# Patient Record
Sex: FEMALE | Race: WHITE | NOT HISPANIC OR LATINO | Employment: OTHER | URBAN - METROPOLITAN AREA
[De-identification: names, ages, dates, MRNs, and addresses within clinical notes are randomized per-mention and may not be internally consistent; named-entity substitution may affect disease eponyms.]

---

## 2017-01-03 ENCOUNTER — APPOINTMENT (OUTPATIENT)
Dept: NEUROLOGY | Facility: MEDICAL CENTER | Age: 52
End: 2017-01-03
Payer: MEDICAID

## 2017-01-09 ENCOUNTER — TELEPHONE (OUTPATIENT)
Dept: RHEUMATOLOGY | Facility: PHYSICIAN GROUP | Age: 52
End: 2017-01-09

## 2017-01-09 NOTE — TELEPHONE ENCOUNTER
----- Message from Yamilka Mcknight M.D. sent at 1/9/2017  1:21 PM PST -----  Last time you spoke with her she was emotionally distraught.  Can you see if she has time to review results again?

## 2017-01-17 ENCOUNTER — APPOINTMENT (OUTPATIENT)
Dept: RHEUMATOLOGY | Facility: PHYSICIAN GROUP | Age: 52
End: 2017-01-17
Payer: MEDICAID

## 2017-03-27 ENCOUNTER — APPOINTMENT (OUTPATIENT)
Dept: RADIOLOGY | Facility: IMAGING CENTER | Age: 52
End: 2017-03-27
Attending: INTERNAL MEDICINE
Payer: MEDICAID

## 2017-03-27 ENCOUNTER — NON-PROVIDER VISIT (OUTPATIENT)
Dept: URGENT CARE | Facility: PHYSICIAN GROUP | Age: 52
End: 2017-03-27
Payer: MEDICAID

## 2017-03-27 DIAGNOSIS — E11.9 DIABETES MELLITUS WITHOUT COMPLICATION (HCC): ICD-10-CM

## 2017-03-27 DIAGNOSIS — R94.5 NONSPECIFIC ABNORMAL RESULTS OF LIVER FUNCTION STUDY: ICD-10-CM

## 2017-03-27 DIAGNOSIS — M32.9 SYSTEMIC LUPUS ERYTHEMATOSUS (HCC): ICD-10-CM

## 2017-03-27 DIAGNOSIS — A04.72 INTESTINAL INFECTION DUE TO CLOSTRIDIUM DIFFICILE: ICD-10-CM

## 2017-03-27 DIAGNOSIS — R10.13 ABDOMINAL PAIN, EPIGASTRIC: ICD-10-CM

## 2017-03-27 DIAGNOSIS — K76.0 FATTY METAMORPHOSIS OF LIVER: ICD-10-CM

## 2017-03-27 DIAGNOSIS — R11.2 NAUSEA AND VOMITING, INTRACTABILITY OF VOMITING NOT SPECIFIED, UNSPECIFIED VOMITING TYPE: ICD-10-CM

## 2017-03-27 PROCEDURE — 74000 DX-ABDOMEN-1 VIEW: CPT | Performed by: FAMILY MEDICINE

## 2017-03-30 ENCOUNTER — HOSPITAL ENCOUNTER (OUTPATIENT)
Dept: LAB | Facility: MEDICAL CENTER | Age: 52
End: 2017-03-30
Attending: INTERNAL MEDICINE
Payer: MEDICAID

## 2017-03-30 LAB
ALBUMIN SERPL BCP-MCNC: 3.7 G/DL (ref 3.2–4.9)
ALBUMIN/GLOB SERPL: 1.1 G/DL
ALP SERPL-CCNC: 156 U/L (ref 30–99)
ALT SERPL-CCNC: 43 U/L (ref 2–50)
ANION GAP SERPL CALC-SCNC: 11 MMOL/L (ref 0–11.9)
AST SERPL-CCNC: 69 U/L (ref 12–45)
BILIRUB SERPL-MCNC: 1.6 MG/DL (ref 0.1–1.5)
BUN SERPL-MCNC: 10 MG/DL (ref 8–22)
CALCIUM SERPL-MCNC: 8.9 MG/DL (ref 8.5–10.5)
CHLORIDE SERPL-SCNC: 106 MMOL/L (ref 96–112)
CO2 SERPL-SCNC: 23 MMOL/L (ref 20–33)
CREAT SERPL-MCNC: 0.67 MG/DL (ref 0.5–1.4)
GLOBULIN SER CALC-MCNC: 3.4 G/DL (ref 1.9–3.5)
GLUCOSE SERPL-MCNC: 86 MG/DL (ref 65–99)
INR PPP: 0.95 (ref 0.87–1.13)
POTASSIUM SERPL-SCNC: 3.7 MMOL/L (ref 3.6–5.5)
PROT SERPL-MCNC: 7.1 G/DL (ref 6–8.2)
PROTHROMBIN TIME: 13 SEC (ref 12–14.6)
SODIUM SERPL-SCNC: 140 MMOL/L (ref 135–145)

## 2017-03-30 PROCEDURE — 85610 PROTHROMBIN TIME: CPT

## 2017-03-30 PROCEDURE — 36415 COLL VENOUS BLD VENIPUNCTURE: CPT

## 2017-03-30 PROCEDURE — 80053 COMPREHEN METABOLIC PANEL: CPT

## 2017-03-31 ENCOUNTER — OFFICE VISIT (OUTPATIENT)
Dept: RHEUMATOLOGY | Facility: PHYSICIAN GROUP | Age: 52
End: 2017-03-31
Payer: MEDICAID

## 2017-03-31 VITALS
RESPIRATION RATE: 16 BRPM | SYSTOLIC BLOOD PRESSURE: 148 MMHG | WEIGHT: 181 LBS | DIASTOLIC BLOOD PRESSURE: 100 MMHG | BODY MASS INDEX: 32.07 KG/M2 | TEMPERATURE: 98.3 F | HEART RATE: 80 BPM | OXYGEN SATURATION: 96 %

## 2017-03-31 DIAGNOSIS — Z79.899 ENCOUNTER FOR LONG-TERM (CURRENT) USE OF HIGH-RISK MEDICATION: ICD-10-CM

## 2017-03-31 DIAGNOSIS — R79.89 ABNORMAL LFTS: ICD-10-CM

## 2017-03-31 DIAGNOSIS — M32.9 SLE (SYSTEMIC LUPUS ERYTHEMATOSUS) (HCC): ICD-10-CM

## 2017-03-31 DIAGNOSIS — M10.9 GOUT, UNSPECIFIED CAUSE, UNSPECIFIED CHRONICITY, UNSPECIFIED SITE: ICD-10-CM

## 2017-03-31 DIAGNOSIS — M1A.9XX1 CHRONIC TOPHACEOUS GOUT: ICD-10-CM

## 2017-03-31 DIAGNOSIS — M19.90 CHRONIC INFLAMMATORY ARTHRITIS: ICD-10-CM

## 2017-03-31 PROCEDURE — 99214 OFFICE O/P EST MOD 30 MIN: CPT | Performed by: INTERNAL MEDICINE

## 2017-03-31 ASSESSMENT — ENCOUNTER SYMPTOMS
BLOOD IN STOOL: 0
FEVER: 0
EYE PAIN: 0
HEMOPTYSIS: 0
NECK PAIN: 1
DOUBLE VISION: 0
CONSTIPATION: 0
CHILLS: 0
PHOTOPHOBIA: 0
BACK PAIN: 1
COUGH: 0

## 2017-03-31 NOTE — PROGRESS NOTES
Subjective:   Date of Consultation:3/31/2017  1:15 PM  Primary care physician:Jasmyne Soto M.D.    Reason for Consultation:  Ms. Leggett  is a pleasant 50 y.o. year old female who presents for follow-up of Chronic inflammatory arthritis, chronic tophaceous gout    Recent Hospitalization  At out last visit she reports that she was hospitalized in October 2016 with elevated LFT and a diagnosis of lupus.  At that time she was treated with solumedrol IV.    Since last visit she again was hospitalized for C diff and pneumonia.    During her hospitalization Humira was held.  She restarted this after her Humira February 15th.  She was discharged with prednisone 10 mg po q day.    History of recurrent pancreatitis  This most recent episode (Oct 2016) would be her 6th episode.  Episodes of pancreatitis started in 2007 when she was drinking.    Chronic inflammatory arthritis  At last visit there was some disease activity.  We will continue to keep her at  Prednisone 15 mg po q day  She restarted her Humira on February 15th after discharge and notes mild stiffness 1-1.5 hours.  She denies any swelling.    Current Medications:  Humira 40 mg po q 2 weeks  (q Wednesday) last shot was the past Wednesday  Sulfasalazine was stopped due to elevated LFT  Plaquenil 200 mg BID  She had tapered down to 10 mg po q day of prednisone  She is off methotrexate (secondary to elevated LFT) stopped in December    RHEUM HISTORY  She first presented to see Dr. Patino 9/15/2015 with right wrist and hand pain with swelling and erythema.   However she was initially diagnosed in 2004 with bursitis and 2009 with a diagnosis of lupus.  She presented to Dr. Lawler's practice and was diagnosed with seronegative Rheumatoid Arthritis.  Historically it seems she was responsive only to high dose steroids.  She was previously on Enbrel with poor response she started getting sick.  She was then placed on CImzia and had good response however insurance would not  cover the medication, thus she was started on Humira.  Her care has been complicated with sepsis and elevated LFT for which MTX was initially stopped then sulfasalazine.    She does report a history of lupus and received a diagnosis in 2009.  According to notes from 10/6/2015 her manifestations include intermittent oral ulcers, pleuritic chest pain, photosensitivity.      Hand xray 9/15/2015 shows no erosions but soft tissue swelling related to OA vs inflammatory arthritis    Elevated LFT  She was noted to have high cholesterol and elevated LFTs at her hospitalization.  She was followed with Dr. Boone and felt she had fatty liver.    Chronic Tophaceous gout   No gout flares since last visit.  Last uric acid was 4.9 on  7/22/2016  Off colchicine - this was stopped during her hospitalization   She has not had any problems or flares.    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 Soto    Osteopenia  On alendronate  No fractures since last visit    Hematuria  She reports she notes blood tinge hematuria.  She has seen urology at [facility] and her evaluation felt it was 2/2 methotrexate    DDD, cervical  Saw neurosurgery.  She was scheduled for an injection but missed the appointment.  Planned for surgery in January 2016  However due to her hospitalization had to miss her surgery again.  She feels her neck pain is worsening and plans to get in touch with surgery again.    Vitamin D deficiency  Patient state she is taking calcium and vitamin D  25 hydroxy vitamin D in 7/2016 was 29    Chronic steroid use  She was told she had adrenal insufficiency at her October hospitalization  She reports no change in her symptoms as she decreased from 15 mg po q day to 10 mg po qday      Past Medical History   Diagnosis Date   • Lupus (CMS-HCC)    • Fibroids    • SLE (systemic lupus  erythematosus) (CMS-HCC)    • Psoriatic arthritis (CMS-HCC)    • Pancreatitis    • Arthritis      Past Surgical History   Procedure Laterality Date   • Gyn surgery  8/2013     Hysteroscopy, D and C     Allergies   Allergen Reactions   • Codeine      Outpatient Encounter Prescriptions as of 3/31/2017   Medication Sig Dispense Refill   • allopurinol (ZYLOPRIM) 300 MG Tab Take 1 Tab by mouth every day. 30 Tab 0   • hydroxychloroquine (PLAQUENIL) 200 MG Tab Take 1 Tab by mouth 2 times a day. 60 Tab 1   • folic acid (FOLVITE) 1 MG Tab Take 2 tabs daily. 60 Tab 11   • predniSONE (DELTASONE) 5 MG Tab Take 15 mg daily as instructed (Patient taking differently: 3 times a day. Take 15 mg daily as instructed) 270 Tab 2   • lisinopril (PRINIVIL) 10 MG Tab Take 1 Tab by mouth every day. 30 Tab 11   • Adalimumab 40 MG/0.8ML Pen-injector Kit Inject 40 mg as instructed every 14 days. 2 Each 12   • allopurinol (ZYLOPRIM) 100 MG Tab Take 50 mg daily for 1 week then 100 mg daily for 1 week then 150 mg daily for 1 week and then 200 mg daily. 60 Tab 1   • cyanocobalamin (VITAMIN B-12) 500 MCG Tab      • famotidine (PEPCID) 20 MG Tab      • gemfibrozil (LOPID) 600 MG Tab      • predniSONE (DELTASONE) 5 MG Tab      • esomeprazole (NEXIUM) 40 MG delayed-release capsule Take 40 mg by mouth every morning before breakfast.     • ferrous sulfate 325 (65 FE) MG EC tablet Take 1 Tab by mouth with dinner. 90 Each 5   • predniSONE (DELTASONE) 20 MG TABS Take 1 Tab by mouth every day. Take 20mg for 7 days  Then 10mg for 7 days  Then 5mg for 7 days 12 Tab 0   • oxybutynin (DITROPAN) 5 MG TABS Take 5 mg by mouth 2 Times a Day.     • Insulin Lispro, Human, (HUMALOG PEN SC) Inject  as instructed.     • fenofibrate (TRICOR) 145 MG Tab      • oxycodone immediate release (ROXICODONE) 10 MG immediate release tablet      • MOVIPREP 100 G Recon Soln      • metformin (GLUCOPHAGE) 500 MG Tab      • FREESTYLE LANCETS Misc      • Glucose Blood (FREESTYLE LITE  "TEST VI)      • RELION ALCOHOL SWABS 70 % Pads      • Insulin Syringe-Needle U-100 30G X 5/16\" 0.5 ML Misc      • alendronate (FOSAMAX) 70 MG Tab Take 1 Tab by mouth every 7 days. 4 Tab 3   • ergocalciferol (DRISDOL) 67550 UNIT capsule Take 1 cap once weekly for 12 weeks. 12 Cap 0   • colchicine (COLCRYS) 0.6 MG Tab Take 1 Tab by mouth 2 times a day. 60 Tab 5   • naproxen (NAPROSYN) 500 MG Tab Take 500 mg by mouth 2 times a day, with meals.     • nystatin (MYCOSTATIN) Powder Apply  to affected area(s) 3 times a day.     • Phenol Liquid by Does not apply route.     • THIAMINE HCL PO Take  by mouth.     • NS SOLN 250 mL with vancomycin 1000 MG SOLR 2 g 2 g by Intravenous route.     • Oxycodone-Acetaminophen (PERCOCET-10)  MG Tab 1 tab tid prn 30 Tab 0   • amoxicillin-clavulanate (AUGMENTIN) 500-125 MG Tab      • chlorhexidine (PERIDEX) 0.12 % Solution      • hydrocodone-acetaminophen 2.5-108 mg/5mL (HYCET) 7.5-325 MG/15ML solution      • lisinopril-hydrochlorothiazide (PRINZIDE, ZESTORETIC) 20-25 MG per tablet      • metronidazole (FLAGYL) 500 MG Tab      • nystatin (MYCOSTATIN) 994559 UNIT/ML Suspension      • thiamine (THIAMINE) 100 MG Tab      • vancomycin 50 mg/ml (FIRST-VANCOMYCIN) 50 MG/ML Solution oral solution   0   • Ascorbic Acid (VITAMIN C-RICKY HIPS) 500 MG Tab      • ciprofloxacin (CIPRO) 500 MG TABS Take 500 mg by mouth 2 times a day.     • hydrocodone-acetaminophen (NORCO) 5-325 MG TABS per tablet Take 1 Tab by mouth every four hours as needed. 20 Tab 0   • promethazine (PHENERGAN) 25 MG TABS Take 25 mg by mouth every 6 hours as needed.     • Potassium Gluconate 595 MG CAPS Take  by mouth.       No facility-administered encounter medications on file as of 3/31/2017.     Social History     Social History   • Marital Status:      Spouse Name: N/A   • Number of Children: N/A   • Years of Education: N/A     Occupational History   • Not on file.     Social History Main Topics   • Smoking status: " Never Smoker    • Smokeless tobacco: Not on file   • Alcohol Use: Yes   • Drug Use: No   • Sexual Activity: Not on file     Other Topics Concern   • Not on file     Social History Narrative      History   Smoking status   • Never Smoker    Smokeless tobacco   • Not on file     History   Alcohol Use   • Yes     History   Drug Use No      OB History    Para Term  AB SAB TAB Ectopic Multiple Living   0 0                    No LMP recorded.    G P A L     Family History   Problem Relation Age of Onset   • Diabetes Mother    • Hypertension Mother    • Cancer Mother    • Diabetes Father    • Cancer Father        Review of Systems   Constitutional: Positive for malaise/fatigue. Negative for fever and chills.   HENT:        No oral ulcers or dry mouth   Eyes: Negative for double vision, photophobia and pain.   Respiratory: Negative for cough and hemoptysis.    Cardiovascular: Negative for chest pain.   Gastrointestinal: Negative for constipation and blood in stool.   Musculoskeletal: Positive for back pain, joint pain and neck pain.   Skin:        No malar rash or photosensitive rash        Objective:   /100 mmHg  Pulse 80  Temp(Src) 36.8 °C (98.3 °F)  Resp 16  Wt 82.101 kg (181 lb)  SpO2 96%  Breastfeeding? No    Physical Exam   Constitutional: She is oriented to person, place, and time. She appears well-developed and well-nourished. No distress.   HENT:   Head: Normocephalic.   Right Ear: External ear normal.   Left Ear: External ear normal.   Mouth/Throat: Oropharynx is clear and moist.   Eyes: Conjunctivae and EOM are normal. Pupils are equal, round, and reactive to light. Right eye exhibits no discharge. Left eye exhibits no discharge.   Neck: No JVD present.   Pulmonary/Chest: Effort normal and breath sounds normal. No stridor. No respiratory distress.   Abdominal: Soft.   Musculoskeletal:   Gout tophi on the right elbow and MCP swelling if the 2nd and 3rd MCP.     Neurological: She is alert  and oriented to person, place, and time.   Skin: Skin is warm. She is not diaphoretic. No erythema.   Psychiatric: She has a normal mood and affect. Her behavior is normal. Thought content normal.       Assessment:     1. Chronic inflammatory arthritis  IGG SERUM QUANT    COMP METABOLIC PANEL    CBC WITH DIFFERENTIAL    WESTERGREN SED RATE    CRP QUANTITIVE (NON-CARDIAC)    REFERRAL TO INTERNAL MEDICINE    CANCELED: REFERRAL TO INTERNAL MEDICINE    CANCELED: REFERRAL TO INTERNAL MEDICINE   2. Chronic tophaceous gout  IGG SERUM QUANT    COMP METABOLIC PANEL    CBC WITH DIFFERENTIAL    WESTERGREN SED RATE    CRP QUANTITIVE (NON-CARDIAC)    CANCELED: REFERRAL TO INTERNAL MEDICINE    CANCELED: REFERRAL TO INTERNAL MEDICINE   3. Abnormal LFTs  IGG SERUM QUANT    COMP METABOLIC PANEL    CBC WITH DIFFERENTIAL    WESTERGREN SED RATE    CRP QUANTITIVE (NON-CARDIAC)    REFERRAL TO INTERNAL MEDICINE    CANCELED: REFERRAL TO INTERNAL MEDICINE    CANCELED: REFERRAL TO INTERNAL MEDICINE   4. Encounter for long-term (current) use of high-risk medication  IGG SERUM QUANT    COMP METABOLIC PANEL    CBC WITH DIFFERENTIAL    WESTERGREN SED RATE    CRP QUANTITIVE (NON-CARDIAC)    CANCELED: REFERRAL TO INTERNAL MEDICINE    CANCELED: REFERRAL TO INTERNAL MEDICINE   5. Gout, unspecified cause, unspecified chronicity, unspecified site  URIC ACID, SERUM   6. SLE (systemic lupus erythematosus) (CMS-[facility])  COMPLEMENT C4    DSDNA IGG TITER    COMPLEMENT C3     Labs:    Lab Results   Component Value Date/Time    QUANTIFERON TB GOLD Negative 12/30/2015 12:27 PM     Lab Results   Component Value Date/Time    HEPATITIS B CCORE AB, TOTAL Negative 10/21/2015 08:47 AM    HEPATITIS B SURFACE ANTIGEN Negative 10/21/2015 08:47 AM     Lab Results   Component Value Date/Time    HEPATITIS C ANTIBODY Negative 10/21/2015 08:47 AM     Lab Results   Component Value Date/Time    SODIUM 140 03/30/2017 07:59 AM    POTASSIUM 3.7 03/30/2017 07:59 AM    CHLORIDE 106  03/30/2017 07:59 AM    CO2 23 03/30/2017 07:59 AM    GLUCOSE 86 03/30/2017 07:59 AM    BUN 10 03/30/2017 07:59 AM    CREATININE 0.67 03/30/2017 07:59 AM      Lab Results   Component Value Date/Time    WBC 11.3* 11/30/2016 11:25 AM    RBC 3.85* 11/30/2016 11:25 AM    HEMOGLOBIN 12.0 11/30/2016 11:25 AM    HEMATOCRIT 36.8* 11/30/2016 11:25 AM    MCV 95.6 11/30/2016 11:25 AM    MCH 31.2 11/30/2016 11:25 AM    MCHC 32.6* 11/30/2016 11:25 AM    MPV 11.2 11/30/2016 11:25 AM    NEUTROPHILS-POLYS 76.50* 11/30/2016 11:25 AM    LYMPHOCYTES 14.30* 11/30/2016 11:25 AM    MONOCYTES 7.10 11/30/2016 11:25 AM    EOSINOPHILS 0.40 11/30/2016 11:25 AM    BASOPHILS 0.50 11/30/2016 11:25 AM    HYPOCHROMIA 1+ 01/22/2014 02:20 AM      Lab Results   Component Value Date/Time    CALCIUM 8.9 03/30/2017 07:59 AM    AST(SGOT) 69* 03/30/2017 07:59 AM    ALT(SGPT) 43 03/30/2017 07:59 AM    ALKALINE PHOSPHATASE 156* 03/30/2017 07:59 AM    TOTAL BILIRUBIN 1.6* 03/30/2017 07:59 AM    ALBUMIN 3.7 03/30/2017 07:59 AM    TOTAL PROTEIN 7.1 03/30/2017 07:59 AM     Lab Results   Component Value Date/Time    URIC ACID 4.5 11/30/2016 11:25 AM    RHEUMATOID FACTOR -NEPH- <10 07/22/2016 04:41 PM    CCP ANTIBODIES 2 06/22/2015 11:24 AM    ANTINUCLEAR ANTIBODY None Detected 07/22/2016 04:41 PM     Lab Results   Component Value Date/Time    SED RATE WESTERGREN 16 11/30/2016 11:25 AM    STAT C-REACTIVE PROTEIN 0.20 11/30/2016 11:25 AM     Lab Results   Component Value Date/Time    DILUTE BLANCO VIPER VENOM ALIREZA 46.8 02/10/2016 04:27 PM    LUPUS ANTICOAGULANT Not Present 02/10/2016 04:27 PM     Lab Results   Component Value Date/Time    C3 COMPLEMENT 214.0* 11/30/2016 11:25 AM    COMPLEMENT C4 39.0 11/30/2016 11:25 AM    ANTI-CARIOLIPIN AB IGG 1 02/10/2016 04:27 PM    ANTI-CARDIOLIPIN AB IGM 0 02/10/2016 04:27 PM    ANTI-CARDIOLIPIN AB IGA 1 02/10/2016 04:27 PM     Lab Results   Component Value Date/Time    ANTI-DNA -DS None Detected 11/30/2016 11:25 AM     Lab  Results   Component Value Date/Time    ANTI-DNA -DS None Detected 11/30/2016 11:25 AM    ANCA IGG <1:20 02/10/2016 04:27 PM    C3 COMPLEMENT 214.0* 11/30/2016 11:25 AM     Lab Results   Component Value Date/Time    COLOR Straw 11/30/2016 11:26 AM    SPECIFIC GRAVITY 1.005 11/30/2016 11:26 AM    PH 6.0 11/30/2016 11:26 AM    GLUCOSE Negative 11/30/2016 11:26 AM    KETONES Negative 11/30/2016 11:26 AM    PROTEIN Negative 11/30/2016 11:26 AM     No results found for: TOTPROTUR, TOTALVOLUME, [id number]  No results found for: SSA60, SSA52  Lab Results   Component Value Date/Time    GLYCOHEMOGLOBIN 6.5* 09/21/2015 09:34 AM     Lab Results   Component Value Date/Time    CPK TOTAL 130 07/22/2016 04:41 PM     Lab Results   Component Value Date/Time    G-6-PD 11.0 12/14/2015 12:40 PM     No results found for: [id number]  No results found for: ACESERUM  Lab Results   Component Value Date/Time    25-HYDROXY   VITAMIN D 25 29* 07/22/2016 04:41 PM     No results found for: TSH, FREEDIR  Lab Results   Component Value Date/Time    TSH 1.310 07/22/2016 04:41 PM    FREE T-4 0.99 09/21/2015 09:34 AM     No results found for: MICROSOMALA, ANTITHYROGL  No results found for: IGGLYMEABS  No results found for: ANTIMITOCHO, FACTIN  No results found for: IGA, TTRANSIGA, ENDOIGA  Lab Results   Component Value Date/Time    FLUID TYPE comment 09/15/2015 02:59 PM    CRYSTALS, BODY FLUID Many 09/15/2015 02:59 PM     No results found for: ISTATICAL  No results found for: ISTATCREAT  No results found for: CTELOPEP  No results found for: GBMABG  No results found for: PTHINTACT        Medical Decision Making:  Today's Assessment / Status / Plan:     Chronic inflammatory arthritis  She is currently on prednisone 10 mg and Humira.  She restarted Humira about 6 weeks ago.  She does have prolonged morning stiffness. We will continue her regimen however I would like to restart sulfasalazine.      There is concenr for etiology of LFT.  She reports she will  may have a liver biopsy.  We will await that assessment.  Her recent LFT is slightly elevated alkaline phosphatase and AST>    I would like to restart her sulfasalazine.      History of Lupus  No active signs or symptoms.  Will recheck complement and anti dsDNA and evaluate for cytopenias.  Continue plaquenil 200 mg BID      Recurrent pancreatitis  Etiology unclear.  She does not demonstrate or report symptoms of rash, serositis related to lupus.  We will request records from [facility] for labs, progress notes.  I am not sure if she had a  Lupus flare during her most recent hospitalization.  She has hypertriglyceridemia which could be an explanation for pancreatitis.        Elevated LFT and hepatomegaly on exam  Follows GI      History of Hematuria  Etiology was attributed to methotrexate use.  Recheck still shows it is elevated.    Osteopenia and vitamin D deficiency  Continue fosamax   Continue vitamin D and calcium supplement    Chronic tophaceous gout  Continue allopurinol   Will recheck uric acid    1. Chronic inflammatory arthritis  IGG SERUM QUANT    COMP METABOLIC PANEL    CBC WITH DIFFERENTIAL    WESTERGREN SED RATE    CRP QUANTITIVE (NON-CARDIAC)    REFERRAL TO INTERNAL MEDICINE    CANCELED: REFERRAL TO INTERNAL MEDICINE    CANCELED: REFERRAL TO INTERNAL MEDICINE   2. Chronic tophaceous gout  IGG SERUM QUANT    COMP METABOLIC PANEL    CBC WITH DIFFERENTIAL    WESTERGREN SED RATE    CRP QUANTITIVE (NON-CARDIAC)    CANCELED: REFERRAL TO INTERNAL MEDICINE    CANCELED: REFERRAL TO INTERNAL MEDICINE   3. Abnormal LFTs  IGG SERUM QUANT    COMP METABOLIC PANEL    CBC WITH DIFFERENTIAL    WESTERGREN SED RATE    CRP QUANTITIVE (NON-CARDIAC)    REFERRAL TO INTERNAL MEDICINE    CANCELED: REFERRAL TO INTERNAL MEDICINE    CANCELED: REFERRAL TO INTERNAL MEDICINE   4. Encounter for long-term (current) use of high-risk medication  IGG SERUM QUANT    COMP METABOLIC PANEL    CBC WITH DIFFERENTIAL    WESTERGREN SED  RATE    CRP QUANTITIVE (NON-CARDIAC)    CANCELED: REFERRAL TO INTERNAL MEDICINE    CANCELED: REFERRAL TO INTERNAL MEDICINE   5. Gout, unspecified cause, unspecified chronicity, unspecified site  URIC ACID, SERUM   6. SLE (systemic lupus erythematosus) (CMS-[facility])  COMPLEMENT C4    DSDNA IGG TITER    COMPLEMENT C3           Return in about 3 months (around 6/30/2017).    She agreed and verbalized her agreement and understanding with the current plan. I answered all questions and concerns she has at this time and advised her to call at any time in there interim with questions or concerns in regards to her care.    Thank you for allowing me to participate in her care, I will continue to follow closely.

## 2017-03-31 NOTE — MR AVS SNAPSHOT
Brandijaime Leggett   3/31/2017 1:30 PM   Office Visit   MRN: [id number]    Department:  Rheumatology [facility]   Dept Phone:  [phone]    Description:  Female : 1965   Provider:  Yamilka Mcknight M.D.           Reason for Visit     Follow-Up follow up      Allergies as of 3/31/2017     Allergen Noted Reactions    Codeine 2014         You were diagnosed with     Chronic inflammatory arthritis   [821106]       Chronic tophaceous gout   [482975]       Abnormal LFTs   [701497]       Encounter for long-term (current) use of high-risk medication   [951778]       Gout, unspecified cause, unspecified chronicity, unspecified site   [1429325]         Vital Signs     Blood Pressure Pulse Temperature Respirations Weight Oxygen Saturation    148/100 mmHg 80 36.8 °C (98.3 °F) 16 82.101 kg (181 lb) 96%    Breastfeeding? Smoking Status                No Never Smoker           Basic Information     Date Of Birth Sex Race Ethnicity Preferred Language    1965 Female White Non- English      Your appointments     2017  1:30 PM   Follow Up Visit with Yamilka Mcknight M.D.   [facility] (--)    [address] [place] [address], [facility]  [facility] [id number] [phone]           You will be receiving a confirmation call a few days before your appointment from our automated call confirmation system.              Problem List              ICD-10-CM Priority Class Noted - Resolved    Vertigo R42   2014 - Present    Left arm swelling M79.89   2014 - Present    SLE (systemic lupus erythematosus) (CMS-HCC) M32.9   Unknown - Present    Psoriatic arthritis (CMS-HCC) L40.50   Unknown - Present    Pancreatitis K85.90   Unknown - Present    Right hand pain M79.641   9/15/2015 - Present    Hyperuricemia E79.0   9/15/2015 - Present    Polyarthralgia M25.50   9/15/2015 - Present    Chronic inflammatory arthritis M19.90   9/15/2015 - Present    Chronic tophaceous gout M1A.9XX1   2015 - Present    Drug-induced chronic pancreatitis (CMS-HCC) K86.1   10/6/2015 - Present    Vitamin D deficiency E55.9   10/6/2015 - Present    Osteopenia M85.80   10/26/2015 - Present    Dysuria R30.0   12/14/2015 - Present    Abnormal LFTs R79.89   1/14/2016 - Present    Abdominal pain in female patient R10.9   1/14/2016 - Present    Xanthoma E75.5   2/10/2016 - Present    Hypertriglyceridemia E78.1   2/10/2016 - Present    Traumatic periorbital ecchymosis S00.10XA   4/4/2016 - Present    Chronic right shoulder pain M25.511, G89.29   6/2/2016 - Present    Essential hypertension I10   6/2/2016 - Present    Skin lesions L98.9   6/2/2016 - Present    Chronic fatigue R53.82   7/14/2016 - Present    Hyperlipidemia E78.5   7/14/2016 - Present    DDD (degenerative disc disease), cervical M50.30   7/14/2016 - Present      Health Maintenance        Date Due Completion Dates    IMM HEP B VACCINE (1 of 3 - Primary Series) 1965 ---    DIABETES MONOFILAMENT / LE EXAM 6/14/1968 ---    RETINAL SCREENING 12/14/1985 ---    URINE ACR / MICROALBUMIN 12/14/1985 ---    IMM DTaP/Tdap/Td Vaccine (1 - Tdap) 12/14/1986 ---    PAP SMEAR 12/14/1988 ---    MAMMOGRAM 8/5/2009 8/5/2008, 8/5/2008    A1C SCREENING 3/21/2016 9/21/2015    IMM INFLUENZA (1) 9/1/2016 1/19/2014    FASTING LIPID PROFILE 9/21/2016 9/21/2015, 6/22/2015    SERUM CREATININE 3/30/2018 3/30/2017, 11/30/2016, 7/22/2016, 2/10/2016, 12/30/2015, 12/14/2015, 10/21/2015, 9/21/2015, 9/16/2015, 6/22/2015, 2/24/2015, 11/5/2014, 11/4/2014, 6/2/2014, 1/22/2014, 1/21/2014, 1/20/2014, 1/19/2014            Current Immunizations     Influenza TIV (IM) 1/19/2014 11:47 AM    Pneumococcal polysaccharide vaccine (PPSV-23) 12/15/2015 11:45 AM, 1/9/2014      Below and/or attached are the medications your provider expects you to take. Review all of your home medications and newly ordered medications with your provider and/or pharmacist. Follow medication instructions as directed by your provider and/or  pharmacist. Please keep your medication list with you and share with your provider. Update the information when medications are discontinued, doses are changed, or new medications (including over-the-counter products) are added; and carry medication information at all times in the event of emergency situations     Allergies:  CODEINE - (reactions not documented)               Medications  Valid as of: March 31, 2017 -  1:51 PM    Generic Name Brand Name Tablet Size Instructions for use    Adalimumab (Pen-injector Kit) Adalimumab 40 MG/0.8ML Inject 40 mg as instructed every 14 days.        Alcohol Swabs (Pads) RELION ALCOHOL SWABS 70 %         Alendronate Sodium (Tab) FOSAMAX 70 MG Take 1 Tab by mouth every 7 days.        Allopurinol (Tab) ZYLOPRIM 100 MG Take 50 mg daily for 1 week then 100 mg daily for 1 week then 150 mg daily for 1 week and then 200 mg daily.        Allopurinol (Tab) ZYLOPRIM 300 MG Take 1 Tab by mouth every day.        Amoxicillin-Pot Clavulanate (Tab) AUGMENTIN 500-125 MG         Ascorbic Acid (Tab) Vitamin C-Prerna Hips 500 MG         Chlorhexidine Gluconate (Solution) PERIDEX 0.12 %         Ciprofloxacin HCl (Tab) CIPRO 500 MG Take 500 mg by mouth 2 times a day.        Colchicine (Tab) COLCRYS 0.6 MG Take 1 Tab by mouth 2 times a day.        Cyanocobalamin (Tab) VITAMIN B-12 500 MCG         Ergocalciferol (Cap) DRISDOL 60040 UNIT Take 1 cap once weekly for 12 weeks.        Esomeprazole Magnesium (CAPSULE DELAYED RELEASE) NEXIUM 40 MG Take 40 mg by mouth every morning before breakfast.        Famotidine (Tab) PEPCID 20 MG         Fenofibrate (Tab) TRICOR 145 MG         Ferrous Sulfate (Tablet Delayed Response) ferrous sulfate 325 (65 FE) MG Take 1 Tab by mouth with dinner.        Folic Acid (Tab) FOLVITE 1 MG Take 2 tabs daily.        Gemfibrozil (Tab) LOPID 600 MG         Glucose Blood           Hydrocodone-Acetaminophen (Tab) NORCO 5-325 MG Take 1 Tab by mouth every four hours as needed.         "Hydrocodone-Acetaminophen (Solution) HYCET 7.5-325 MG/15ML         Hydroxychloroquine Sulfate (Tab) PLAQUENIL 200 MG Take 1 Tab by mouth 2 times a day.        Insulin Lispro   Inject  as instructed.        Insulin Syringe-Needle U-100 (Misc) Insulin Syringe-Needle U-100 30G X 5/16\" 0.5 ML         Lancets (Misc) FREESTYLE LANCETS          Lisinopril (Tab) PRINIVIL 10 MG Take 1 Tab by mouth every day.        Lisinopril-Hydrochlorothiazide (Tab) PRINZIDE, ZESTORETIC 20-25 MG         MetFORMIN HCl (Tab) GLUCOPHAGE 500 MG         MetroNIDAZOLE (Tab) FLAGYL 500 MG         Naproxen (Tab) NAPROSYN 500 MG Take 500 mg by mouth 2 times a day, with meals.        NS SOLN 250 mL with vancomycin 1000 MG SOLR 2 g   2 g by Intravenous route.        Nystatin (Powder) MYCOSTATIN  Apply  to affected area(s) 3 times a day.        Nystatin (Suspension) MYCOSTATIN 294516 UNIT/ML         Oxybutynin Chloride (Tab) DITROPAN 5 MG Take 5 mg by mouth 2 Times a Day.        OxyCODONE HCl (Tab) ROXICODONE 10 MG         Oxycodone-Acetaminophen (Tab) PERCOCET-10  MG 1 tab tid prn        PEG-KCl-NaCl-NaSulf-Na Asc-C (Recon Soln) MOVIPREP 100 G         Phenol (Liquid) Phenol  by Does not apply route.        Potassium Gluconate (Cap) Potassium Gluconate 595 MG Take  by mouth.        PredniSONE (Tab) DELTASONE 20 MG Take 1 Tab by mouth every day. Take 20mg for 7 days  Then 10mg for 7 days  Then 5mg for 7 days        PredniSONE (Tab) DELTASONE 5 MG         PredniSONE (Tab) DELTASONE 5 MG Take 15 mg daily as instructed        Promethazine HCl (Tab) PHENERGAN 25 MG Take 25 mg by mouth every 6 hours as needed.        Thiamine HCl   Take  by mouth.        Thiamine HCl (Tab) THIAMINE 100 MG         Vancomycin HCl (Solution) FIRST-VANCOMYCIN 50 MG/ML         .                 Medicines prescribed today were sent to:     [facility] - [place], [place] - [address] [facility] [address]    Phone: [phone] " Fax: [phone]    Open 24 Hours?: No      Medication refill instructions:       If your prescription bottle indicates you have medication refills left, it is not necessary to call your provider’s office. Please contact your pharmacy and they will refill your medication.    If your prescription bottle indicates you do not have any refills left, you may request refills at any time through one of the following ways: The online [employer] system (except Urgent Care), by calling your provider’s office, or by asking your pharmacy to contact your provider’s office with a refill request. Medication refills are processed only during regular business hours and may not be available until the next business day. Your provider may request additional information or to have a follow-up visit with you prior to refilling your medication.   *Please Note: Medication refills are assigned a new Rx number when refilled electronically. Your pharmacy may indicate that no refills were authorized even though a new prescription for the same medication is available at the pharmacy. Please request the medicine by name with the pharmacy before contacting your provider for a refill.        Your To Do List     Future Labs/Procedures Complete By Expires    IGG SERUM QUANT  As directed 3/31/2018    Standing Orders Interval Expires    CBC WITH DIFFERENTIAL  6 month until 3/31/2018 3/31/2018    COMP METABOLIC PANEL  6 month until 3/31/2018 3/31/2018    CRP QUANTITIVE (NON-CARDIAC)  6 month until 3/31/2018 3/31/2018    WESTERGREN SED RATE  6 month until 3/31/2018 3/31/2018      Referral     A referral request has been sent to our patient care coordination department. Please allow 3-5 business days for us to process this request and contact you either by phone or mail. If you do not hear from us by the 5th business day, please call us at [phone].           [employer] Service Access Code: [email or web address]  Expires: 4/26/2017  2:06 PM    Vacation Listing Service  NATALIIA secure,  online tool to manage your health information     [employer]s e Health Access® is a secure, online tool that connects you to your personalized health information from the privacy of your home -- day or night - making it very easy for you to manage your healthcare. Once the activation process is completed, you can even access your medical information using the e Health Access aniket, which is available for free in the Apple Aniket store or Google Play store.     e Health Access provides the following levels of access (as shown below):   My Chart Features   [facility] Doctor [facility]  Specialists [facility]  Urgent  Care Non-[facility]  Primary Care  Doctor   Email your healthcare team securely and privately 24/7 X X X    Manage appointments: schedule your next appointment; view details of past/upcoming appointments X      Request prescription refills. X      View recent personal medical records, including lab and immunizations X X X X   View health record, including health history, allergies, medications X X X X   Read reports about your outpatient visits, procedures, consult and ER notes X X X X   See your discharge summary, which is a recap of your hospital and/or ER visit that includes your diagnosis, lab results, and care plan. X X       How to register for e Health Access:  1. Go to  [email or web address].  2. Click on the Sign Up Now box, which takes you to the New Member Sign Up page. You will need to provide the following information:  a. Enter your e Health Access Access Code exactly as it appears at the top of this page. (You will not need to use this code after you’ve completed the sign-up process. If you do not sign up before the expiration date, you must request a new code.)   b. Enter your date of birth.   c. Enter your home email address.   d. Click Submit, and follow the next screen’s instructions.  3. Create a e Health Access ID. This will be your e Health Access login ID and cannot be changed, so think of one that is secure and easy to  remember.  4. Create a [employer] password. You can change your password at any time.  5. Enter your Password Reset Question and Answer. This can be used at a later time if you forget your password.   6. Enter your e-mail address. This allows you to receive e-mail notifications when new information is available in [employer].  7. Click Sign Up. You can now view your health information.    For assistance activating your [employer] account, call [phone]

## 2017-04-04 ENCOUNTER — TELEPHONE (OUTPATIENT)
Dept: RHEUMATOLOGY | Facility: PHYSICIAN GROUP | Age: 52
End: 2017-04-04

## 2017-04-04 NOTE — TELEPHONE ENCOUNTER
Daniela from [facility] called today asking for clarification on this patients referral to them. The patient is already an established patient there, but they will not see her for pain management if that is what you are referring her for. She gave me # [phone] to reach her back at. Thank you! Please advise.

## 2017-04-04 NOTE — TELEPHONE ENCOUNTER
Called roopa back and let her know that she was referred for a PCP, she stated that the patient is already and established patient, and has been referred to pain management. Patient has Dr. Sidhu as a PCP. And was seen last in January. She was not terminated from this facility. Roopa stated she will contact the patient and schedule her an apt. Thank you!

## 2017-04-04 NOTE — TELEPHONE ENCOUNTER
I placed a referral for primary care. She does not have a primary care and would like to establish care

## 2017-04-19 ENCOUNTER — TELEPHONE (OUTPATIENT)
Dept: RHEUMATOLOGY | Facility: PHYSICIAN GROUP | Age: 52
End: 2017-04-19

## 2017-04-19 NOTE — TELEPHONE ENCOUNTER
Called patient to discuss internal medicine referral.  She called the number we provided and she states Dr. Haskins is no longer working there.    She is having upcoming surgery and advised her to hold her Humira.  Patient states she has already been instructed by surgeon.    -------------    Please find out from our referral service where else we can refer for internal medicine.

## 2017-05-10 ENCOUNTER — HOSPITAL ENCOUNTER (OUTPATIENT)
Dept: LAB | Facility: MEDICAL CENTER | Age: 52
End: 2017-05-10
Attending: INTERNAL MEDICINE
Payer: MEDICAID

## 2017-05-10 LAB
ANISOCYTOSIS BLD QL SMEAR: ABNORMAL
BASOPHILS # BLD AUTO: 1.2 % (ref 0–1.8)
BASOPHILS # BLD: 0.1 K/UL (ref 0–0.12)
COMMENT 1642: NORMAL
DACRYOCYTES BLD QL SMEAR: NORMAL
EOSINOPHIL # BLD AUTO: 0.03 K/UL (ref 0–0.51)
EOSINOPHIL NFR BLD: 0.3 % (ref 0–6.9)
ERYTHROCYTE [DISTWIDTH] IN BLOOD BY AUTOMATED COUNT: 54.1 FL (ref 35.9–50)
GIANT PLATELETS BLD QL SMEAR: NORMAL
HCT VFR BLD AUTO: 32.2 % (ref 37–47)
HGB BLD-MCNC: 9.6 G/DL (ref 12–16)
IMM GRANULOCYTES # BLD AUTO: 0.07 K/UL (ref 0–0.11)
IMM GRANULOCYTES NFR BLD AUTO: 0.8 % (ref 0–0.9)
INR PPP: 0.93 (ref 0.87–1.13)
LYMPHOCYTES # BLD AUTO: 1.07 K/UL (ref 1–4.8)
LYMPHOCYTES NFR BLD: 12.5 % (ref 22–41)
MCH RBC QN AUTO: 29.6 PG (ref 27–33)
MCHC RBC AUTO-ENTMCNC: 29.8 G/DL (ref 33.6–35)
MCV RBC AUTO: 99.4 FL (ref 81.4–97.8)
MICROCYTES BLD QL SMEAR: ABNORMAL
MONOCYTES # BLD AUTO: 0.38 K/UL (ref 0–0.85)
MONOCYTES NFR BLD AUTO: 4.4 % (ref 0–13.4)
MORPHOLOGY BLD-IMP: NORMAL
NEUTROPHILS # BLD AUTO: 6.93 K/UL (ref 2–7.15)
NEUTROPHILS NFR BLD: 80.8 % (ref 44–72)
NRBC # BLD AUTO: 0 K/UL
NRBC BLD AUTO-RTO: 0 /100 WBC
PLATELET # BLD AUTO: 243 K/UL (ref 164–446)
PLATELET BLD QL SMEAR: NORMAL
PMV BLD AUTO: 10.6 FL (ref 9–12.9)
PROTHROMBIN TIME: 12.8 SEC (ref 12–14.6)
RBC # BLD AUTO: 3.24 M/UL (ref 4.2–5.4)
RBC BLD AUTO: PRESENT
WBC # BLD AUTO: 8.6 K/UL (ref 4.8–10.8)

## 2017-05-10 PROCEDURE — 85025 COMPLETE CBC W/AUTO DIFF WBC: CPT

## 2017-05-10 PROCEDURE — 36415 COLL VENOUS BLD VENIPUNCTURE: CPT

## 2017-05-10 PROCEDURE — 85610 PROTHROMBIN TIME: CPT

## 2017-05-25 DIAGNOSIS — M19.90 CHRONIC INFLAMMATORY ARTHRITIS: ICD-10-CM

## 2017-05-25 NOTE — TELEPHONE ENCOUNTER
Was the patient seen in the last year in this department? Yes  last office visit 3/21/17, last labs done 5/10/17    Does patient have an active prescription for medications requested? No     Received Request Via: Pharmacy

## 2017-05-26 ENCOUNTER — TELEPHONE (OUTPATIENT)
Dept: RHEUMATOLOGY | Facility: PHYSICIAN GROUP | Age: 52
End: 2017-05-26

## 2017-05-27 ENCOUNTER — HOSPITAL ENCOUNTER (OUTPATIENT)
Dept: RADIOLOGY | Facility: MEDICAL CENTER | Age: 52
End: 2017-05-27

## 2017-05-27 ENCOUNTER — APPOINTMENT (OUTPATIENT)
Dept: RADIOLOGY | Facility: MEDICAL CENTER | Age: 52
DRG: 871 | End: 2017-05-27
Attending: HOSPITALIST
Payer: MEDICAID

## 2017-05-27 ENCOUNTER — APPOINTMENT (OUTPATIENT)
Dept: RADIOLOGY | Facility: MEDICAL CENTER | Age: 52
DRG: 871 | End: 2017-05-27
Attending: EMERGENCY MEDICINE
Payer: MEDICAID

## 2017-05-27 ENCOUNTER — HOSPITAL ENCOUNTER (INPATIENT)
Facility: MEDICAL CENTER | Age: 52
LOS: 9 days | DRG: 871 | End: 2017-06-05
Attending: EMERGENCY MEDICINE | Admitting: INTERNAL MEDICINE
Payer: MEDICAID

## 2017-05-27 ENCOUNTER — RESOLUTE PROFESSIONAL BILLING HOSPITAL PROF FEE (OUTPATIENT)
Dept: OTHER | Facility: MEDICAL CENTER | Age: 52
End: 2017-05-27
Payer: MEDICAID

## 2017-05-27 DIAGNOSIS — R10.11 RIGHT UPPER QUADRANT ABDOMINAL PAIN: ICD-10-CM

## 2017-05-27 DIAGNOSIS — E87.1 HYPONATREMIA: ICD-10-CM

## 2017-05-27 DIAGNOSIS — R19.7 DIARRHEA, UNSPECIFIED TYPE: ICD-10-CM

## 2017-05-27 DIAGNOSIS — I95.9 HYPOTENSION, UNSPECIFIED HYPOTENSION TYPE: ICD-10-CM

## 2017-05-27 DIAGNOSIS — E87.5 HYPERKALEMIA: ICD-10-CM

## 2017-05-27 DIAGNOSIS — R11.2 NON-INTRACTABLE VOMITING WITH NAUSEA, UNSPECIFIED VOMITING TYPE: ICD-10-CM

## 2017-05-27 PROBLEM — A41.9 SEPTIC SHOCK(785.52): Status: ACTIVE | Noted: 2017-05-27

## 2017-05-27 PROBLEM — R65.21 SEPTIC SHOCK(785.52): Status: ACTIVE | Noted: 2017-05-27

## 2017-05-27 LAB
ALBUMIN SERPL BCP-MCNC: 2.8 G/DL (ref 3.2–4.9)
ALBUMIN SERPL BCP-MCNC: 3.1 G/DL (ref 3.2–4.9)
ALBUMIN/GLOB SERPL: 1.2 G/DL
ALBUMIN/GLOB SERPL: 1.2 G/DL
ALP SERPL-CCNC: 184 U/L (ref 30–99)
ALP SERPL-CCNC: 194 U/L (ref 30–99)
ALT SERPL-CCNC: 42 U/L (ref 2–50)
ALT SERPL-CCNC: 47 U/L (ref 2–50)
AMPHET UR QL SCN: NEGATIVE
ANION GAP SERPL CALC-SCNC: 14 MMOL/L (ref 0–11.9)
ANION GAP SERPL CALC-SCNC: 18 MMOL/L (ref 0–11.9)
ANION GAP SERPL CALC-SCNC: 25 MMOL/L (ref 0–11.9)
APPEARANCE UR: CLEAR
APTT PPP: 30.3 SEC (ref 24.7–36)
AST SERPL-CCNC: 53 U/L (ref 12–45)
AST SERPL-CCNC: 68 U/L (ref 12–45)
BACTERIA #/AREA URNS HPF: ABNORMAL /HPF
BARBITURATES UR QL SCN: NEGATIVE
BASE EXCESS BLDA CALC-SCNC: -9 MMOL/L (ref -4–3)
BASOPHILS # BLD AUTO: 0.5 % (ref 0–1.8)
BASOPHILS # BLD AUTO: 0.5 % (ref 0–1.8)
BASOPHILS # BLD: 0.06 K/UL (ref 0–0.12)
BASOPHILS # BLD: 0.09 K/UL (ref 0–0.12)
BENZODIAZ UR QL SCN: NEGATIVE
BILIRUB SERPL-MCNC: 0.7 MG/DL (ref 0.1–1.5)
BILIRUB SERPL-MCNC: 0.7 MG/DL (ref 0.1–1.5)
BILIRUB UR QL STRIP.AUTO: NEGATIVE
BLOOD CULTURE HOLD CXBCH: NORMAL
BLOOD CULTURE HOLD CXBCH: NORMAL
BODY TEMPERATURE: 36.7 CENTIGRADE
BUN SERPL-MCNC: 79 MG/DL (ref 8–22)
BUN SERPL-MCNC: 79 MG/DL (ref 8–22)
BUN SERPL-MCNC: 95 MG/DL (ref 8–22)
BZE UR QL SCN: NEGATIVE
C DIFF DNA SPEC QL NAA+PROBE: NEGATIVE
C DIFF TOX A+B STL QL IA: NEGATIVE
C DIFF TOX GENS STL QL NAA+PROBE: ABNORMAL
CALCIUM SERPL-MCNC: 6.6 MG/DL (ref 8.5–10.5)
CALCIUM SERPL-MCNC: 7.3 MG/DL (ref 8.5–10.5)
CALCIUM SERPL-MCNC: 7.4 MG/DL (ref 8.5–10.5)
CANNABINOIDS UR QL SCN: NEGATIVE
CHLORIDE SERPL-SCNC: 95 MMOL/L (ref 96–112)
CHLORIDE SERPL-SCNC: 96 MMOL/L (ref 96–112)
CHLORIDE SERPL-SCNC: 96 MMOL/L (ref 96–112)
CK SERPL-CCNC: 145 U/L (ref 0–154)
CO2 SERPL-SCNC: 17 MMOL/L (ref 20–33)
CO2 SERPL-SCNC: 22 MMOL/L (ref 20–33)
CO2 SERPL-SCNC: 9 MMOL/L (ref 20–33)
COLOR UR: ABNORMAL
CORTIS SERPL-MCNC: 10.1 UG/DL (ref 0–23)
CREAT SERPL-MCNC: 4.64 MG/DL (ref 0.5–1.4)
CREAT SERPL-MCNC: 4.96 MG/DL (ref 0.5–1.4)
CREAT SERPL-MCNC: 5.92 MG/DL (ref 0.5–1.4)
CREAT UR-MCNC: 31 MG/DL
EKG IMPRESSION: NORMAL
EOSINOPHIL # BLD AUTO: 0 K/UL (ref 0–0.51)
EOSINOPHIL # BLD AUTO: 0.07 K/UL (ref 0–0.51)
EOSINOPHIL NFR BLD: 0 % (ref 0–6.9)
EOSINOPHIL NFR BLD: 0.6 % (ref 0–6.9)
EPI CELLS #/AREA URNS HPF: ABNORMAL /HPF
ERYTHROCYTE [DISTWIDTH] IN BLOOD BY AUTOMATED COUNT: 42.3 FL (ref 35.9–50)
ERYTHROCYTE [DISTWIDTH] IN BLOOD BY AUTOMATED COUNT: 43.3 FL (ref 35.9–50)
GFR SERPL CREATININE-BSD FRML MDRD: 10 ML/MIN/1.73 M 2
GFR SERPL CREATININE-BSD FRML MDRD: 8 ML/MIN/1.73 M 2
GFR SERPL CREATININE-BSD FRML MDRD: 9 ML/MIN/1.73 M 2
GLOBULIN SER CALC-MCNC: 2.4 G/DL (ref 1.9–3.5)
GLOBULIN SER CALC-MCNC: 2.6 G/DL (ref 1.9–3.5)
GLUCOSE SERPL-MCNC: 114 MG/DL (ref 65–99)
GLUCOSE SERPL-MCNC: 132 MG/DL (ref 65–99)
GLUCOSE SERPL-MCNC: 91 MG/DL (ref 65–99)
GLUCOSE UR STRIP.AUTO-MCNC: ABNORMAL MG/DL
HCO3 BLDA-SCNC: 15 MMOL/L (ref 17–25)
HCT VFR BLD AUTO: 30.4 % (ref 37–47)
HCT VFR BLD AUTO: 34 % (ref 37–47)
HEMOCCULT SP1 STL QL: POSITIVE
HGB BLD-MCNC: 10.2 G/DL (ref 12–16)
HGB BLD-MCNC: 11.2 G/DL (ref 12–16)
HYALINE CASTS #/AREA URNS LPF: ABNORMAL /LPF
IMM GRANULOCYTES # BLD AUTO: 0.03 K/UL (ref 0–0.11)
IMM GRANULOCYTES # BLD AUTO: 0.05 K/UL (ref 0–0.11)
IMM GRANULOCYTES NFR BLD AUTO: 0.3 % (ref 0–0.9)
IMM GRANULOCYTES NFR BLD AUTO: 0.3 % (ref 0–0.9)
INHALED O2 FLOW RATE: 2 L/MIN (ref 2–10)
INR PPP: 1.08 (ref 0.87–1.13)
KETONES UR STRIP.AUTO-MCNC: NEGATIVE MG/DL
LACTATE BLD-SCNC: 1.3 MMOL/L (ref 0.5–2)
LACTATE BLD-SCNC: 1.9 MMOL/L (ref 0.5–2)
LACTATE BLD-SCNC: 5.9 MMOL/L (ref 0.5–2)
LEUKOCYTE ESTERASE UR QL STRIP.AUTO: NEGATIVE
LIPASE SERPL-CCNC: 49 U/L (ref 11–82)
LYMPHOCYTES # BLD AUTO: 1.62 K/UL (ref 1–4.8)
LYMPHOCYTES # BLD AUTO: 1.84 K/UL (ref 1–4.8)
LYMPHOCYTES NFR BLD: 16.2 % (ref 22–41)
LYMPHOCYTES NFR BLD: 9.6 % (ref 22–41)
MCH RBC QN AUTO: 29.3 PG (ref 27–33)
MCH RBC QN AUTO: 29.6 PG (ref 27–33)
MCHC RBC AUTO-ENTMCNC: 32.9 G/DL (ref 33.6–35)
MCHC RBC AUTO-ENTMCNC: 33.6 G/DL (ref 33.6–35)
MCV RBC AUTO: 88.1 FL (ref 81.4–97.8)
MCV RBC AUTO: 89 FL (ref 81.4–97.8)
MDMA UR QL SCN: NEGATIVE
METHADONE UR QL SCN: NEGATIVE
MICRO URNS: ABNORMAL
MONOCYTES # BLD AUTO: 0.76 K/UL (ref 0–0.85)
MONOCYTES # BLD AUTO: 0.81 K/UL (ref 0–0.85)
MONOCYTES NFR BLD AUTO: 4.5 % (ref 0–13.4)
MONOCYTES NFR BLD AUTO: 7.1 % (ref 0–13.4)
NEUTROPHILS # BLD AUTO: 14.42 K/UL (ref 2–7.15)
NEUTROPHILS # BLD AUTO: 8.56 K/UL (ref 2–7.15)
NEUTROPHILS NFR BLD: 75.3 % (ref 44–72)
NEUTROPHILS NFR BLD: 85.1 % (ref 44–72)
NITRITE UR QL STRIP.AUTO: NEGATIVE
NRBC # BLD AUTO: 0 K/UL
NRBC # BLD AUTO: 0 K/UL
NRBC BLD AUTO-RTO: 0 /100 WBC
NRBC BLD AUTO-RTO: 0 /100 WBC
OPIATES UR QL SCN: POSITIVE
OXYCODONE UR QL SCN: NEGATIVE
PCO2 BLDA: 26.2 MMHG (ref 26–37)
PCO2 TEMP ADJ BLDA: 25.9 MMHG (ref 26–37)
PCP UR QL SCN: NEGATIVE
PH BLDA: 7.36 [PH] (ref 7.4–7.5)
PH TEMP ADJ BLDA: 7.37 [PH] (ref 7.4–7.5)
PH UR STRIP.AUTO: 5 [PH]
PHOSPHATE SERPL-MCNC: 6.5 MG/DL (ref 2.5–4.5)
PLATELET # BLD AUTO: 175 K/UL (ref 164–446)
PLATELET # BLD AUTO: 309 K/UL (ref 164–446)
PMV BLD AUTO: 10.2 FL (ref 9–12.9)
PMV BLD AUTO: 10.3 FL (ref 9–12.9)
PO2 BLDA: 105.7 MMHG (ref 64–87)
PO2 TEMP ADJ BLDA: 103.9 MMHG (ref 64–87)
POTASSIUM SERPL-SCNC: 3.8 MMOL/L (ref 3.6–5.5)
POTASSIUM SERPL-SCNC: 3.9 MMOL/L (ref 3.6–5.5)
POTASSIUM SERPL-SCNC: 4.9 MMOL/L (ref 3.6–5.5)
PROPOXYPH UR QL SCN: NEGATIVE
PROT SERPL-MCNC: 5.2 G/DL (ref 6–8.2)
PROT SERPL-MCNC: 5.7 G/DL (ref 6–8.2)
PROT UR QL STRIP: NEGATIVE MG/DL
PROTHROMBIN TIME: 14.3 SEC (ref 12–14.6)
RBC # BLD AUTO: 3.45 M/UL (ref 4.2–5.4)
RBC # BLD AUTO: 3.82 M/UL (ref 4.2–5.4)
RBC # URNS HPF: ABNORMAL /HPF
RBC UR QL AUTO: ABNORMAL
SAO2 % BLDA: 96.5 % (ref 93–99)
SODIUM SERPL-SCNC: 129 MMOL/L (ref 135–145)
SODIUM SERPL-SCNC: 131 MMOL/L (ref 135–145)
SODIUM SERPL-SCNC: 132 MMOL/L (ref 135–145)
SODIUM UR-SCNC: 113 MMOL/L
SP GR UR STRIP.AUTO: 1
WBC # BLD AUTO: 11.4 K/UL (ref 4.8–10.8)
WBC # BLD AUTO: 16.9 K/UL (ref 4.8–10.8)
WBC #/AREA URNS HPF: ABNORMAL /HPF

## 2017-05-27 PROCEDURE — 770022 HCHG ROOM/CARE - ICU (200)

## 2017-05-27 PROCEDURE — 700105 HCHG RX REV CODE 258

## 2017-05-27 PROCEDURE — 76700 US EXAM ABDOM COMPLETE: CPT

## 2017-05-27 PROCEDURE — 700102 HCHG RX REV CODE 250 W/ 637 OVERRIDE(OP): Performed by: HOSPITALIST

## 2017-05-27 PROCEDURE — 93005 ELECTROCARDIOGRAM TRACING: CPT | Performed by: EMERGENCY MEDICINE

## 2017-05-27 PROCEDURE — 700111 HCHG RX REV CODE 636 W/ 250 OVERRIDE (IP): Performed by: INTERNAL MEDICINE

## 2017-05-27 PROCEDURE — 82570 ASSAY OF URINE CREATININE: CPT

## 2017-05-27 PROCEDURE — 71010 DX-CHEST-PORTABLE (1 VIEW): CPT

## 2017-05-27 PROCEDURE — 96375 TX/PRO/DX INJ NEW DRUG ADDON: CPT

## 2017-05-27 PROCEDURE — 96366 THER/PROPH/DIAG IV INF ADDON: CPT

## 2017-05-27 PROCEDURE — 87040 BLOOD CULTURE FOR BACTERIA: CPT | Mod: 91

## 2017-05-27 PROCEDURE — 96365 THER/PROPH/DIAG IV INF INIT: CPT

## 2017-05-27 PROCEDURE — 700105 HCHG RX REV CODE 258: Performed by: HOSPITALIST

## 2017-05-27 PROCEDURE — 700111 HCHG RX REV CODE 636 W/ 250 OVERRIDE (IP): Performed by: EMERGENCY MEDICINE

## 2017-05-27 PROCEDURE — B548ZZA ULTRASONOGRAPHY OF SUPERIOR VENA CAVA, GUIDANCE: ICD-10-PCS | Performed by: EMERGENCY MEDICINE

## 2017-05-27 PROCEDURE — 700101 HCHG RX REV CODE 250: Performed by: EMERGENCY MEDICINE

## 2017-05-27 PROCEDURE — 82533 TOTAL CORTISOL: CPT

## 2017-05-27 PROCEDURE — 36556 INSERT NON-TUNNEL CV CATH: CPT

## 2017-05-27 PROCEDURE — 02HV33Z INSERTION OF INFUSION DEVICE INTO SUPERIOR VENA CAVA, PERCUTANEOUS APPROACH: ICD-10-PCS | Performed by: EMERGENCY MEDICINE

## 2017-05-27 PROCEDURE — 82270 OCCULT BLOOD FECES: CPT

## 2017-05-27 PROCEDURE — A9270 NON-COVERED ITEM OR SERVICE: HCPCS | Performed by: INTERNAL MEDICINE

## 2017-05-27 PROCEDURE — 82550 ASSAY OF CK (CPK): CPT

## 2017-05-27 PROCEDURE — 700102 HCHG RX REV CODE 250 W/ 637 OVERRIDE(OP): Performed by: COLON & RECTAL SURGERY

## 2017-05-27 PROCEDURE — 80307 DRUG TEST PRSMV CHEM ANLYZR: CPT

## 2017-05-27 PROCEDURE — 700105 HCHG RX REV CODE 258: Performed by: INTERNAL MEDICINE

## 2017-05-27 PROCEDURE — 87324 CLOSTRIDIUM AG IA: CPT

## 2017-05-27 PROCEDURE — 700101 HCHG RX REV CODE 250: Performed by: INTERNAL MEDICINE

## 2017-05-27 PROCEDURE — 51702 INSERT TEMP BLADDER CATH: CPT

## 2017-05-27 PROCEDURE — 81001 URINALYSIS AUTO W/SCOPE: CPT

## 2017-05-27 PROCEDURE — 700111 HCHG RX REV CODE 636 W/ 250 OVERRIDE (IP): Performed by: HOSPITALIST

## 2017-05-27 PROCEDURE — A9270 NON-COVERED ITEM OR SERVICE: HCPCS | Performed by: HOSPITALIST

## 2017-05-27 PROCEDURE — A9270 NON-COVERED ITEM OR SERVICE: HCPCS | Performed by: COLON & RECTAL SURGERY

## 2017-05-27 PROCEDURE — 85610 PROTHROMBIN TIME: CPT

## 2017-05-27 PROCEDURE — 303105 HCHG CATHETER EXTRA

## 2017-05-27 PROCEDURE — 80048 BASIC METABOLIC PNL TOTAL CA: CPT

## 2017-05-27 PROCEDURE — 700105 HCHG RX REV CODE 258: Performed by: EMERGENCY MEDICINE

## 2017-05-27 PROCEDURE — 85730 THROMBOPLASTIN TIME PARTIAL: CPT

## 2017-05-27 PROCEDURE — 96368 THER/DIAG CONCURRENT INF: CPT

## 2017-05-27 PROCEDURE — 84100 ASSAY OF PHOSPHORUS: CPT

## 2017-05-27 PROCEDURE — 700102 HCHG RX REV CODE 250 W/ 637 OVERRIDE(OP): Performed by: INTERNAL MEDICINE

## 2017-05-27 PROCEDURE — 700111 HCHG RX REV CODE 636 W/ 250 OVERRIDE (IP)

## 2017-05-27 PROCEDURE — 82803 BLOOD GASES ANY COMBINATION: CPT

## 2017-05-27 PROCEDURE — 80053 COMPREHEN METABOLIC PANEL: CPT

## 2017-05-27 PROCEDURE — 36415 COLL VENOUS BLD VENIPUNCTURE: CPT

## 2017-05-27 PROCEDURE — 87493 C DIFF AMPLIFIED PROBE: CPT

## 2017-05-27 PROCEDURE — 85025 COMPLETE CBC W/AUTO DIFF WBC: CPT

## 2017-05-27 PROCEDURE — 84300 ASSAY OF URINE SODIUM: CPT

## 2017-05-27 PROCEDURE — 83690 ASSAY OF LIPASE: CPT

## 2017-05-27 PROCEDURE — C1751 CATH, INF, PER/CENT/MIDLINE: HCPCS

## 2017-05-27 PROCEDURE — 99291 CRITICAL CARE FIRST HOUR: CPT

## 2017-05-27 PROCEDURE — 87086 URINE CULTURE/COLONY COUNT: CPT

## 2017-05-27 PROCEDURE — 83605 ASSAY OF LACTIC ACID: CPT

## 2017-05-27 PROCEDURE — 99291 CRITICAL CARE FIRST HOUR: CPT | Performed by: INTERNAL MEDICINE

## 2017-05-27 RX ORDER — AMOXICILLIN 250 MG
2 CAPSULE ORAL 2 TIMES DAILY
Status: DISCONTINUED | OUTPATIENT
Start: 2017-05-27 | End: 2017-05-27

## 2017-05-27 RX ORDER — ASCORBIC ACID 500 MG/ML
1500 INJECTION, SOLUTION INTRAMUSCULAR; INTRAVENOUS; SUBCUTANEOUS EVERY 6 HOURS
Status: DISCONTINUED | OUTPATIENT
Start: 2017-05-27 | End: 2017-05-27

## 2017-05-27 RX ORDER — ONDANSETRON 2 MG/ML
4 INJECTION INTRAMUSCULAR; INTRAVENOUS ONCE
Status: COMPLETED | OUTPATIENT
Start: 2017-05-27 | End: 2017-05-27

## 2017-05-27 RX ORDER — ACETAMINOPHEN 325 MG/1
650 TABLET ORAL EVERY 6 HOURS PRN
Status: DISCONTINUED | OUTPATIENT
Start: 2017-05-27 | End: 2017-06-05 | Stop reason: HOSPADM

## 2017-05-27 RX ORDER — ONDANSETRON 2 MG/ML
4 INJECTION INTRAMUSCULAR; INTRAVENOUS EVERY 4 HOURS PRN
Status: DISCONTINUED | OUTPATIENT
Start: 2017-05-27 | End: 2017-06-05 | Stop reason: HOSPADM

## 2017-05-27 RX ORDER — OXYCODONE HYDROCHLORIDE 5 MG/1
5 TABLET ORAL EVERY 4 HOURS PRN
Status: DISCONTINUED | OUTPATIENT
Start: 2017-05-27 | End: 2017-06-05 | Stop reason: HOSPADM

## 2017-05-27 RX ORDER — FAMOTIDINE 20 MG/1
20 TABLET, FILM COATED ORAL DAILY
Status: DISCONTINUED | OUTPATIENT
Start: 2017-05-27 | End: 2017-05-27

## 2017-05-27 RX ORDER — OMEPRAZOLE 20 MG/1
20 CAPSULE, DELAYED RELEASE ORAL 2 TIMES DAILY
Status: DISCONTINUED | OUTPATIENT
Start: 2017-05-27 | End: 2017-06-05 | Stop reason: HOSPADM

## 2017-05-27 RX ORDER — LISINOPRIL 20 MG/1
20 TABLET ORAL DAILY
COMMUNITY

## 2017-05-27 RX ORDER — SODIUM CHLORIDE 9 MG/ML
30 INJECTION, SOLUTION INTRAVENOUS
Status: DISCONTINUED | OUTPATIENT
Start: 2017-05-27 | End: 2017-05-30

## 2017-05-27 RX ORDER — PROMETHAZINE HYDROCHLORIDE 25 MG/1
25 TABLET ORAL EVERY 6 HOURS PRN
Status: DISCONTINUED | OUTPATIENT
Start: 2017-05-27 | End: 2017-06-05 | Stop reason: HOSPADM

## 2017-05-27 RX ORDER — SODIUM CHLORIDE 9 MG/ML
INJECTION, SOLUTION INTRAVENOUS CONTINUOUS
Status: DISCONTINUED | OUTPATIENT
Start: 2017-05-27 | End: 2017-05-30

## 2017-05-27 RX ORDER — POLYETHYLENE GLYCOL 3350 17 G/17G
1 POWDER, FOR SOLUTION ORAL
Status: DISCONTINUED | OUTPATIENT
Start: 2017-05-27 | End: 2017-05-27

## 2017-05-27 RX ORDER — SODIUM CHLORIDE 9 MG/ML
1000 INJECTION, SOLUTION INTRAVENOUS ONCE
Status: COMPLETED | OUTPATIENT
Start: 2017-05-27 | End: 2017-05-27

## 2017-05-27 RX ORDER — OXYCODONE HYDROCHLORIDE 10 MG/1
10 TABLET ORAL EVERY 4 HOURS PRN
Status: DISCONTINUED | OUTPATIENT
Start: 2017-05-27 | End: 2017-06-05 | Stop reason: HOSPADM

## 2017-05-27 RX ORDER — MORPHINE SULFATE 4 MG/ML
4 INJECTION, SOLUTION INTRAMUSCULAR; INTRAVENOUS ONCE
Status: COMPLETED | OUTPATIENT
Start: 2017-05-27 | End: 2017-05-27

## 2017-05-27 RX ORDER — SODIUM CHLORIDE 9 MG/ML
1000 INJECTION, SOLUTION INTRAVENOUS
Status: DISCONTINUED | OUTPATIENT
Start: 2017-05-27 | End: 2017-05-30

## 2017-05-27 RX ORDER — HEPARIN SODIUM 5000 [USP'U]/ML
5000 INJECTION, SOLUTION INTRAVENOUS; SUBCUTANEOUS EVERY 8 HOURS
Status: DISCONTINUED | OUTPATIENT
Start: 2017-05-27 | End: 2017-06-01

## 2017-05-27 RX ORDER — BISACODYL 10 MG
10 SUPPOSITORY, RECTAL RECTAL
Status: DISCONTINUED | OUTPATIENT
Start: 2017-05-27 | End: 2017-05-27

## 2017-05-27 RX ADMIN — PIPERACILLIN SODIUM AND TAZOBACTAM SODIUM 4.5 G: 4; .5 INJECTION, POWDER, FOR SOLUTION INTRAVENOUS at 12:10

## 2017-05-27 RX ADMIN — VANCOMYCIN HYDROCHLORIDE 125 MG: 10 INJECTION, POWDER, LYOPHILIZED, FOR SOLUTION INTRAVENOUS at 18:11

## 2017-05-27 RX ADMIN — MORPHINE SULFATE 4 MG: 4 INJECTION INTRAVENOUS at 08:59

## 2017-05-27 RX ADMIN — ASCORBIC ACID: 500 INJECTION, SOLUTION INTRAMUSCULAR; INTRAVENOUS; SUBCUTANEOUS at 13:24

## 2017-05-27 RX ADMIN — ONDANSETRON 4 MG: 2 INJECTION INTRAMUSCULAR; INTRAVENOUS at 08:29

## 2017-05-27 RX ADMIN — SODIUM CHLORIDE 1000 ML: 9 INJECTION, SOLUTION INTRAVENOUS at 08:10

## 2017-05-27 RX ADMIN — SODIUM BICARBONATE: 84 INJECTION, SOLUTION INTRAVENOUS at 08:49

## 2017-05-27 RX ADMIN — OXYCODONE HYDROCHLORIDE 5 MG: 5 TABLET ORAL at 18:15

## 2017-05-27 RX ADMIN — NOREPINEPHRINE BITARTRATE 5 MCG/MIN: 1 INJECTION INTRAVENOUS at 07:30

## 2017-05-27 RX ADMIN — FAMOTIDINE 20 MG: 10 INJECTION, SOLUTION INTRAVENOUS at 11:09

## 2017-05-27 RX ADMIN — HEPARIN SODIUM 5000 UNITS: 5000 INJECTION, SOLUTION INTRAVENOUS; SUBCUTANEOUS at 20:52

## 2017-05-27 RX ADMIN — ONDANSETRON 4 MG: 2 INJECTION INTRAMUSCULAR; INTRAVENOUS at 12:22

## 2017-05-27 RX ADMIN — OMEPRAZOLE 20 MG: 20 CAPSULE, DELAYED RELEASE ORAL at 20:47

## 2017-05-27 RX ADMIN — SODIUM CHLORIDE: 9 INJECTION, SOLUTION INTRAVENOUS at 11:10

## 2017-05-27 RX ADMIN — HYDROCORTISONE SODIUM SUCCINATE 50 MG: 100 INJECTION, POWDER, FOR SOLUTION INTRAMUSCULAR; INTRAVENOUS at 18:11

## 2017-05-27 RX ADMIN — ASCORBIC ACID: 500 INJECTION, SOLUTION INTRAMUSCULAR; INTRAVENOUS; SUBCUTANEOUS at 18:11

## 2017-05-27 RX ADMIN — VANCOMYCIN HYDROCHLORIDE 125 MG: 10 INJECTION, POWDER, LYOPHILIZED, FOR SOLUTION INTRAVENOUS at 13:24

## 2017-05-27 RX ADMIN — THIAMINE HYDROCHLORIDE 200 MG: 100 INJECTION, SOLUTION INTRAMUSCULAR; INTRAVENOUS at 22:36

## 2017-05-27 RX ADMIN — THIAMINE HYDROCHLORIDE 250 MG: 100 INJECTION, SOLUTION INTRAMUSCULAR; INTRAVENOUS at 11:31

## 2017-05-27 RX ADMIN — VANCOMYCIN HYDROCHLORIDE 1800 MG: 100 INJECTION, POWDER, LYOPHILIZED, FOR SOLUTION INTRAVENOUS at 11:10

## 2017-05-27 RX ADMIN — PROCHLORPERAZINE EDISYLATE 10 MG: 5 INJECTION INTRAMUSCULAR; INTRAVENOUS at 21:09

## 2017-05-27 RX ADMIN — OXYCODONE HYDROCHLORIDE 5 MG: 5 TABLET ORAL at 13:24

## 2017-05-27 RX ADMIN — FENTANYL CITRATE 25 MCG: 50 INJECTION, SOLUTION INTRAMUSCULAR; INTRAVENOUS at 11:31

## 2017-05-27 RX ADMIN — HYDROCORTISONE SODIUM SUCCINATE 50 MG: 100 INJECTION, POWDER, FOR SOLUTION INTRAMUSCULAR; INTRAVENOUS at 14:16

## 2017-05-27 RX ADMIN — PIPERACILLIN SODIUM AND TAZOBACTAM SODIUM 4.5 G: 4; .5 INJECTION, POWDER, FOR SOLUTION INTRAVENOUS at 18:11

## 2017-05-27 RX ADMIN — OMEPRAZOLE 20 MG: 20 CAPSULE, DELAYED RELEASE ORAL at 13:24

## 2017-05-27 RX ADMIN — CALCIUM GLUCONATE 2 G: 94 INJECTION, SOLUTION INTRAVENOUS at 15:22

## 2017-05-27 ASSESSMENT — ENCOUNTER SYMPTOMS
NAUSEA: 1
PALPITATIONS: 0
CHILLS: 1
DIARRHEA: 1
HEMOPTYSIS: 0
FALLS: 0
DIAPHORESIS: 1
WEIGHT LOSS: 1
HEARTBURN: 1
WHEEZING: 0
FLANK PAIN: 0
SORE THROAT: 0
WEAKNESS: 0
HEADACHES: 0
DIZZINESS: 0
ABDOMINAL PAIN: 1
SHORTNESS OF BREATH: 1
FEVER: 1
MYALGIAS: 1
COUGH: 1
SPUTUM PRODUCTION: 0
BLOOD IN STOOL: 0
SEIZURES: 0
VOMITING: 1
NERVOUS/ANXIOUS: 1
LOSS OF CONSCIOUSNESS: 0
BLURRED VISION: 0

## 2017-05-27 ASSESSMENT — COPD QUESTIONNAIRES
DO YOU EVER COUGH UP ANY MUCUS OR PHLEGM?: NO/ONLY WITH OCCASIONAL COLDS OR INFECTIONS
HAVE YOU SMOKED AT LEAST 100 CIGARETTES IN YOUR ENTIRE LIFE: NO/DON'T KNOW
DURING THE PAST 4 WEEKS HOW MUCH DID YOU FEEL SHORT OF BREATH: NONE/LITTLE OF THE TIME
COPD SCREENING SCORE: 0

## 2017-05-27 ASSESSMENT — PAIN SCALES - GENERAL
PAINLEVEL_OUTOF10: 0
PAINLEVEL_OUTOF10: 7
PAINLEVEL_OUTOF10: 5
PAINLEVEL_OUTOF10: 2

## 2017-05-27 ASSESSMENT — LIFESTYLE VARIABLES
EVER_SMOKED: NEVER
ALCOHOL_USE: NO
SUBSTANCE_ABUSE: 0
DO YOU DRINK ALCOHOL: NO
EVER_SMOKED: NEVER

## 2017-05-27 NOTE — PROGRESS NOTES
Pt wants to designate her  ([facility] - [phone]) as caregiver - paperwork completed and placed in chart.

## 2017-05-27 NOTE — ED NOTES
lab from Lab called with critical result of CO2 9, BUN 9, Creat 5.9 at 0738. Critical lab result read back to lab.   Dr. larsen notified of critical lab result at 0738.  Critical lab result read back by Dr. larsen.

## 2017-05-27 NOTE — CONSULTS
DATE OF SERVICE:  05/27/2017    CHIEF COMPLAINT:  Nausea, vomiting, weakness, diarrhea exacerbation over the   last 4 days.    HISTORY OF PRESENT ILLNESS:  The patient is a 51-year-old female with a   history of chronic immunosuppression on steroids and immunomodulatory medicine   for rheumatoid arthritis, adalimumab every 2 weeks who says that   approximately 4 days ago she felt a sense of weakness, dizziness,   lightheadedness when vacuuming at home and had to lie down.  She then   developed nausea and later vomited and then in the days that followed felt an   increase in her chronic abdominal pain and developed diarrhea.    The patient has an approximately 1-year history of chronic abdominal pain and   has had an ongoing workup with her gastroenterologist and was scheduled to   have an endoscopic procedure next week in fact.  She denies any hematemesis   and describes the pain as subxiphoid in location, but also more diffusely   gesturing with both hands up and down on anterolateral abdomen, more diffusely   and bilaterally.  She yesterday experienced pain that was more in the mid   sternum and radiated to her back.  She has previously had C. diff colitis, but   she thinks that was resolved or in remission.  She has had no abdominal   surgery other than a hysteroscopy.    PAST MEDICAL HISTORY:  1.  Rheumatoid arthritis.  2.  Systemic lupus erythematosus.  3.  Chronic immunosuppression.  4.  Episodes of pancreatitis in the past.  5.  Obesity.  6.  Clostridium difficile colitis in the past.    ALLERGIES:  CODEINE.    MEDICATIONS:  Includes prednisone 15 mg daily, adalimumab 40 mg injection   every 2 weeks, ergocalciferol, gemfibrozil, Plaquenil, Prinivil, Ditropan,   folate, Dulcolax, milk of magnesia, previously was on methotrexate.    SOCIAL HISTORY:  Her  is disabled.  She denies tobacco, has never   smoked.  Denies alcohol.    FAMILY HISTORY:  Mother with diabetes, hypertension.  Father with  diabetes.    REVIEW OF SYSTEMS:  NEUROLOGIC:  Denies strokes or seizures.  CARDIAC:  Denies heart attacks or palpitations.  LUNGS:  Did have some shortness of breath with her weakness and had felt some   shortness of breath earlier this morning.  Denies cough, hematemesis.  GASTROINTESTINAL:  Chronic abdominal pain, episodes of pancreatitis, previous   Clostridium difficile colitis, on chronic steroids.  GENITOURINARY:  Denies dysuria, hematuria, was noted to have a history of   fibroids.  EYES:  No recent visual changes.  EARS:  No hearing aids, hearing loss.    PHYSICAL EXAMINATION:  GENERAL:  Nontoxic currently, resting comfortably in bed in no acute distress.  VITAL SIGNS:  Height is 5 feet 3 inches, weight is 72 kilograms.  Heart rate   is 104, blood pressure last recorded as 104/52, 100% saturation on 2 liters.    She is pleasant, alert and oriented and answers questions easily.  HEENT:  Eyes are anicteric, extraocular movements intact.  Nose and ears are   externally normal.  Hearing grossly normal.  Oropharynx clear.  NECK:  Supple, good range of motion.  CHEST AND RESPIRATORY:  Unlabored breathing, normal excursions.  CARDIOVASCULAR:  Regular rate and rhythm.  ABDOMEN:  Obese, soft, mild to moderate diffuse tenderness, more in the upper   abdomen, but bilaterally without peritonitis or rigidity.  EXTREMITIES:  Warm and acyanotic with evidence of arthritis.  NEUROLOGIC:  Nonfocal.  Normal power and strength throughout.  Mood, affect   and judgment appear within normal limits.    LABORATORY STUDIES:  White blood count of 16.9, hemoglobin of 11.2.  Remainder   of the laboratory studies reviewed as depicted, creatinine of 5.9.      Metabolic acidosis noted.  AST and ALT 68 and 47, alkaline phosphatase 194,   total bilirubin of 0.7.    IMAGING:  A report of the outside CT scan shows no evidence of perforation.    The ultrasound study here demonstrates no gallstones and a distended   gallbladder without ductal  dilatation, fatty liver changes noted.    IMPRESSION:  1.  Sepsis with multiorgan dysfunction.  2.  Nausea, vomiting, diarrhea.  3.  Acute on chronic abdominal pain.  4.  Chronic immunosuppression.  5.  Rheumatoid arthritis.  6.  Systemic lupus erythematosus.  7.  Hypertension.  8.  Acute renal failure.    PLAN:  I talked at length with the patient about the nature of her symptoms,   nature of the findings.  In reviewing her physical exam and her laboratory and   radiographic studies, there is not an urgent indication for surgical   intervention at this time.  It is unclear exactly what the nature of the   chronic abdominal pain has been as well as the acute exacerbation is, not   obvious in its etiology.  I am concerned that the chronic steroids place her   at high risk for cryptogenic ulcer disease, but at this point in time, there   is not a perforation.  A penetrating ulcer; however, would be a suspicion.    Escalation from H2 inhibitor to a proton pump inhibitor would be warranted.    Further observation and supportive care and then engagement with her   gastroenterologist may be the next steps.  We will continue to follow.       ____________________________________     MD MADIHA MERRILL / VICTOR HUGO    DD:  05/27/2017 11:54:10  DT:  05/27/2017 15:10:02    D#:  [id number]  Job#:  [id number]

## 2017-05-27 NOTE — ED NOTES
Llanos inserted. Pt urinating frequently but feels like she is not emptying bladder. Pt with large urine output with llanos placement.

## 2017-05-27 NOTE — ED NOTES
Med rec complete per pt and RX bottles at bedside  Called pharmacy to verify strength of Methotrexate, pharmacy closed  Allergies reviewed  No oral ABX within last 30 days

## 2017-05-27 NOTE — IP AVS SNAPSHOT
" <p align=\"LEFT\"><IMG SRC=\"//EMRWB/blob$/Images/[email or web address]\" alt=\"Image\" WIDTH=\"50%\" HEIGHT=\"200\" BORDER=\"\"></p>                   Name:Brandi Leggett  Medical Record Number:[id number]  CSN: [id number]    YOB: 1965   Age: 51 y.o.  Sex: female  HT:1.6 m (5' 3\") WT: 85.3 kg (188 lb 0.8 oz)          Admit Date: 5/27/2017     Discharge Date:   Today's Date: 6/5/2017  Attending Doctor:  Ranjan Boone M.D.                  Allergies:  Codeine          Your appointments     Jun 29, 2017  1:30 PM   Follow Up Visit with Yamilka Mcknight M.D.   [facility] (--)    [address] [place] [id number] [phone]           You will be receiving a confirmation call a few days before your appointment from our automated call confirmation system.                 Medication List      Take these Medications        Instructions    Adalimumab 40 MG/0.8ML Pnkt    Inject 40 mg as instructed every 14 days.   Dose:  40 mg       allopurinol 300 MG Tabs   Commonly known as:  ZYLOPRIM    Take 1 Tab by mouth every day.   Dose:  300 mg       cholestyramine 4 GM Pack   Commonly known as:  QUESTRAN,PREVALITE    Take 1 Packet by mouth 2 Times a Day for 26 days.   Dose:  4 g       * ergocalciferol 14797 UNIT capsule   What changed:  Another medication with the same name was added. Make sure you understand how and when to take each.   Commonly known as:  DRISDOL    Take 1 cap once weekly for 12 weeks.       * vitamin D (Ergocalciferol) 20331 UNITS Caps capsule   What changed:  You were already taking a medication with the same name, and this prescription was added. Make sure you understand how and when to take each.   Commonly known as:  DRISDOL    Take 1 Cap by mouth every 7 days for 55 days.   Dose:  56771 Units       folic acid 1 MG Tabs   Commonly known as:  FOLVITE    Take 2 tabs daily.       gemfibrozil 600 MG Tabs   Commonly known as:  LOPID    Take 600 mg by mouth 2 times a day.   Dose:  600 mg      " hydroxychloroquine 200 MG Tabs   Commonly known as:  PLAQUENIL    Take 1 Tab by mouth 2 times a day.   Dose:  200 mg       lisinopril 20 MG Tabs   Commonly known as:  PRINIVIL    Take 20 mg by mouth every day.   Dose:  20 mg       METHOTREXATE PO    Take 1 Tab by mouth every day.   Dose:  1 Tab       omeprazole 20 MG delayed-release capsule   Commonly known as:  PRILOSEC    Take 1 Cap by mouth every day.   Dose:  20 mg       oxybutynin 5 MG Tabs   Commonly known as:  DITROPAN    Take 5 mg by mouth 2 Times a Day.   Dose:  5 mg       phosphorus 155-852-130 MG tablet   Commonly known as:  K-PHOS-NEUTRAL, PHOSPHA 250 NEUTRAL    Doctor's comments:  Reassess with new labs   Take 1 Tab by mouth every 6 hours for 5 days.   Dose:  1 Tab       * predniSONE 5 MG Tabs   What changed:  Another medication with the same name was added. Make sure you understand how and when to take each.   Commonly known as:  DELTASONE    Take 15 mg by mouth every day.   Dose:  15 mg       * predniSONE 5 MG Tabs   What changed:  You were already taking a medication with the same name, and this prescription was added. Make sure you understand how and when to take each.   Commonly known as:  DELTASONE    Doctor's comments:  Follow up with PCP.   Take 3 Tabs by mouth every day.   Dose:  15 mg       sucralfate 1 GM/10ML Susp   Commonly known as:  CARAFATE    Take 10 mL by mouth every 6 hours.   Dose:  1 g       * Notice:  This list has 4 medication(s) that are the same as other medications prescribed for you. Read the directions carefully, and ask your doctor or other care provider to review them with you.

## 2017-05-27 NOTE — IP AVS SNAPSHOT
" Home Care Instructions                                                                                                                  Name:Brandi Leggett  Medical Record Number:[id number]  CSN: [id number]    YOB: 1965   Age: 51 y.o.  Sex: female  HT:1.6 m (5' 3\") WT: 85.3 kg (188 lb 0.8 oz)          Admit Date: 5/27/2017     Discharge Date:   Today's Date: 6/5/2017  Attending Doctor:  Ranjan Boone M.D.                  Allergies:  Codeine            Discharge Instructions       Discharge Instructions    Discharged to home by [facility] with escort. Discharged via wheelchair, hospital escort: Yes.  Special equipment needed: Not Applicable    Be sure to schedule a follow-up appointment with your primary care doctor or any specialists as instructed.     Discharge Plan:   Diet Plan: Discussed  Activity Level: Discussed  Confirmed Follow up Appointment: Patient to Call and Schedule Appointment  Confirmed Symptoms Management: Discussed  Medication Reconciliation Updated: Yes  Pneumococcal Vaccine Given - only chart on this line when given: Given (See MAR)  Influenza Vaccine Indication: Indicated: Not available from distributor/    I understand that a diet low in cholesterol, fat, and sodium is recommended for good health. Unless I have been given specific instructions below for another diet, I accept this instruction as my diet prescription.   Other diet: REGULAR    Special Instructions: None    · Is patient discharged on Warfarin / Coumadin?   No     · Is patient Post Blood Transfusion?  No    Depression / Suicide Risk    As you are discharged from this [facility] facility, it is important to learn how to keep safe from harming yourself.    Recognize the warning signs:  · Abrupt changes in personality, positive or negative- including increase in energy   · Giving away possessions  · Change in eating patterns- significant weight changes-  positive or negative  · Change in sleeping " patterns- unable to sleep or sleeping all the time   · Unwillingness or inability to communicate  · Depression  · Unusual sadness, discouragement and loneliness  · Talk of wanting to die  · Neglect of personal appearance   · Rebelliousness- reckless behavior  · Withdrawal from people/activities they love  · Confusion- inability to concentrate     If you or a loved one observes any of these behaviors or has concerns about self-harm, here's what you can do:  · Talk about it- your feelings and reasons for harming yourself  · Remove any means that you might use to hurt yourself (examples: pills, rope, extension cords, firearm)  · Get professional help from the community (Mental Health, Substance Abuse, psychological counseling)  · Do not be alone:Call your Safe Contact- someone whom you trust who will be there for you.  · Call your local CRISIS HOTLINE 715-6059 or 428-074-7621  · Call your local Children's Mobile Crisis Response Team [place] (935) 628-3284 or www.[employer]  · Call the toll free National Suicide Prevention Hotlines   · National Suicide Prevention Lifeline 945-249-GRWR (9134)  · National Hope Line Network 800-SUICIDE (779-7977)    Gastrointestinal Bleeding  Gastrointestinal (GI) bleeding means there is bleeding somewhere along the digestive tract, between the mouth and anus.  CAUSES   There are many different problems that can cause GI bleeding. Possible causes include:  · Esophagitis. This is inflammation, irritation, or swelling of the esophagus.  · Hemorrhoids. These are veins that are full of blood (engorged) in the rectum. They cause pain, inflammation, and may bleed.  · Anal fissures. These are areas of painful tearing which may bleed. They are often caused by passing hard stool.  · Diverticulosis. These are pouches that form on the colon over time, with age, and may bleed significantly.  · Diverticulitis. This is inflammation in areas with diverticulosis. It can cause pain, fever, and  bloody stools, although bleeding is rare.  · Polyps and cancer. Colon cancer often starts out as precancerous polyps.  · Gastritis and ulcers. Bleeding from the upper gastrointestinal tract (near the stomach) may travel through the intestines and produce black, sometimes tarry, often bad smelling stools. In certain cases, if the bleeding is fast enough, the stools may not be black, but red. This condition may be life-threatening.  SYMPTOMS   · Vomiting bright red blood or material that looks like coffee grounds.  · Bloody, black, or tarry stools.  DIAGNOSIS   Your caregiver may diagnose your condition by taking your history and performing a physical exam. More tests may be needed, including:  · X-rays and other imaging tests.  · Esophagogastroduodenoscopy (EGD). This test uses a flexible, lighted tube to look at your esophagus, stomach, and small intestine.  · Colonoscopy. This test uses a flexible, lighted tube to look at your colon.  TREATMENT   Treatment depends on the cause of your bleeding.   · For bleeding from the esophagus, stomach, small intestine, or colon, the caregiver doing your EGD or colonoscopy may be able to stop the bleeding as part of the procedure.  · Inflammation or infection of the colon can be treated with medicines.  · Many rectal problems can be treated with creams, suppositories, or warm baths.  · Surgery is sometimes needed.  · Blood transfusions are sometimes needed if you have lost a lot of blood.  If bleeding is slow, you may be allowed to go home. If there is a lot of bleeding, you will need to stay in the hospital for observation.  HOME CARE INSTRUCTIONS   · Take any medicines exactly as prescribed.  · Keep your stools soft by eating foods that are high in fiber. These foods include whole grains, legumes, fruits, and vegetables. Prunes (1 to 3 a day) work well for many people.  · Drink enough fluids to keep your urine clear or pale yellow.  SEEK IMMEDIATE MEDICAL CARE IF:   · Your  bleeding increases.  · You feel lightheaded, weak, or you faint.  · You have severe cramps in your back or abdomen.  · You pass large blood clots in your stool.  · Your problems are getting worse.  MAKE SURE YOU:   · Understand these instructions.  · Will watch your condition.  · Will get help right away if you are not doing well or get worse.     This information is not intended to replace advice given to you by your health care provider. Make sure you discuss any questions you have with your health care provider.     Document Released: 12/15/2001 Document Revised: 12/04/2013 Document Reviewed: 11/26/2012  [facility] Interactive Patient Education ©2016 [employer]  Septic Shock  Septic shock is the final, most serious stage of the body's inflammatory response to an infection (sepsis). Sepsis happens when the chemicals that are produced by your body to fight infection cause inflammation through your entire body (systemic). This can lead to problems with your blood pressure that prevent your organs from getting the oxygen they need. Septic shock results when your organs begin to fail from the drop in blood pressure.  Infections that lead to sepsis often begin in the lungs, abdomen, urinary tract, reproductive system, or digestive system.  CAUSES  Septic shock can result from any bacterial, fungal, or viral infection in the body. Septic shock from a viral infection is rare.  RISK FACTORS  You may be more likely to develop septic shock from an infection if you:  · Are very young or very old.  · Have AIDS or another disease that weakens your body's defense system (immune system).  · Have diabetes.  · Have lymphoma or leukemia.  · Have a disease of the lungs, intestines, reproductive system, or urinary tract.  · Have been hospitalized for a long time, especially if you are using a catheter.  · Had a recent surgery or medical procedure.  · Have been taking antibiotic medicines or steroid medicines for a long time.  SIGNS  AND SYMPTOMS  Signs and symptoms of septic shock may include:  · Low blood pressure.  · A rapid heart rate or heart palpitations.  · Shortness of breath.  · Rash or discolored skin.  · A very high or very low body temperature with chills.  · Reduced urine output.  · Feeling lightheaded, weak, or confused.  · Agitation or restlessness.  DIAGNOSIS  Your health care provider can diagnose septic shock based on your symptoms and your medical history. Your health care provider will also perform a physical exam. Tests that will be done to confirm the diagnosis may include:  · Tests to check for infection by trying to grow (culture) bacteria from samples of:  · Blood.  · Urine.  · Brain and spinal fluid (cerebrospinal fluid or CSF).  · Mucus.  · Wound secretions.  · Imaging tests, such as:  · X-rays.  · Ultrasound.  · CT scans.  · MRI.  TREATMENT  Septic shock is a medical emergency. Treatment takes place in a hospital, usually in the intensive care unit (ICU). You may be given antibiotics through an IV tube immediately. Other possible treatments include:  · Medicine to increase blood pressure (vasopressor medicines).  · Steroids to reduce inflammation.  · IV fluids to help with symptoms of dehydration.  · Respirators or breathing machines to support breathing.  · Insulin to control blood sugar.  · Surgery to clean wounds or remove infected or dead tissue. This is needed in some cases.  · Dialysis.  SEEK IMMEDIATE MEDICAL CARE IF:  Septic shock is a serious problem that is a medical emergency. Do not wait to see if the symptoms will go away. Get medical help right away. Call your local emergency services (911 in the [place] Do not drive yourself to the hospital.     This information is not intended to replace advice given to you by your health care provider. Make sure you discuss any questions you have with your health care provider.     Document Released: 08/21/2015 Document Reviewed: 08/21/2015  [employer]  Patient Education ©2016 [employer] Inc.      Your appointments     Jun 29, 2017  1:30 PM   Follow Up Visit with Yamilka Mcknight M.D.   [facility] (--)    [address] [[email or web address]  [place] [address] [phone]           You will be receiving a confirmation call a few days before your appointment from our automated call confirmation system.                 Discharge Medication Instructions:    Below are the medications your physician expects you to take upon discharge:    Review all your home medications and newly ordered medications with your doctor and/or pharmacist. Follow medication instructions as directed by your doctor and/or pharmacist.    Please keep your medication list with you and share with your physician.               Medication List      START taking these medications        Instructions    Morning Afternoon Evening Bedtime    cholestyramine 4 GM Pack   Last time this was given:  4 g on 6/5/2017  8:22 AM   Commonly known as:  QUESTRAN,PREVALITE   Next Dose Due:  Tonight        Take 1 Packet by mouth 2 Times a Day for 26 days.   Dose:  4 g                        omeprazole 20 MG delayed-release capsule   Last time this was given:  20 mg on 6/5/2017  8:22 AM   Commonly known as:  PRILOSEC   Next Dose Due:  Tomorrow        Take 1 Cap by mouth every day.   Dose:  20 mg                        phosphorus 155-852-130 MG tablet   Last time this was given:  1 Tab on 6/5/2017  5:49 AM   Commonly known as:  K-PHOS-NEUTRAL, PHOSPHA 250 NEUTRAL   Next Dose Due:  Dinner time        Doctor's comments:  Reassess with new labs   Take 1 Tab by mouth every 6 hours for 5 days.   Dose:  1 Tab                        sucralfate 1 GM/10ML Susp   Last time this was given:  1 g on 6/5/2017  5:49 AM   Commonly known as:  CARAFATE   Next Dose Due:  Dinner time        Take 10 mL by mouth every 6 hours.   Dose:  1 g                          CHANGE how you take these medications        Instructions    Morning  Afternoon Evening Bedtime    * ergocalciferol 19465 UNIT capsule   What changed:  Another medication with the same name was added. Make sure you understand how and when to take each.   Last time this was given:  50,000 Units on 6/1/2017  4:35 PM   Commonly known as:  DRISDOL   Next Dose Due:  Next dose 06/08/17        Take 1 cap once weekly for 12 weeks.                        * vitamin D (Ergocalciferol) 47938 UNITS Caps capsule   What changed:  You were already taking a medication with the same name, and this prescription was added. Make sure you understand how and when to take each.   Last time this was given:  50,000 Units on 6/1/2017  4:35 PM   Commonly known as:  DRISDOL        Take 1 Cap by mouth every 7 days for 55 days.   Dose:  75261 Units                        * predniSONE 5 MG Tabs   What changed:  Another medication with the same name was added. Make sure you understand how and when to take each.   Last time this was given:  15 mg on 6/5/2017  8:22 AM   Commonly known as:  DELTASONE   Next Dose Due:  Tomorrow        Take 15 mg by mouth every day.   Dose:  15 mg                        * predniSONE 5 MG Tabs   What changed:  You were already taking a medication with the same name, and this prescription was added. Make sure you understand how and when to take each.   Last time this was given:  15 mg on 6/5/2017  8:22 AM   Commonly known as:  DELTASONE        Doctor's comments:  Follow up with PCP.   Take 3 Tabs by mouth every day.   Dose:  15 mg                        * Notice:  This list has 4 medication(s) that are the same as other medications prescribed for you. Read the directions carefully, and ask your doctor or other care provider to review them with you.      CONTINUE taking these medications        Instructions    Morning Afternoon Evening Bedtime    Adalimumab 40 MG/0.8ML Pnkt        Inject 40 mg as instructed every 14 days.   Dose:  40 mg                        allopurinol 300 MG Tabs      Commonly known as:  ZYLOPRIM   Next Dose Due:  TOMORROW        Take 1 Tab by mouth every day.   Dose:  300 mg                        folic acid 1 MG Tabs   Commonly known as:  FOLVITE   Next Dose Due:  DINNER TIME        Take 2 tabs daily.                        gemfibrozil 600 MG Tabs   Commonly known as:  LOPID        Take 600 mg by mouth 2 times a day.   Dose:  600 mg                        hydroxychloroquine 200 MG Tabs   Commonly known as:  PLAQUENIL        Take 1 Tab by mouth 2 times a day.   Dose:  200 mg                        lisinopril 20 MG Tabs   Commonly known as:  PRINIVIL        Take 20 mg by mouth every day.   Dose:  20 mg                        METHOTREXATE PO        Take 1 Tab by mouth every day.   Dose:  1 Tab                        oxybutynin 5 MG Tabs   Commonly known as:  DITROPAN        Take 5 mg by mouth 2 Times a Day.   Dose:  5 mg                             Where to Get Your Medications      Information about where to get these medications is not yet available     ! Ask your nurse or doctor about these medications    - cholestyramine 4 GM Pack  - omeprazole 20 MG delayed-release capsule  - phosphorus [id number] MG tablet  - predniSONE 5 MG Tabs  - sucralfate 1 GM/10ML Susp  - vitamin D (Ergocalciferol) 54764 UNITS Caps capsule            Instructions           Diet / Nutrition:    Follow any diet instructions given to you by your doctor or the dietician, including how much salt (sodium) you are allowed each day.    If you are overweight, talk to your doctor about a weight reduction plan.    Activity:    Remain physically active following your doctor's instructions about exercise and activity.    Rest often.     Any time you become even a little tired or short of breath, SIT DOWN and rest.    Worsening Symptoms:    Report any of the following signs and symptoms to the doctor's office immediately:    *Pain of jaw, arm, or neck  *Chest pain not relieved by medication                                *Dizziness or loss of consciousness  *Difficulty breathing even when at rest   *More tired than usual                                       *Bleeding drainage or swelling of surgical site  *Swelling of feet, ankles, legs or stomach                 *Fever (>100ºF)  *Pink or blood tinged sputum  *Weight gain (3lbs/day or 5lbs /week)           *Shock from internal defibrillator (if applicable)  *Palpitations or irregular heartbeats                *Cool and/or numb extremities    Stroke Awareness    Common Risk Factors for Stroke include:    Age  Atrial Fibrillation  Carotid Artery Stenosis  Diabetes Mellitus  Excessive alcohol consumption  High blood pressure  Overweight   Physical inactivity  Smoking    Warning signs and symptoms of a stroke include:    *Sudden numbness or weakness of the face, arm or leg (especially on one side of the body).  *Sudden confusion, trouble speaking or understanding.  *Sudden trouble seeing in one or both eyes.  *Sudden trouble walking, dizziness, loss of balance or coordination.Sudden severe headache with no known cause.    It is very important to get treatment quickly when a stroke occurs. If you experience any of the above warning signs, call 911 immediately.                   Disclaimer         Quit Smoking / Tobacco Use:    I understand the use of any tobacco products increases my chance of suffering from future heart disease or stroke and could cause other illnesses which may shorten my life. Quitting the use of tobacco products is the single most important thing I can do to improve my health. For further information on smoking / tobacco cessation call a Toll Free Quit Line at 1-599.198.1395 (*National Cancer [place]) or 1-502.786.8127 (American Lung Association) or you can access the web based program at www.lungusa.org.    [place] Tobacco Users Help Line:  (380) 243-8076       Toll Free: 1-750.925.2444  Quit Tobacco Program [facility]  (707) 213-1718    Crisis Hotline:    [place] Crisis Hotline:  3-954-ZWUXXLI or 1-423.657.2015    [place] Crisis Hotline:    1-930.259.4247 or 973-116-5857    Discharge Survey:   Thank you for choosing [facility]. We hope we did everything we could to make your hospital stay a pleasant one. You may be receiving a phone survey and we would appreciate your time and participation in answering the questions. Your input is very valuable to us in our efforts to improve our service to our patients and their families.        My signature on this form indicates that:    1. I have reviewed and understand the above information.  2. My questions regarding this information have been answered to my satisfaction.  3. I have formulated a plan with my discharge nurse to obtain my prescribed medications for home.                  Disclaimer         __________________________________                     __________       ________                       Patient Signature                                                 Date                    Time

## 2017-05-27 NOTE — PROCEDURES
Time: 8.00 am    Indication: Hemodynamic monitoring/Intravenous access  Resident: Rosemary Scherer  Attending: Dr Quevedo      A time-out was completed verifying correct patient, procedure, site, positioning, and special equipment if applicable. The patient was placed in a dependent position appropriate for central line placement based on the vein to be cannulated. The patient’s right neck was prepped and draped in sterile fashion. 1% Lidocaine was used to anesthetize the surrounding skin area. A triple lumen 9-Bel[place]an Cordis catheter was introduced into the the internal jugular using the Seldinger technique and under ultrasound guidance. The catheter was threaded smoothly over the guide wire and appropriate blood return was obtained. Each lumen of the catheter was evacuated of air and flushed with sterile saline. The catheter was then sutured in place to the skin and a sterile dressing applied. Perfusion to the extremity distal to the point of catheter insertion was checked and found to be adequate. Dr Quevedo  was present for the entire procedure.    Estimated Blood Loss: less than 5 ml  The patient tolerated the procedure well and there were no complications.

## 2017-05-27 NOTE — ED NOTES
Report received. Pt with levophed infusing PIV, pta. ERP aware need of central line. Pt still requiring levophed for BP.

## 2017-05-27 NOTE — DISCHARGE PLANNING
Medical Social Work: Critical Patient  SW notified of Critical Pt, transfer from [facility] by [facility]. Pt name is Brandi Leggett [birth year]. She requested her spouse be contacted his name is Gabriela Goyal phone number [phone]. SW left a message for spouse to call. Pt states he knows she is in hospital but unaware she was transferred to [facility] from [place]. Pt. States  is home with 2 broken legs. SW will continue to monitor .

## 2017-05-27 NOTE — CARE PLAN
Problem: Pain Management  Goal: Pain level will decrease to patient’s comfort goal  Pt has history of chronic pain, states she has been on pain pills in the past but has been unable to get refills d/t car & financial issues.  Dr Armendariz ordered IV fentanyl and PO oxycodone.  Pt having acute abdominal pain upon admission.    Problem: Hemodynamic Status  Goal: Vital Signs and Fluid Balance Management  Pt has received over 5L of IVF per ER RN upon arriving to RICU.  Pt was on levophed gtt at 3mcg/min upon arrival to RICU, it was stopped shortly thereafter an pt has maintained SBPs in the 110s-120s & CVP = 7-9.     Problem: Nutrition Deficit  Goal: Adequate Food and Fluid Intake  Pt currently NPO with ice chips per Dr Ibrahim.  Pt told staff she has lost about 60lbs since Dec 2016.

## 2017-05-27 NOTE — PROGRESS NOTES
Pt admitted to R-105 via gurney from ER Red-1 at ~1050.  Pt is A&Ox4 and has complaint of abdominal pain and nausea (MD notified and to put in orders).  Pt has bicarb gtt going at 150ml/hr but discontinued on MAR, clarified with Dr Armendariz - MD wants pt to receive entire bag of bicarb so will continue to infuse. Skin intact, pt has bag of belongings including clothing and purse (refusing safekeeping at this time) and bag of prescription medications (will take to pharmacy).  Levophed gtt stopped as SBP in the 120s.  Skin assessment complete and is intact except for slight rash in perineal/groin area.

## 2017-05-27 NOTE — IP AVS SNAPSHOT
Socialbakers Access Code: [email or web address]  Expires: 7/5/2017 11:10 AM    Your email address is not on file at [facility].  Email Addresses are required for you to sign up for Socialbakers, please contact [phone] to verify your personal information and to provide your email address prior to attempting to register for Socialbakers.    [place] [address]    Socialbakers  A secure, online tool to manage your health information     [facility]’s Socialbakers® is a secure, online tool that connects you to your personalized health information from the privacy of your home -- day or night - making it very easy for you to manage your healthcare. Once the activation process is completed, you can even access your medical information using the Socialbakers aniket, which is available for free in the Apple Aniket store or Google Play store.     To learn more about Socialbakers, visit [email or web address]    There are two levels of access available (as shown below):   My Chart Features  [facility] Primary Care Doctor [facility]  Specialists [facility]  Urgent  Care Non-[facility] Primary Care Doctor   Email your healthcare team securely and privately 24/7 X X X    Manage appointments: schedule your next appointment; view details of past/upcoming appointments X      Request prescription refills. X      View recent personal medical records, including lab and immunizations X X X X   View health record, including health history, allergies, medications X X X X   Read reports about your outpatient visits, procedures, consult and ER notes X X X X   See your discharge summary, which is a recap of your hospital and/or ER visit that includes your diagnosis, lab results, and care plan X X  X     How to register for Socialbakers:  Once your e-mail address has been verified, follow the following steps to sign up for Socialbakers.     1. Go to  [email or web address]  2. Click on the Sign Up Now box, which takes you to the New Member Sign Up page. You will  need to provide the following information:  a. Enter your [employer] Access Code exactly as it appears at the top of this page. (You will not need to use this code after you’ve completed the sign-up process. If you do not sign up before the expiration date, you must request a new code.)   b. Enter your date of birth.   c. Enter your home email address.   d. Click Submit, and follow the next screen’s instructions.  3. Create a Doculynxt ID. This will be your [employer] login ID and cannot be changed, so think of one that is secure and easy to remember.  4. Create a [employer] password. You can change your password at any time.  5. Enter your Password Reset Question and Answer. This can be used at a later time if you forget your password.   6. Enter your e-mail address. This allows you to receive e-mail notifications when new information is available in [employer].  7. Click Sign Up. You can now view your health information.    For assistance activating your [employer] account, call [phone]

## 2017-05-27 NOTE — ED NOTES
BIB EMS    Chief Complaint   Patient presents with   • Blood Infection     Pt went to [facility] after not feeling well, pt lactic 6.3. pt became hypotenisve. Levo started. pt transfer from [place],     PTA given 4.5 grams zosyn, 3 Liters NS, and started on LEVO at 10 mcg    Pt in gown, on monitor, chart up for ERP

## 2017-05-27 NOTE — ED PROVIDER NOTES
ED Provider Note    CHIEF COMPLAINT  Chief Complaint   Patient presents with   • Blood Infection     Pt went to [facility] after not feeling well, pt lactic 6.3. pt became hypotenisve. Levo started. pt transfer from [place],       [facility]  Brandi Leggett is a 51 y.o. female who presents with nausea and vomiting, for the last 3 days, last vomiting last night. Diarrhea for 3 days, last diarrhea, last night. No fever no chills. Also complaining of right upper quadrant pain, severe dull gradual onset radiates to her back for the last 4 days.  Evidently she was seen at another hospital last night transferred here    Is also complaining of chest pain for the last day, mid lower chest, moderate dull with radiation to her back. No shortness of breath. No fever no chills. No diaphoresis.  REVIEW OF SYSTEMS  See [facility] for further details. History of hypertension and lupus or rheumatoid arthritis, chronic cervical disc disease, no history of renal failure.Denies other G.I., G.U.. endrocine, cardiovascular, respriatory or neurological problems.    All other systems are negative.     PAST MEDICAL HISTORY  Past Medical History   Diagnosis Date   • Lupus (CMS-HCC)    • Fibroids    • SLE (systemic lupus erythematosus) (CMS-HCC)    • Psoriatic arthritis (CMS-HCC)    • Pancreatitis    • Arthritis        FAMILY HISTORY  Family History   Problem Relation Age of Onset   • Diabetes Mother    • Hypertension Mother    • Cancer Mother    • Diabetes Father    • Cancer Father        SOCIAL HISTORY  Social History     Social History   • Marital Status:      Spouse Name: N/A   • Number of Children: N/A   • Years of Education: N/A     Social History Main Topics   • Smoking status: Never Smoker    • Smokeless tobacco: None   • Alcohol Use: No   • Drug Use: No   • Sexual Activity: Not Asked     Other Topics Concern   • None     Social History Narrative       SURGICAL HISTORY  Past Surgical History   Procedure Laterality Date   • Gyn surgery   "8/2013     Hysteroscopy, D and C       CURRENT MEDICATIONS  Home Medications     **Home medications have not yet been reviewed for this encounter**          ALLERGIES  Allergies   Allergen Reactions   • Codeine        PHYSICAL EXAM  VITAL SIGNS: /64 mmHg  Pulse 107  Temp(Src) 36.1 °C (97 °F)  Resp 16  Ht 1.6 m (5' 3\")  Wt 72.576 kg (160 lb)  BMI 28.35 kg/m2  SpO2 100%  Constitutional: Well developed, Well nourished, No acute distress, Non-toxic appearance.   HENT: Normocephalic, Atraumatic, Bilateral external ears normal, Oropharynx moist, No oral exudates, Nose normal.   Eyes: PERRL, EOMI, Conjunctiva normal, No discharge.   Neck: Normal range of motion, No tenderness, Supple, No stridor.   Lymphatic: No lymphadenopathy noted.   Cardiovascular: Normal heart rate, Normal rhythm, No murmurs, No rubs, No gallops.   Thorax & Lungs: Normal breath sounds, No respiratory distress, No wheezing, No chest tenderness.   Abdomen:  No tenderness, no guarding no rigidity and the abdomen is soft.  No masses, No pulsatile masses.  Skin: Warm, Dry, No erythema, No rash.   Back: No tenderness, No CVA tenderness.   Extremities: Intact distal pulses, No edema, No tenderness, No cyanosis, No clubbing.   Musculoskeletal: Good range of motion in all major joints. No tenderness to palpation or major deformities noted.   Neurologic: Alert & oriented x 3, Normal motor function, Normal sensory function, No focal deficits noted.   Psychiatric: Affect normal, Judgment normal, Mood normal.   EKG Interpretation    Interpreted by me    Rhythm: Sinus tachycardia     Rate: 120     Axis: normal  Ectopy: none  Conduction: normal  ST Segments: no acute change  T Waves: no acute change  Q Waves: none    Clinical Impression: I do not have an old EKG to compare to      RADIOLOGY/PROCEDURES  CAT scan of her abdomen done her previous hospital revealed an enlarged fatty liver, small left renal cyst.  Chest x-ray from previous hospital, no acute " cardiopulmonary disease.      COURSE & MEDICAL DECISION MAKING  Pertinent Labs & Imaging studies reviewed. (See chart for details)  Lab from previous hospital, white count 15.5. Hematocrit normal platelet count normal.3 polys. Her BUN, elevated 101 creatinine elevated to 7.39. Electrolytes, sodium, low 125 potassium elevated 5.8 lipase 208 bilirubin normal blood gas pH, 7.34 CO2 16.6. Base excess -14    She presented to another hospital [facility] with abdominal pain chest pain, hypotension. Long history of rheumatoid arthritis, history of lupus. She was worked up there, noted to have elevated lactate level. Given Zosyn, transferred here because her renal failure. No history of renal failure in the past. I will talk with [facility] about admission to ICU. Also I will speak with nephrology about her new onset renal failure which she arrived here with pressors, blood pressure 120. Patient was given 3 L of normal saline previous hospital, continuing with fluids here.  FINAL IMPRESSION  1.   1. Right upper quadrant abdominal pain    2. Non-intractable vomiting with nausea, unspecified vomiting type    3. Diarrhea, unspecified type    4. Hypotension, unspecified hypotension type    5. Hyponatremia    6. Hyperkalemia        2.   3.     Disposition  Procedure note: This patient has lactic acidosis, hypotensive, on pressors. We felt it was necessary that she had a central line placed.  Understands indications and complications of a central line.. The right neck, over the right internal jugular vein was prepped with antiseptic. Right neck was then draped with sterile dressings. Using sterile procedure Area over the right internal jugular vein was then anesthetized using ultrasound guidance.  Next the right internal jugular vein was cannulized using ultrasound guidance. A guidewire was placed in the right internal jugular vein. Small neck was made in the skin over the guidewire. Dilator was used to dilate the pain.  After dilation of the pain a triple-lumen catheter was placed over the guidewire guidewire was removed. Good blood flow through the ports in the triple-lumen catheter. Triple-lumen catheter was then sutured to the skin.. This procedure was done by [facility] resident under my direct supervision.. I observed this entire process being done and it was done under my supervision.    Critical care time spent with this patient, excluding procedure, 40 minutes.  Electronically signed by: Ehsan Quevedo, 5/27/2017 7:07 AM

## 2017-05-27 NOTE — PROGRESS NOTES
Date of Service 5/27/2017    [facility] Gold Team Attending Note  (see full Resident note dictated in EPIC)    Assessment:  - Septic Shock - abdominal source, lactate 6, renee, on pressors, immunocompromised  - Abdominal Pain - ct abdo osh (-), exam w ttp rlq/ruq w + rebound, no sig guarding. US with enlarged GG w/o stones. 3 days n/v/d  - RENEE - in setting of sepsis, last cr 0.67 in 3/30/2017, now 6  - AGMA - lactic acidosis + renee  - Immunocompromised Host - chronic steroids, humira, methotrexate, autoimmune disease  - Lupus  - Leukocytosis  - Hyponatremia  - Elevated liver enzymes  - Hx of pancreatitis  - Recent C Diff Colitis    Plan:  - sepsis protocol  - frank protocol with vit c/thiamine/hydrocortisone  - central line in place  - empiric broad spectrum abx   - f/u c diff   - stress dose steroids  - aggressive iv hydration  - urine studies  - surgery eval of abdo with early signs of peritonitis   - npo for now      I have seen and examined the patient today.  I have reviewed the medical record, laboratory data, imaging and all relevant studies.  I have discussed the plan of care with the Internal Medicine Resident and agree with the note and plan as documented.         Total critical care time, not including billable procedures 45 minutes.

## 2017-05-27 NOTE — ED NOTES
from Lab called with critical result of lactic 5.9 at 0649. Critical lab result read back to .   Dr. Quevedo notified of critical lab result at 0650.  Critical lab result read back by Dr. Quevedo.

## 2017-05-27 NOTE — H&P
"       [facility] Medicine Admitting History and Physical    Name Brandi Leggett     1965   Age/Sex 51 y.o. female   MRN [id number]   Code Status Full code      After 5PM or if no immediate response to page, please call for cross-coverage  Attending/Team: MARCOS Armendariz  Call [phone] to page   1st Call - Day Intern (R1):   Dr Scherer  2nd Call - Day Sr. Resident (R2/R3):   Dr Scherer        Chief Complaint:  Abdominal pain, diarrhea, nausea, vomiting     HPI:  Ms Leggett is a 51 yrs old female with PMH of SLE, rheumatoid arthritis, recurrent pancreatitis, alcohol abuse (quit 3/2017), was transferred from [facility] for septic shock, with lactic acid at 6. She was on levophed through peripheral IV line, s/p empiric antibiotics, IVF.   Patient complains of abdominal pain, nausea, vomiting, loose watery stools for the last 3 days, she was unable to keep anything down. She ran out of medications for the last 2 weeks, however states that she took 15 mg prednisone (home dose) yesterday morning.    Denies blood in stool, urine, chest pain, palpitations, focal weakness.   States that she was diagnosed with C diff on 2017.       Review of Systems   Constitutional: Positive for fever, chills, weight loss, malaise/fatigue and diaphoresis.        Lost 50 pounds since her father passed away 3/2017    HENT: Negative for congestion and sore throat.    Eyes: Negative for blurred vision.   Respiratory: Positive for cough and shortness of breath. Negative for hemoptysis, sputum production and wheezing.    Cardiovascular: Negative for chest pain, palpitations and leg swelling.   Gastrointestinal: Positive for heartburn, nausea, vomiting, abdominal pain, diarrhea and melena. Negative for blood in stool.        \"black watery stools\"   Genitourinary: Positive for dysuria. Negative for hematuria and flank pain.   Musculoskeletal: Positive for myalgias. Negative for falls.   Skin: Negative for rash. "   Neurological: Negative for dizziness, seizures, loss of consciousness, weakness and headaches.   Psychiatric/Behavioral: Negative for substance abuse. The patient is nervous/anxious.              Past Medical History:   Past Medical History   Diagnosis Date   • Lupus (CMS-[facility])    • Fibroids    • SLE (systemic lupus erythematosus) (CMS-[facility])    • Psoriatic arthritis (CMS-HCC)    • Pancreatitis    • Arthritis        Past Surgical History:  Past Surgical History   Procedure Laterality Date   • Gyn surgery  8/2013     Hysteroscopy, D and C       Current Outpatient Medications:  Home Medications     Reviewed by Modesta Mirza, Pharmacy Int (Pharmacy Intern) on 05/27/17 at 0949  Med List Status: Complete    Medication Last Dose Status    Adalimumab 40 MG/0.8ML Pen-injector Kit 5/26/2017 Active    allopurinol (ZYLOPRIM) 300 MG Tab 5/26/2017 Active    ergocalciferol (DRISDOL) 09721 UNIT capsule 5/26/2017 Active    folic acid (FOLVITE) 1 MG Tab 5/26/2017 Active    gemfibrozil (LOPID) 600 MG Tab 5/26/2017 Active    hydroxychloroquine (PLAQUENIL) 200 MG Tab 5/26/2017 Active    lisinopril (PRINIVIL) 20 MG Tab 5/26/2017 Active    Methotrexate Sodium (METHOTREXATE PO) 5/26/2017 Active    oxybutynin (DITROPAN) 5 MG TABS 5/26/2017 Active    predniSONE (DELTASONE) 5 MG Tab 5/26/2017 Active                Medication Allergy/Sensitivities:  Allergies   Allergen Reactions   • Codeine          Family History:  Family History   Problem Relation Age of Onset   • Diabetes Mother    • Hypertension Mother    • Cancer Mother    • Diabetes Father    • Cancer Father        Social History:  Social History     Social History   • Marital Status:      Spouse Name: N/A   • Number of Children: N/A   • Years of Education: N/A     Occupational History   • Not on file.     Social History Main Topics   • Smoking status: Never Smoker    • Smokeless tobacco: Not on file   • Alcohol Use: No   • Drug Use: No   • Sexual Activity: Not on file  "    Other Topics Concern   • Not on file     Social History Narrative     Living situation: lives with her    PCP : in [place]       Physical Exam     Filed Vitals:    05/27/17 1330 05/27/17 1345 05/27/17 1400 05/27/17 1500   BP:       Pulse: 118 116 111 113   Temp:   37.2 °C (98.9 °F)    Resp:   12 25   Height:       Weight:       SpO2:   98% 97%     Body mass index is 31.02 kg/(m^2).  /64 mmHg  Pulse 113  Temp(Src) 37.2 °C (98.9 °F)  Resp 25  Ht 1.6 m (5' 3\")  Wt 79.4 kg (175 lb 0.7 oz)  BMI 31.02 kg/m2  SpO2 97%  Breastfeeding? No  O2 therapy: Pulse Oximetry: 97 %, O2 (LPM): 2, O2 Delivery: Silicone Nasal Cannula    Physical Exam   Constitutional: She is oriented to person, place, and time. She appears distressed.   Weak looking female, sitting on bed, distressed    HENT:   Head: Normocephalic and atraumatic.   Mouth/Throat: No oropharyngeal exudate.   Erythematous mucosa, no bleeding noted    Eyes: Pupils are equal, round, and reactive to light. No scleral icterus.   Neck: Normal range of motion. Neck supple. No JVD present.   Cardiovascular:   Murmur heard.  Tachycardia, systolic murmur over right second IC space    Pulmonary/Chest: No respiratory distress. She has no wheezes.   Diffusely decreased breath sounds bilaterally    Abdominal: Soft. She exhibits no distension. There is tenderness. There is no rebound.   Hypoactive bowel sounds   Abdominal tenderness on deep palpation over right upper quadrant   No rebound    Genitourinary: Guaiac positive stool.   Musculoskeletal: Normal range of motion. She exhibits no edema.   Neurological: She is alert and oriented to person, place, and time. GCS score is 15.   Skin: Skin is warm. She is not diaphoretic.   Psychiatric: Affect normal.             Data Review       Old Records Request:   Completed  Current Records review and summary: Completed    Lab Data Review:  Recent Results (from the past 24 hour(s))   Lactic acid (lactate)    Collection " Time: 05/27/17  6:23 AM   Result Value Ref Range    Lactic Acid 5.9 (HH) 0.5 - 2.0 mmol/L   CBC WITH DIFFERENTIAL    Collection Time: 05/27/17  6:23 AM   Result Value Ref Range    WBC 16.9 (H) 4.8 - 10.8 K/uL    RBC 3.82 (L) 4.20 - 5.40 M/uL    Hemoglobin 11.2 (L) 12.0 - 16.0 g/dL    Hematocrit 34.0 (L) 37.0 - 47.0 %    MCV 89.0 81.4 - 97.8 fL    MCH 29.3 27.0 - 33.0 pg    MCHC 32.9 (L) 33.6 - 35.0 g/dL    RDW 43.3 35.9 - 50.0 fL    Platelet Count 309 164 - 446 K/uL    MPV 10.3 9.0 - 12.9 fL    Neutrophils-Polys 85.10 (H) 44.00 - 72.00 %    Lymphocytes 9.60 (L) 22.00 - 41.00 %    Monocytes 4.50 0.00 - 13.40 %    Eosinophils 0.00 0.00 - 6.90 %    Basophils 0.50 0.00 - 1.80 %    Immature Granulocytes 0.30 0.00 - 0.90 %    Nucleated RBC 0.00 /100 WBC    Neutrophils (Absolute) 14.42 (H) 2.00 - 7.15 K/uL    Lymphs (Absolute) 1.62 1.00 - 4.80 K/uL    Monos (Absolute) 0.76 0.00 - 0.85 K/uL    Eos (Absolute) 0.00 0.00 - 0.51 K/uL    Baso (Absolute) 0.09 0.00 - 0.12 K/uL    Immature Granulocytes (abs) 0.05 0.00 - 0.11 K/uL    NRBC (Absolute) 0.00 K/uL   COMP METABOLIC PANEL    Collection Time: 05/27/17  6:23 AM   Result Value Ref Range    Sodium 129 (L) 135 - 145 mmol/L    Potassium 4.9 3.6 - 5.5 mmol/L    Chloride 95 (L) 96 - 112 mmol/L    Co2 9 (LL) 20 - 33 mmol/L    Anion Gap 25.0 (H) 0.0 - 11.9    Glucose 114 (H) 65 - 99 mg/dL    Bun 95 (HH) 8 - 22 mg/dL    Creatinine 5.92 (HH) 0.50 - 1.40 mg/dL    Calcium 7.4 (L) 8.5 - 10.5 mg/dL    AST(SGOT) 68 (H) 12 - 45 U/L    ALT(SGPT) 47 2 - 50 U/L    Alkaline Phosphatase 194 (H) 30 - 99 U/L    Total Bilirubin 0.7 0.1 - 1.5 mg/dL    Albumin 3.1 (L) 3.2 - 4.9 g/dL    Total Protein 5.7 (L) 6.0 - 8.2 g/dL    Globulin 2.6 1.9 - 3.5 g/dL    A-G Ratio 1.2 g/dL   ESTIMATED GFR    Collection Time: 05/27/17  6:23 AM   Result Value Ref Range    GFR If  9 (A) >60 mL/min/1.73 m 2    GFR If Non  8 (A) >60 mL/min/1.73 m 2   URINALYSIS    Collection Time: 05/27/17   6:26 AM   Result Value Ref Range    Micro Urine Req Microscopic     Color Straw     Character Clear     Specific Gravity 1.005 <1.035    Ph 5.0 5.0-8.0    Glucose Trace (A) Negative mg/dL    Ketones Negative Negative mg/dL    Protein Negative Negative mg/dL    Bilirubin Negative Negative    Nitrite Negative Negative    Leukocyte Esterase Negative Negative    Occult Blood Small (A) Negative   URINE MICROSCOPIC (W/UA)    Collection Time: 17  6:26 AM   Result Value Ref Range    WBC 0-2 /hpf    RBC 2-5 (A) /hpf    Bacteria Few (A) None /hpf    Epithelial Cells Few /hpf    Hyaline Cast 3-5 (A) /lpf   EKG (ER)    Collection Time: 17  8:38 AM   Result Value Ref Range    Report       [facility] Emergency Dept.    Test Date:  2017  Pt Name:    WILLIAM ROWE               Department: ER  MRN:        [id number]                      Room:       [facility]  Gender:     F                            Technician: [id number]  :        [id number]                   Requested By:GABRIEL WONG  Order #:    [id number]                    Reading MD: GABRIEL WONG MD    Measurements  Intervals                                Axis  Rate:       111                          P:          58  VT:         136                          QRS:        -5  QRSD:       76                           T:          67  QT:         368  QTc:        500    Interpretive Statements  SINUS TACHYCARDIA  BORDERLINE INFERIOR Q WAVES  BORDERLINE PROLONGED QT INTERVAL  Compared to ECG 2014 00:30:55  No significant changes    Electronically Signed On 2017 13:21:55 PDT by GABRIEL WONG MD     BLOOD CULTURE,HOLD    Collection Time: 17  9:30 AM   Result Value Ref Range    Blood Culture Hold Collected    Lactic Acid -STAT Once    Collection Time: 17  9:49 AM   Result Value Ref Range    Lactic Acid 1.9 0.5 - 2.0 mmol/L   BLOOD CULTURE,HOLD    Collection Time: 17  9:49 AM   Result Value Ref Range    Blood  Culture Hold Collected    CREATINE KINASE    Collection Time: 05/27/17  9:49 AM   Result Value Ref Range    CPK Total 145 0 - 154 U/L   LIPASE    Collection Time: 05/27/17  9:49 AM   Result Value Ref Range    Lipase 49 11 - 82 U/L   Arterial blood gas    Collection Time: 05/27/17 10:06 AM   Result Value Ref Range    Ph 7.36 (L) 7.40 - 7.50    Pco2 26.2 26.0 - 37.0 mmHg    Po2 105.7 (H) 64.0 - 87.0 mmHg    O2 Saturation 96.5 93.0 - 99.0 %    Hco3 15 (L) 17 - 25 mmol/L    Base Excess -9 (L) -4 - 3 mmol/L    Body Temp 36.7 Centigrade    O2 Therapy 2.0 2.0 - 10.0 L/min    Ph -TC 7.37 (L) 7.40 - 7.50    Pco2 -TC 25.9 (L) 26.0 - 37.0 mmHg    Po2 -.9 (H) 64.0 - 87.0 mmHg   CBC WITH DIFFERENTIAL    Collection Time: 05/27/17  1:30 PM   Result Value Ref Range    WBC 11.4 (H) 4.8 - 10.8 K/uL    RBC 3.45 (L) 4.20 - 5.40 M/uL    Hemoglobin 10.2 (L) 12.0 - 16.0 g/dL    Hematocrit 30.4 (L) 37.0 - 47.0 %    MCV 88.1 81.4 - 97.8 fL    MCH 29.6 27.0 - 33.0 pg    MCHC 33.6 33.6 - 35.0 g/dL    RDW 42.3 35.9 - 50.0 fL    Platelet Count 175 164 - 446 K/uL    MPV 10.2 9.0 - 12.9 fL    Neutrophils-Polys 75.30 (H) 44.00 - 72.00 %    Lymphocytes 16.20 (L) 22.00 - 41.00 %    Monocytes 7.10 0.00 - 13.40 %    Eosinophils 0.60 0.00 - 6.90 %    Basophils 0.50 0.00 - 1.80 %    Immature Granulocytes 0.30 0.00 - 0.90 %    Nucleated RBC 0.00 /100 WBC    Neutrophils (Absolute) 8.56 (H) 2.00 - 7.15 K/uL    Lymphs (Absolute) 1.84 1.00 - 4.80 K/uL    Monos (Absolute) 0.81 0.00 - 0.85 K/uL    Eos (Absolute) 0.07 0.00 - 0.51 K/uL    Baso (Absolute) 0.06 0.00 - 0.12 K/uL    Immature Granulocytes (abs) 0.03 0.00 - 0.11 K/uL    NRBC (Absolute) 0.00 K/uL   COMP METABOLIC PANEL    Collection Time: 05/27/17  1:30 PM   Result Value Ref Range    Sodium 131 (L) 135 - 145 mmol/L    Potassium 3.9 3.6 - 5.5 mmol/L    Chloride 96 96 - 112 mmol/L    Co2 17 (L) 20 - 33 mmol/L    Anion Gap 18.0 (H) 0.0 - 11.9    Glucose 91 65 - 99 mg/dL    Bun 79 (HH) 8 - 22 mg/dL     Creatinine 4.96 (H) 0.50 - 1.40 mg/dL    Calcium 6.6 (LL) 8.5 - 10.5 mg/dL    AST(SGOT) 53 (H) 12 - 45 U/L    ALT(SGPT) 42 2 - 50 U/L    Alkaline Phosphatase 184 (H) 30 - 99 U/L    Total Bilirubin 0.7 0.1 - 1.5 mg/dL    Albumin 2.8 (L) 3.2 - 4.9 g/dL    Total Protein 5.2 (L) 6.0 - 8.2 g/dL    Globulin 2.4 1.9 - 3.5 g/dL    A-G Ratio 1.2 g/dL   PHOSPHORUS    Collection Time: 05/27/17  1:30 PM   Result Value Ref Range    Phosphorus 6.5 (H) 2.5 - 4.5 mg/dL   LACTIC ACID    Collection Time: 05/27/17  1:30 PM   Result Value Ref Range    Lactic Acid 1.3 0.5 - 2.0 mmol/L   PROTHROMBIN TIME    Collection Time: 05/27/17  1:30 PM   Result Value Ref Range    PT 14.3 12.0 - 14.6 sec    INR 1.08 0.87 - 1.13   APTT    Collection Time: 05/27/17  1:30 PM   Result Value Ref Range    APTT 30.3 24.7 - 36.0 sec   ESTIMATED GFR    Collection Time: 05/27/17  1:30 PM   Result Value Ref Range    GFR If  11 (A) >60 mL/min/1.73 m 2    GFR If Non African American 9 (A) >60 mL/min/1.73 m 2   CORTISOL    Collection Time: 05/27/17  1:35 PM   Result Value Ref Range    Cortisol 10.1 0.0 - 23.0 ug/dL   URINE SODIUM RANDOM    Collection Time: 05/27/17  1:40 PM   Result Value Ref Range    Sodium, Urine -per volume 113 mmol/L   URINE CREATININE RANDOM    Collection Time: 05/27/17  1:40 PM   Result Value Ref Range    Creatinine, Random Urine 31.00 mg/dL   URINE DRUG SCREEN    Collection Time: 05/27/17  1:40 PM   Result Value Ref Range    Amphetamines Urine Negative Negative    Barbiturates Negative Negative    Benzodiazepines Negative Negative    Cocaine Metabolite Negative Negative    Methadone Negative Negative    Ecstasy Negative Negative    Opiates Positive (A) Negative    Oxycodone Negative Negative    Phencyclidine -Pcp Negative Negative    Propoxyphene Negative Negative    Cannabinoid Metab Negative Negative       Imaging/Procedures Review:    ndependant Imaging Review: Completed  US-ABDOMEN COMPLETE SURVEY   Final Result     "  1.  Distended gallbladder. No evidence of gallstone or biliary ductal dilatation.      2.  Enlarged echogenic liver consistent with fatty change versus hepatocellular dysfunction.      DX-CHEST-PORTABLE (1 VIEW)   Final Result      Interval placement of a right IJ central line with the catheter tip overlying the cavoatrial junction.      OUTSIDE IMAGES-CT ABDOMEN /PELVIS   Preliminary Result      OUTSIDE IMAGES-DX CHEST   Preliminary Result          EKG:   EKG Independant Review: Completed  QTc:500 , HR: 111, sinus tachycardia     (X) Records reviewed and summarized in current documentation             Assessment/Plan     Septic shock from unknown primary source with multiorgan failure   Severe Metabolic acidosis    Abdominal pain, nausea, vomiting  H/o C diff colitis (last episode 1/2017)   H/o recurrent pancreatitis   Hypovolemic hyponatremia   - presented with abdominal pain with nausea, vomiting, watery loose stools for 3 days   - h/o C-diff colitis 1/2017   - CT abd/pelvis at outside facility without contrast negative for intraabdominal pathology   - USG shows \"distended gall bladder, No evidence of gallstone or biliary ductal dilatation, Enlarged echogenic liver consistent with fatty change versus hepatocellular dysfunction\".   PLAN  - sepsis protocol  - vitamin C, thiamine, solu cortef   - on levophed for MAP > 65   - empiric zosyn, vanco  - on oral vanco for h/o C diff   - Dr Ibrahim, surgery consulted for possible early peritonitis   - trend lactic acid      Non oliguric acute kidney injury   Severe anion gap metabolic acidosis   - has h/o lupus however never diagnosed with lupus nephritis   - presented with bicarb at 9, 95, 5.92   - ERP consulted Nephrology   - on llanos catheter   - on bicarb drip   - FeNa consistent with intrinsic renal failure   - USG renal within normal limits   - will continue to monitor with IVF     Rheumatoid arthritis   SLE  Tophaceous gout   - home dose on methotrexate, humira, " prednisone   - chronically immunocompromised    H/o alcohol abuse (quit 3/2017)  Hepatic steatosis   Elevated transaminase   - Maddrey score 11.3   - quit alcohol since her father passed away on 3/2017     Normocytic anemia     Cervical DJD   - evaluated by Neurosurgery awaiting surgery     Hyperlipidemia   - held home gemfibrozil, pravastatin     H/o hematuria   - ? Due to methotrexate use   - followed by Urology     Anticipated Hospital stay:  >2 midnights        Quality Measures  EKG reviewed, Labs reviewed, Medications reviewed and Radiology images reviewed  Kovacs catheter: Critically Ill - Requiring Accurate Measurement of Urinary Output      DVT Prophylaxis: Heparin  DVT prophylaxis - mechanical: SCDs  Ulcer prophylaxis: Yes  Antibiotics: Treating active infection/contamination beyond 24 hours perioperative coverage

## 2017-05-27 NOTE — IP AVS SNAPSHOT
6/5/2017    Brandi Leggett  [address] [place] [address] [place] [address]    Dear Brandi:    [facility] wants to ensure your discharge home is safe and you or your loved ones have had all of your questions answered regarding your care after you leave the hospital.    Below is a list of resources and contact information should you have any questions regarding your hospital stay, follow-up instructions, or active medical symptoms.    Questions or Concerns Regarding… Contact   Medical Questions Related to Your Discharge  (7 days a week, 8am-5pm) Contact a Nurse Care Coordinator   [phone]   Medical Questions Not Related to Your Discharge  (24 hours a day / 7 days a week)  Contact the Nurse Health Line   [phone]    Medications or Discharge Instructions Refer to your discharge packet   or contact your [facility] Primary Care Provider   [phone]   Follow-up Appointment(s) Schedule your appointment via [email or web address]   or contact Scheduling [phone]   Billing Review your statement via [email or web address]  or contact Billing [phone]   Medical Records Review your records via [email or web address]   or contact Medical Records [phone]     You may receive a telephone call within two days of discharge. This call is to make certain you understand your discharge instructions and have the opportunity to have any questions answered. You can also easily access your medical information, test results and upcoming appointments via the [email or web address] free online health management tool. You can learn more and sign up at [email or web address]. For assistance setting up your [email or web address] account, please call [phone].    Once again, we want to ensure your discharge home is safe and that you have a clear understanding of any next steps in your care. If you have any questions or concerns, please do not hesitate to contact us, we are here for you. Thank you for choosing [facility] for your healthcare needs.    Sincerely,    Your [facility] Healthcare Team

## 2017-05-28 PROBLEM — N17.9 AKI (ACUTE KIDNEY INJURY) (HCC): Status: ACTIVE | Noted: 2017-05-28

## 2017-05-28 PROBLEM — R19.7 DIARRHEA: Status: ACTIVE | Noted: 2017-05-28

## 2017-05-28 PROBLEM — R10.9 ABDOMINAL PAIN: Status: ACTIVE | Noted: 2017-05-28

## 2017-05-28 LAB
ALBUMIN SERPL BCP-MCNC: 3.1 G/DL (ref 3.2–4.9)
ALBUMIN/GLOB SERPL: 1.2 G/DL
ALP SERPL-CCNC: 179 U/L (ref 30–99)
ALT SERPL-CCNC: 38 U/L (ref 2–50)
ANION GAP SERPL CALC-SCNC: 16 MMOL/L (ref 0–11.9)
AST SERPL-CCNC: 46 U/L (ref 12–45)
BASOPHILS # BLD AUTO: 0.4 % (ref 0–1.8)
BASOPHILS # BLD: 0.02 K/UL (ref 0–0.12)
BILIRUB SERPL-MCNC: 0.7 MG/DL (ref 0.1–1.5)
BUN SERPL-MCNC: 60 MG/DL (ref 8–22)
CALCIUM SERPL-MCNC: 7.2 MG/DL (ref 8.5–10.5)
CHLORIDE SERPL-SCNC: 100 MMOL/L (ref 96–112)
CO2 SERPL-SCNC: 20 MMOL/L (ref 20–33)
CREAT SERPL-MCNC: 4.1 MG/DL (ref 0.5–1.4)
EOSINOPHIL # BLD AUTO: 0 K/UL (ref 0–0.51)
EOSINOPHIL NFR BLD: 0 % (ref 0–6.9)
ERYTHROCYTE [DISTWIDTH] IN BLOOD BY AUTOMATED COUNT: 42.4 FL (ref 35.9–50)
GFR SERPL CREATININE-BSD FRML MDRD: 12 ML/MIN/1.73 M 2
GLOBULIN SER CALC-MCNC: 2.6 G/DL (ref 1.9–3.5)
GLUCOSE SERPL-MCNC: 134 MG/DL (ref 65–99)
HCT VFR BLD AUTO: 31.5 % (ref 37–47)
HEMOCCULT STL QL: POSITIVE
HGB BLD-MCNC: 10.3 G/DL (ref 12–16)
IMM GRANULOCYTES # BLD AUTO: 0.01 K/UL (ref 0–0.11)
IMM GRANULOCYTES NFR BLD AUTO: 0.2 % (ref 0–0.9)
LYMPHOCYTES # BLD AUTO: 0.49 K/UL (ref 1–4.8)
LYMPHOCYTES NFR BLD: 10.6 % (ref 22–41)
MCH RBC QN AUTO: 29.2 PG (ref 27–33)
MCHC RBC AUTO-ENTMCNC: 32.7 G/DL (ref 33.6–35)
MCV RBC AUTO: 89.2 FL (ref 81.4–97.8)
MONOCYTES # BLD AUTO: 0.13 K/UL (ref 0–0.85)
MONOCYTES NFR BLD AUTO: 2.8 % (ref 0–13.4)
NEUTROPHILS # BLD AUTO: 3.98 K/UL (ref 2–7.15)
NEUTROPHILS NFR BLD: 86 % (ref 44–72)
NRBC # BLD AUTO: 0 K/UL
NRBC BLD AUTO-RTO: 0 /100 WBC
PLATELET # BLD AUTO: 172 K/UL (ref 164–446)
PMV BLD AUTO: 10.9 FL (ref 9–12.9)
POTASSIUM SERPL-SCNC: 3.7 MMOL/L (ref 3.6–5.5)
PROT SERPL-MCNC: 5.7 G/DL (ref 6–8.2)
RBC # BLD AUTO: 3.53 M/UL (ref 4.2–5.4)
SODIUM SERPL-SCNC: 136 MMOL/L (ref 135–145)
WBC # BLD AUTO: 4.6 K/UL (ref 4.8–10.8)

## 2017-05-28 PROCEDURE — 700105 HCHG RX REV CODE 258: Performed by: INTERNAL MEDICINE

## 2017-05-28 PROCEDURE — 700102 HCHG RX REV CODE 250 W/ 637 OVERRIDE(OP): Performed by: INTERNAL MEDICINE

## 2017-05-28 PROCEDURE — 82272 OCCULT BLD FECES 1-3 TESTS: CPT

## 2017-05-28 PROCEDURE — 80053 COMPREHEN METABOLIC PANEL: CPT

## 2017-05-28 PROCEDURE — 700101 HCHG RX REV CODE 250: Performed by: INTERNAL MEDICINE

## 2017-05-28 PROCEDURE — A9270 NON-COVERED ITEM OR SERVICE: HCPCS | Performed by: COLON & RECTAL SURGERY

## 2017-05-28 PROCEDURE — 3E0334Z INTRODUCTION OF SERUM, TOXOID AND VACCINE INTO PERIPHERAL VEIN, PERCUTANEOUS APPROACH: ICD-10-PCS | Performed by: INTERNAL MEDICINE

## 2017-05-28 PROCEDURE — 90670 PCV13 VACCINE IM: CPT | Performed by: HOSPITALIST

## 2017-05-28 PROCEDURE — 90471 IMMUNIZATION ADMIN: CPT

## 2017-05-28 PROCEDURE — 700102 HCHG RX REV CODE 250 W/ 637 OVERRIDE(OP): Performed by: HOSPITALIST

## 2017-05-28 PROCEDURE — A9270 NON-COVERED ITEM OR SERVICE: HCPCS | Performed by: INTERNAL MEDICINE

## 2017-05-28 PROCEDURE — 700111 HCHG RX REV CODE 636 W/ 250 OVERRIDE (IP): Performed by: HOSPITALIST

## 2017-05-28 PROCEDURE — 700102 HCHG RX REV CODE 250 W/ 637 OVERRIDE(OP): Performed by: COLON & RECTAL SURGERY

## 2017-05-28 PROCEDURE — 700111 HCHG RX REV CODE 636 W/ 250 OVERRIDE (IP): Performed by: INTERNAL MEDICINE

## 2017-05-28 PROCEDURE — 770001 HCHG ROOM/CARE - MED/SURG/GYN PRIV*

## 2017-05-28 PROCEDURE — 85025 COMPLETE CBC W/AUTO DIFF WBC: CPT

## 2017-05-28 PROCEDURE — 99291 CRITICAL CARE FIRST HOUR: CPT | Performed by: INTERNAL MEDICINE

## 2017-05-28 PROCEDURE — A9270 NON-COVERED ITEM OR SERVICE: HCPCS | Performed by: HOSPITALIST

## 2017-05-28 RX ADMIN — VANCOMYCIN HYDROCHLORIDE 125 MG: 10 INJECTION, POWDER, LYOPHILIZED, FOR SOLUTION INTRAVENOUS at 00:20

## 2017-05-28 RX ADMIN — VANCOMYCIN HYDROCHLORIDE 125 MG: 10 INJECTION, POWDER, LYOPHILIZED, FOR SOLUTION INTRAVENOUS at 05:15

## 2017-05-28 RX ADMIN — HEPARIN SODIUM 5000 UNITS: 5000 INJECTION, SOLUTION INTRAVENOUS; SUBCUTANEOUS at 05:14

## 2017-05-28 RX ADMIN — HYDROCORTISONE SODIUM SUCCINATE 50 MG: 100 INJECTION, POWDER, FOR SOLUTION INTRAMUSCULAR; INTRAVENOUS at 05:14

## 2017-05-28 RX ADMIN — SODIUM CHLORIDE: 9 INJECTION, SOLUTION INTRAVENOUS at 15:51

## 2017-05-28 RX ADMIN — OMEPRAZOLE 20 MG: 20 CAPSULE, DELAYED RELEASE ORAL at 20:54

## 2017-05-28 RX ADMIN — VANCOMYCIN HYDROCHLORIDE 125 MG: 10 INJECTION, POWDER, LYOPHILIZED, FOR SOLUTION INTRAVENOUS at 18:55

## 2017-05-28 RX ADMIN — HEPARIN SODIUM 5000 UNITS: 5000 INJECTION, SOLUTION INTRAVENOUS; SUBCUTANEOUS at 13:46

## 2017-05-28 RX ADMIN — PIPERACILLIN SODIUM AND TAZOBACTAM SODIUM 4.5 G: 4; .5 INJECTION, POWDER, FOR SOLUTION INTRAVENOUS at 05:15

## 2017-05-28 RX ADMIN — HYDROCORTISONE SODIUM SUCCINATE 50 MG: 100 INJECTION, POWDER, FOR SOLUTION INTRAMUSCULAR; INTRAVENOUS at 00:20

## 2017-05-28 RX ADMIN — VANCOMYCIN HYDROCHLORIDE 125 MG: 10 INJECTION, POWDER, LYOPHILIZED, FOR SOLUTION INTRAVENOUS at 12:14

## 2017-05-28 RX ADMIN — HYDROCORTISONE SODIUM SUCCINATE 50 MG: 100 INJECTION, POWDER, FOR SOLUTION INTRAMUSCULAR; INTRAVENOUS at 12:14

## 2017-05-28 RX ADMIN — PIPERACILLIN AND TAZOBACTAM 2.25 G: 2; .25 INJECTION, POWDER, LYOPHILIZED, FOR SOLUTION INTRAVENOUS; PARENTERAL at 13:46

## 2017-05-28 RX ADMIN — HYDROCORTISONE SODIUM SUCCINATE 50 MG: 100 INJECTION, POWDER, FOR SOLUTION INTRAMUSCULAR; INTRAVENOUS at 18:56

## 2017-05-28 RX ADMIN — PIPERACILLIN SODIUM AND TAZOBACTAM SODIUM 4.5 G: 4; .5 INJECTION, POWDER, FOR SOLUTION INTRAVENOUS at 00:20

## 2017-05-28 RX ADMIN — HEPARIN SODIUM 5000 UNITS: 5000 INJECTION, SOLUTION INTRAVENOUS; SUBCUTANEOUS at 20:58

## 2017-05-28 RX ADMIN — PNEUMOCOCCAL 13-VALENT CONJUGATE VACCINE 0.5 ML: 2.2; 2.2; 2.2; 2.2; 2.2; 4.4; 2.2; 2.2; 2.2; 2.2; 2.2; 2.2; 2.2 INJECTION, SUSPENSION INTRAMUSCULAR at 12:17

## 2017-05-28 RX ADMIN — ASCORBIC ACID: 500 INJECTION, SOLUTION INTRAMUSCULAR; INTRAVENOUS; SUBCUTANEOUS at 08:35

## 2017-05-28 RX ADMIN — OMEPRAZOLE 20 MG: 20 CAPSULE, DELAYED RELEASE ORAL at 08:35

## 2017-05-28 RX ADMIN — PIPERACILLIN AND TAZOBACTAM 2.25 G: 2; .25 INJECTION, POWDER, LYOPHILIZED, FOR SOLUTION INTRAVENOUS; PARENTERAL at 21:00

## 2017-05-28 RX ADMIN — ASCORBIC ACID: 500 INJECTION, SOLUTION INTRAMUSCULAR; INTRAVENOUS; SUBCUTANEOUS at 00:20

## 2017-05-28 RX ADMIN — THIAMINE HYDROCHLORIDE 200 MG: 100 INJECTION, SOLUTION INTRAMUSCULAR; INTRAVENOUS at 08:34

## 2017-05-28 ASSESSMENT — ENCOUNTER SYMPTOMS
COUGH: 0
VOMITING: 0
ORTHOPNEA: 0
SENSORY CHANGE: 0
DIARRHEA: 0
SHORTNESS OF BREATH: 0
GASTROINTESTINAL NEGATIVE: 1
WHEEZING: 0
PALPITATIONS: 0
ABDOMINAL PAIN: 1
CHILLS: 0
BACK PAIN: 0
DIZZINESS: 0
EYES NEGATIVE: 1
MYALGIAS: 0
CONSTITUTIONAL NEGATIVE: 1
SORE THROAT: 0
RESPIRATORY NEGATIVE: 1
HEADACHES: 0
NAUSEA: 0
FEVER: 0
CARDIOVASCULAR NEGATIVE: 1
HEMOPTYSIS: 0
FOCAL WEAKNESS: 0
MUSCULOSKELETAL NEGATIVE: 1
NEUROLOGICAL NEGATIVE: 1

## 2017-05-28 ASSESSMENT — PAIN SCALES - GENERAL
PAINLEVEL_OUTOF10: 0

## 2017-05-28 NOTE — PROGRESS NOTES
[facility] Team Attending Note  (see full Resident note dictated in EPIC)    Date of Service 5/28/2017    Interval Events:  Tmax 98.1  +5.5L  over the last 24 hours, +5.5 since admit.  CXR shows: none today.   Awake, alert and oriented.   SR,  systolic.   Diarrhea is improved. No stool overnight or this AM.         Physical Exam  General:  Pleasant, sitting up. Comfortable appearing.   Neuro/Psych: Alert and oriented. Talkative.   HEENT: PERRL.   CVS: Heart rate normal.   Respiratory: Clear to auscultation bilaterally.   Abdomen/:Soft, mild tenderness in the epigastric region and right upper quadrant with no rebound or guarding.   Extremities: No edema       Assessment:  - Septic Shock - abdominal source, lactate 6, renee, on pressors, immunocompromised. improving  - Abdominal Pain - ct abdo osh (-), exam w ttp rlq/ruq w + rebound, no sig guarding. US with enlarged GG w/o stones. 3 days n/v/d  - RENEE - in setting of sepsis, last cr 0.67 in 3/30/2017, improving (6->4), non oliguric  - AGMA - lactic acidosis + renee  - Lactic Acidosis - resolved  - Immunocompromised Host - chronic steroids, humira, methotrexate, autoimmune disease  - Lupus  - Leukocytosis - improved  - Hyponatremia - resolved  - Elevated liver enzymes - improving, ct abdo and us reviewed  - Hx of pancreatitis  - Recent C Diff Colitis      Plan:  - d/c vit c/thiamine  - continue hydrocortisone (chronic steroid use)  - continue abx (zosyn x 5 days with empiric vanco po while on abx)  - advance diet  - f/u cultures   - gi eval    Gtts:       abx   Vancomycin oral  zosyn     bcx 5/27 pending   ucx 27 pending  C diff PCR positive, toxin negative     Hydrocortisone  50 Q6    I have seen and examined the patient today.  I have reviewed the medical record, laboratory data, imaging and all relevant studies.  I have discussed the plan of care with the Internal Medicine Resident and agree with the note and plan as documented.       Total critical care  time, not including billable procedures 40 minutes.    Silke GOODEN (Mayte), am scribing for, and in the presence of, Kevin Armendariz M.D.   Electronically signed by: Silke Valentine (Mayte), 5/28/2017  IKevin M.D. personally performed the services described in this documentation, as scribed by Silke Valentine in my presence, and it is both accurate and complete.

## 2017-05-28 NOTE — CONSULTS
DATE OF SERVICE:  05/27/2017    DATE OF CONSULTATION:  05/27/2017      REQUESTING PHYSICIAN:  Ehsan Quevedo MD      REASON FOR CONSULTATION:  To evaluate patient with acute kidney injury and   metabolic acidosis.      HISTORY OF PRESENT ILLNESS:  The patient is a 51-year-old female with baseline   creatinine level of 0.6, 0.7, history of systemic lupus erythematosus   diagnosed in 2007 and hypertension.  Patient presented to [facility] with complaints of 3-4 days of severe nausea, vomiting and diarrhea,   not able to keep fluid down.  Upon evaluation, found to be in acute kidney   injury with severe metabolic acidosis, lactic acidosis and hypotensive.  At   that point, patient was transferred to [facility] for further   evaluation and treatment.  Currently, patient is doing better, complaints of   nausea, but not vomiting.  Also, complaining of abdominal discomfort.  Kovacs   catheter placed with good urine output.  Denies any headache, no fever or   chills, no sick contacts, no chest pain, no shortness of breath.  No myalgias   or arthralgias.  No edema.      REVIEW OF SYSTEMS:  All 14 points reveal negative except history of present   illness per CMS/AMA requirements.      PAST MEDICAL HISTORY:  Hypertension, systemic lupus erythematosus, psoriatic   arthritis pancreatitis.      PAST SURGICAL HISTORY:  D and C and hysteroscopy.      FAMILY HISTORY:  Diabetes mellitus type 2, hypertension, cancer and lupus in   aunt and her daughter.      SOCIAL HISTORY:  Patient is .  No tobacco, alcohol or drug use.      ALLERGIES:  ALLERGIC TO CODEINE.      OUTPATIENT MEDICATIONS:  Reviewed in the chart.      PHYSICAL EXAMINATION:    VITAL SIGNS:  Blood pressure 127/65, heart rate 120, temperature 37.3 Celsius.      GENERAL APPEARANCE:  Well-developed, well-nourished female in no acute   distress.    HEENT:  Normocephalic, atraumatic.  Pupils are equal, round and reactive to   light.   Extraocular movements intact.  Oropharynx is clear, dry mucosa, no   erythema or exudate.    NECK:  Supple, no lymphadenopathy, no thyromegaly appreciated.  Right IJ   triple lumen catheter in place.    LUNGS:  Clear to auscultation bilaterally.  No rales, wheezes, or rhonchi.    HEART:  Regular rhythm.  No rub or gallop.    ABDOMEN:  Soft, mildly tender to palpation.  Bowel sounds present.  No   palpable masses.  No rebound tenderness.    EXTREMITIES:  No cyanosis, no clubbing, no edema.    NEUROLOGIC:  Alert and oriented x3.  No focal deficit.      LABORATORY RESULTS:   Revealed hemoglobin 10.2.    Sodium 131, potassium 3.9, CO2 improving with bicarbonate drip from 9 to 17,   BUN 79, creatinine improved from 5.9 to 4.9.  Lactic acid improved from 6.3 to 1.3.    Urinalysis, small occult blood, red blood cells 2-5, negative for protein.      ASSESSMENT AND PLAN:  The patient is a 51-year-old female with acute kidney   injury secondary to volume depletion from nausea, vomiting and diarrhea.    1.  Acute kidney injury, improving, remains good urine output, to monitor.    2.  Electrolytes, mild hyponatremia, improving, to monitor.    3.  Hypotension.  Continue pressors to keep mean arterial pressure above 65.    4.  Metabolic acidosis, improving with bicarbonate drip.      RECOMMENDATIONS:  No need for emergent dialysis, to monitor closely, basic   metabolic panel.  Continue current treatment.      Thank you for the consult.       ____________________________________     MD GUZMAN CROOKS / VICTOR HUGO    DD:  05/27/2017 14:37:38  DT:  05/27/2017 18:13:15    D#:  [id number]  Job#:  [id number]

## 2017-05-28 NOTE — DIETARY
"  Nutrition Services - Poor PO PTA     Brandi Leggett is a 51 y.o. female with admitting DX of Septic shock (CMS-HCC)  Pertinent History:     Past Medical History   Diagnosis Date   • Lupus (CMS-HCC)    • Fibroids    • SLE (systemic lupus erythematosus) (CMS-HCC)    • Psoriatic arthritis (CMS-HCC)    • Pancreatitis    • Arthritis      N/V/diarrhea x 4 days PTA    Allergies: Codeine  Height: 160 cm (5' 3\")  Weight: 79.4 kg (175 lb 0.7 oz)  Body mass index is 31.02 kg/(m^2).     Pt reports being over 200lbs and losing weight r/t grief from father's death    Pertinent Labs:   Recent Labs      05/27/17   1330  05/27/17   1650  05/28/17   0535   SODIUM 101  131*  132*  136   POTASSIUM 102  3.9  3.8  3.7     79*  79*  60*   CREATININE 109  4.96*  4.64*  4.10*   GLUCOSE 112  91  132*  134*   CALCIUM 105  6.6*  7.3*  7.2*   PHOSPHORUS 120  6.5*   --    --      53*   --   46*     42   --   38   ALBUMIN 111  2.8*   --   3.1*   TOTAL BILIRUBIN 113  0.7   --   0.7   WBC 1501  11.4*   --   4.6*   HGB 1503  10.2*   --   10.3*   HCT 1504  30.4*   --   31.5*   RBC 1502  3.45*   --   3.53*       Last BM: 05/27/17  Pertinent Medications: ascorbic acid, heparin, solu-cortef, prilosec, zosyn, thiamine, vancomycin    Skin:       No pressure areas documented at this time   No edema documented at this time    Pt is NPO at this time.   Pt stated that her poor PO was related to feeling ill and that she is normally a good eater.       Plan / Recommendation:     Resume PO feedings as soon as medically feasible. If unable to begin PO feeds within 3 days while in the ICU, consider nutrition support to meet nutrition needs.     RD will continue to monitor       "

## 2017-05-28 NOTE — PROGRESS NOTES
"[facility] Internal [facility] Interval Note    After 5PM or if no immediate response to page, please call for cross-coverage  Attending/Team: [facility] Team MALLY Armendariz Call [phone] to page    2nd Call - Day Sr. Resident (R2/R3):   Kade    Name Huber Mc      ROLLY 1963   Age/Sex 53 y.o. female   MRN [id number]   Code Status Full        Septic shock from ?abd source source with multiorgan failure - resolved   Abdominal pain, nausea, vomiting - improved  C diff colitis  - presented with abdominal pain with nausea, vomiting, watery loose stools for 3 days  , hypotension, WBC elevation  -Cdiff PCR+, toxin AB NGT ; - h/o C -diff colitis 2017    - CT abd/pelvis at outside facility without contrast negative for intraabdominal pathology    - USG shows \"distended gall bladder, No evidence of gallstone or biliary ductal dilatation, Enlarged echogenic liver consistent with fatty change versus hepatocellular dysfunction\".    -was treated with  Septic protocol,  Empiric IV zosyn, IV vanco,  on oral vanco for C diff  ; IV vanco dc'd   - vitamin C, thiamine, solu cortef    - off levophed  Since yesterday - VSs   - Dr Ibrahim, surgery consulted for possible early peritonitis  - conservative mgm  -consulted with GI - will see tomorrow      Severe anion gap metabolic acidosis /lactic  - improving  -secondary to above and RENEE    Non oliguric acute kidney injury  - improving  - FeNa consistent with intrinsic renal failure  ?ATN  -  never diagnosed with lupus nephritis  ; no hx CKD  - improving with IVF  - on llanos catheter    - USG renal within normal limits    - continue  IVF     Rheumatoid arthritis    SLE  - home dose on methotrexate, humira, prednisone    -immune system and possibly adranal glands chronic supression    Tophaceous gout   -on allopurinol    H/o alcohol abuse (quit 3/2017)  Hepatic steatosis    Elevated transaminase   - Maddrey score 11.3    - quit alcohol since her father passed away on 3/2017     Normocytic " anemia     Hypovolemic hyponatremia    -mild; cont mgm as above; CTM    Cervical DJD   - evaluated by Neurosurgery awaiting surgery     Hyperlipidemia   - held home gemfibrozil, pravastatin     H/o hematuria   - ? Due to methotrexate use    - followed by Urology     Anticipated Hospital stay:  >2 midnights        Quality Measures  EKG reviewed, Labs reviewed, Medications reviewed and Radiology images reviewed  Kovacs catheter: Critically Ill - Requiring Accurate Measurement of Urinary Output  DVT Prophylaxis: Heparin  DVT prophylaxis - mechanical: SCDs  Ulcer prophylaxis: Yes  Antibiotics: Treating active infection/contamination beyond 24 hours perioperative coverage      Hydrocortisone  50 Q6 Date of Service: 5/28/2017    Chief Complaint / Primary Diagnosis  Nausea, vomiting, abd apin    Interval Problem Daily Status Update  Patient has no specific complaints. Nausea, vomiting resolved; no diarrhea today; afebrile, VSs, off pressor  Called GI and asked to see the patient tomorrow. On Full liquid diet  Stable to be transferred out of ICU    Consultants/Specialty  Dr Ibrahim, Surgery  Dr Armendariz, [facility]    Disposition  Stable to be transferred out of ICU    Message for [facility] team     51 yrs old female with PMH of SLE, rheumatoid arthritis, recurrent pancreatitis, alcohol abuse (quit 3/2017), Cdiff colitis 1/2017, was transferred from [facility] for septic shock, with lactic acid at 6. She was on levophed through peripheral IV line, s/p empiric antibiotics, IVF.    Surgery consulted for possible early peritonitis - conservative [facility]  Nephrology - for RENEE (no Hx CKD/nephritis)  GI to see the patient tomorrow  Her shock resolved; abd pain improved, started on FLD  To f/u:  Advance diet as tolerated  Cont IVF; Monitor kidney function  Reevaluate AB therapy (currently on IV zosyn and PO  Vanco), otherwise continue zosyn for 5 days and vanco po for 12 days; f/u cultures  Cont hydrocortisone for 2 days  F/u speciality  services recommendations         Physical Exam    Filed Vitals:    05/28/17 0400 05/28/17 0500 05/28/17 0600 05/28/17 0700   BP:       Pulse: 57 56 66    Temp: 36.7 °C (98.1 °F)  36.8 °C (98.3 °F)    Resp: 20  19 20   Height:       Weight:       SpO2: 99% 99% 98%          Body mass index is 31.02 kg/(m^2). Weight: 79.4 kg (175 lb 0.7 oz)  Oxygen Therapy:  Pulse Oximetry: 98 %, O2 (LPM): 2, O2 Delivery: Silicone Nasal Cannula    Physical Exam   Constitutional: She is oriented to person, place, and time and well-developed, well-nourished, and in no distress. Vital signs are normal.   HENT:   Head: Normocephalic and atraumatic.   Eyes: Pupils are equal, round, and reactive to light.   Cardiovascular: Normal rate, regular rhythm, S1 normal, S2 normal, normal heart sounds and normal pulses.    Pulmonary/Chest: Effort normal and breath sounds normal.   Abdominal: Soft. Bowel sounds are normal. There is tenderness in the epigastric area.   Neurological: She is alert and oriented to person, place, and time.   Skin: Skin is warm and dry.   Psychiatric: Mood, memory, affect and judgment normal.      Review of Systems   Constitutional: Negative.    HENT: Negative.    Eyes: Negative.    Respiratory: Negative.    Cardiovascular: Negative.    Gastrointestinal: Negative.    Genitourinary: Negative.    Musculoskeletal: Negative.    Skin: Negative.    Neurological: Negative.       Laboratory/Imaging    5/28/2017  7:38 AM    Recent Labs      05/27/17   1330  05/27/17   1650  05/28/17   0535   SODIUM  131*  132*  136   POTASSIUM  3.9  3.8  3.7   CHLORIDE  96  96  100   CO2  17*  22  20   BUN  79*  79*  60*   CREATININE  4.96*  4.64*  4.10*   PHOSPHORUS  6.5*   --    --    CALCIUM  6.6*  7.3*  7.2*       Recent Labs      05/27/17   0623  05/27/17   0949  05/27/17   1330  05/27/17   1650  05/28/17   0535   ALTSGPT  47   --   42   --   38   ASTSGOT  68*   --   53*   --   46*   ALKPHOSPHAT  194*   --   184*   --   179*   TBILIRUBIN  0.7    --   0.7   --   0.7   LIPASE   --   49   --    --    --    GLUCOSE  114*   --   91  132*  134*       Recent Labs      05/27/17 0623 05/27/17   1330  05/28/17   0535   RBC  3.82*  3.45*  3.53*   HEMOGLOBIN  11.2*  10.2*  10.3*   HEMATOCRIT  34.0*  30.4*  31.5*   PLATELETCT  309  175  172   PROTHROMBTM   --   14.3   --    APTT   --   30.3   --    INR   --   1.08   --        Recent Labs      05/27/17 0623 05/27/17 1330 05/28/17   0535   WBC  16.9*  11.4*  4.6*   NEUTSPOLYS  85.10*  75.30*  86.00*   LYMPHOCYTES  9.60*  16.20*  10.60*   MONOCYTES  4.50  7.10  2.80   EOSINOPHILS  0.00  0.60  0.00   BASOPHILS  0.50  0.50  0.40   ASTSGOT  68*  53*  46*   ALTSGPT  47  42  38   ALKPHOSPHAT  194*  184*  179*   TBILIRUBIN  0.7  0.7  0.7    EKG reviewed, Labs reviewed, Medications reviewed and Radiology images reviewed  Kovacs catheter: Critically Ill - Requiring Accurate Measurement of Urinary Output      DVT Prophylaxis: Heparin    Ulcer prophylaxis: Yes  Antibiotics: Treating active infection/contamination beyond 24 hours perioperative coverage

## 2017-05-28 NOTE — CARE PLAN
Problem: Communication  Goal: The ability to communicate needs accurately and effectively will improve  Outcome: PROGRESSING AS EXPECTED  Reinforce need to call for assistance    Problem: Safety  Goal: Will remain free from injury  Outcome: PROGRESSING AS EXPECTED  Reinforce need to not get out of bed without assistance    Problem: Skin Integrity  Goal: Risk for impaired skin integrity will decrease  Outcome: PROGRESSING SLOWER THAN EXPECTED  Reinforce need to turn and move frequently

## 2017-05-28 NOTE — CARE PLAN
Problem: Infection  Goal: Will remain free from infection  Outcome: PROGRESSING AS EXPECTED  Assessing for signs and symptoms of infection, performing hand hygiene before and after patient contact.    Problem: Fluid Volume:  Goal: Will maintain balanced intake and output  Outcome: PROGRESSING AS EXPECTED  Strict monitoring of I&O, monitoring CVP.

## 2017-05-28 NOTE — PROGRESS NOTES
Surgical Progress Note    Author: Elena Haro Date & Time created: 2017   7:58 AM     Interval Events:  51-year-old female, consulted by Dr. Ibrahim yesterday for worsening chronic abdominal pain, diarrhea, on multiple immunosuppressive medication.   This morning pt is sitting up in bed watching TV, feeling better. Abdominal pain in better, now with epigastric pain, worse when taking to many ice chips. Denies nausea, vomiting, fever, chills. +flatus, no diarrhea. Kovacs catheter in place.    Review of Systems   Constitutional: Negative for fever and chills.   Respiratory: Negative for cough and shortness of breath.    Cardiovascular: Negative for chest pain and palpitations.   Gastrointestinal: Positive for abdominal pain (epigastric). Negative for nausea, vomiting and diarrhea.   Genitourinary:        Kovacs catheter in place     Hemodynamics:  Temp (24hrs), Av.9 °C (98.4 °F), Min:36.7 °C (98.1 °F), Max:37.3 °C (99.1 °F)  Temperature: 36.8 °C (98.3 °F)  Pulse  Av.9  Min: 56  Max: 128Heart Rate (Monitored): 65  NIBP: 127/70 mmHg  CVP (mm Hg): (!) 11 MM HG  Respiratory:    Respiration: 20, Pulse Oximetry: 98 %, O2 Daily Delivery Respiratory : Silicone Nasal Cannula        RUL Breath Sounds: Diminished, RML Breath Sounds: Diminished, RLL Breath Sounds: Diminished, ARNOL Breath Sounds: Diminished, LLL Breath Sounds: Diminished  Neuro:  GCS Total Mary Coma Score: 15     Fluids:    Intake/Output Summary (Last 24 hours) at 17 0758  Last data filed at 17 0600   Gross per 24 hour   Intake 9775.47 ml   Output   4290 ml   Net 5485.47 ml     Weight: 79.4 kg (175 lb 0.7 oz)  Current Diet Order   Procedures   • Diet NPO     Physical Exam   Constitutional: She is oriented to person, place, and time. She appears well-developed and well-nourished. No distress.   HENT:   Head: Normocephalic and atraumatic.   Eyes: Conjunctivae are normal.   Neck: Normal range of motion. Neck supple.   Cardiovascular: Normal  rate and regular rhythm.    Pulmonary/Chest: Effort normal. No respiratory distress.   Abdominal: Soft. She exhibits no distension and no mass. There is tenderness (epigastric). There is no rebound and no guarding.   Musculoskeletal: Normal range of motion.   Neurological: She is alert and oriented to person, place, and time.   Skin: Skin is warm and dry. No rash noted. No erythema. No pallor.   Psychiatric: She has a normal mood and affect.     Labs:  Recent Results (from the past 24 hour(s))   EKG (ER)    Collection Time: 17  8:38 AM   Result Value Ref Range    Report       [facility] Emergency Dept.    Test Date:  2017  Pt Name:    WILLIAM ROWE               Department: ER  MRN:        [id number]                      Room:       [facility]  Gender:     F                            Technician: [id number]  :        [id number]                   Requested By:GABRIEL WONG  Order #:    [id number]                    Reading MD: GABRIEL WONG MD    Measurements  Intervals                                Axis  Rate:       111                          P:          58  SC:         136                          QRS:        -5  QRSD:       76                           T:          67  QT:         368  QTc:        500    Interpretive Statements  SINUS TACHYCARDIA  BORDERLINE INFERIOR Q WAVES  BORDERLINE PROLONGED QT INTERVAL  Compared to ECG 2014 00:30:55  No significant changes    Electronically Signed On 2017 13:21:55 PDT by GABRIEL WONG MD     BLOOD CULTURE,HOLD    Collection Time: 17  9:30 AM   Result Value Ref Range    Blood Culture Hold Collected    Lactic Acid -STAT Once    Collection Time: 17  9:49 AM   Result Value Ref Range    Lactic Acid 1.9 0.5 - 2.0 mmol/L   BLOOD CULTURE,HOLD    Collection Time: 17  9:49 AM   Result Value Ref Range    Blood Culture Hold Collected    CREATINE KINASE    Collection Time: 17  9:49 AM   Result Value Ref Range    CPK  Total 145 0 - 154 U/L   LIPASE    Collection Time: 05/27/17  9:49 AM   Result Value Ref Range    Lipase 49 11 - 82 U/L   Arterial blood gas    Collection Time: 05/27/17 10:06 AM   Result Value Ref Range    Ph 7.36 (L) 7.40 - 7.50    Pco2 26.2 26.0 - 37.0 mmHg    Po2 105.7 (H) 64.0 - 87.0 mmHg    O2 Saturation 96.5 93.0 - 99.0 %    Hco3 15 (L) 17 - 25 mmol/L    Base Excess -9 (L) -4 - 3 mmol/L    Body Temp 36.7 Centigrade    O2 Therapy 2.0 2.0 - 10.0 L/min    Ph -TC 7.37 (L) 7.40 - 7.50    Pco2 -TC 25.9 (L) 26.0 - 37.0 mmHg    Po2 -.9 (H) 64.0 - 87.0 mmHg   CBC WITH DIFFERENTIAL    Collection Time: 05/27/17  1:30 PM   Result Value Ref Range    WBC 11.4 (H) 4.8 - 10.8 K/uL    RBC 3.45 (L) 4.20 - 5.40 M/uL    Hemoglobin 10.2 (L) 12.0 - 16.0 g/dL    Hematocrit 30.4 (L) 37.0 - 47.0 %    MCV 88.1 81.4 - 97.8 fL    MCH 29.6 27.0 - 33.0 pg    MCHC 33.6 33.6 - 35.0 g/dL    RDW 42.3 35.9 - 50.0 fL    Platelet Count 175 164 - 446 K/uL    MPV 10.2 9.0 - 12.9 fL    Neutrophils-Polys 75.30 (H) 44.00 - 72.00 %    Lymphocytes 16.20 (L) 22.00 - 41.00 %    Monocytes 7.10 0.00 - 13.40 %    Eosinophils 0.60 0.00 - 6.90 %    Basophils 0.50 0.00 - 1.80 %    Immature Granulocytes 0.30 0.00 - 0.90 %    Nucleated RBC 0.00 /100 WBC    Neutrophils (Absolute) 8.56 (H) 2.00 - 7.15 K/uL    Lymphs (Absolute) 1.84 1.00 - 4.80 K/uL    Monos (Absolute) 0.81 0.00 - 0.85 K/uL    Eos (Absolute) 0.07 0.00 - 0.51 K/uL    Baso (Absolute) 0.06 0.00 - 0.12 K/uL    Immature Granulocytes (abs) 0.03 0.00 - 0.11 K/uL    NRBC (Absolute) 0.00 K/uL   COMP METABOLIC PANEL    Collection Time: 05/27/17  1:30 PM   Result Value Ref Range    Sodium 131 (L) 135 - 145 mmol/L    Potassium 3.9 3.6 - 5.5 mmol/L    Chloride 96 96 - 112 mmol/L    Co2 17 (L) 20 - 33 mmol/L    Anion Gap 18.0 (H) 0.0 - 11.9    Glucose 91 65 - 99 mg/dL    Bun 79 (HH) 8 - 22 mg/dL    Creatinine 4.96 (H) 0.50 - 1.40 mg/dL    Calcium 6.6 (LL) 8.5 - 10.5 mg/dL    AST(SGOT) 53 (H) 12 - 45 U/L     ALT(SGPT) 42 2 - 50 U/L    Alkaline Phosphatase 184 (H) 30 - 99 U/L    Total Bilirubin 0.7 0.1 - 1.5 mg/dL    Albumin 2.8 (L) 3.2 - 4.9 g/dL    Total Protein 5.2 (L) 6.0 - 8.2 g/dL    Globulin 2.4 1.9 - 3.5 g/dL    A-G Ratio 1.2 g/dL   PHOSPHORUS    Collection Time: 05/27/17  1:30 PM   Result Value Ref Range    Phosphorus 6.5 (H) 2.5 - 4.5 mg/dL   LACTIC ACID    Collection Time: 05/27/17  1:30 PM   Result Value Ref Range    Lactic Acid 1.3 0.5 - 2.0 mmol/L   PROTHROMBIN TIME    Collection Time: 05/27/17  1:30 PM   Result Value Ref Range    PT 14.3 12.0 - 14.6 sec    INR 1.08 0.87 - 1.13   APTT    Collection Time: 05/27/17  1:30 PM   Result Value Ref Range    APTT 30.3 24.7 - 36.0 sec   ESTIMATED GFR    Collection Time: 05/27/17  1:30 PM   Result Value Ref Range    GFR If  11 (A) >60 mL/min/1.73 m 2    GFR If Non African American 9 (A) >60 mL/min/1.73 m 2   CORTISOL    Collection Time: 05/27/17  1:35 PM   Result Value Ref Range    Cortisol 10.1 0.0 - 23.0 ug/dL   URINE SODIUM RANDOM    Collection Time: 05/27/17  1:40 PM   Result Value Ref Range    Sodium, Urine -per volume 113 mmol/L   URINE CREATININE RANDOM    Collection Time: 05/27/17  1:40 PM   Result Value Ref Range    Creatinine, Random Urine 31.00 mg/dL   URINE DRUG SCREEN    Collection Time: 05/27/17  1:40 PM   Result Value Ref Range    Amphetamines Urine Negative Negative    Barbiturates Negative Negative    Benzodiazepines Negative Negative    Cocaine Metabolite Negative Negative    Methadone Negative Negative    Ecstasy Negative Negative    Opiates Positive (A) Negative    Oxycodone Negative Negative    Phencyclidine -Pcp Negative Negative    Propoxyphene Negative Negative    Cannabinoid Metab Negative Negative   BASIC METABOLIC PANEL    Collection Time: 05/27/17  4:50 PM   Result Value Ref Range    Sodium 132 (L) 135 - 145 mmol/L    Potassium 3.8 3.6 - 5.5 mmol/L    Chloride 96 96 - 112 mmol/L    Co2 22 20 - 33 mmol/L    Glucose 132 (H) 65  - 99 mg/dL    Bun 79 (HH) 8 - 22 mg/dL    Creatinine 4.64 (H) 0.50 - 1.40 mg/dL    Calcium 7.3 (L) 8.5 - 10.5 mg/dL    Anion Gap 14.0 (H) 0.0 - 11.9   ESTIMATED GFR    Collection Time: 05/27/17  4:50 PM   Result Value Ref Range    GFR If  12 (A) >60 mL/min/1.73 m 2    GFR If Non African American 10 (A) >60 mL/min/1.73 m 2   CDIFF BY PCR WITH TOXIN    Collection Time: 05/27/17  6:10 PM   Result Value Ref Range    C Diff by PCR POS-SeeToxn (A) Negative    [id number] Presumptive Negative Negative    C.Diff Toxin A&B Negative Negative   OCCULT BLOOD X2 (STOOL)    Collection Time: 05/27/17  6:10 PM   Result Value Ref Range    Occult Blood 1 Positive (A) Negative   CBC with Differential    Collection Time: 05/28/17  5:35 AM   Result Value Ref Range    WBC 4.6 (L) 4.8 - 10.8 K/uL    RBC 3.53 (L) 4.20 - 5.40 M/uL    Hemoglobin 10.3 (L) 12.0 - 16.0 g/dL    Hematocrit 31.5 (L) 37.0 - 47.0 %    MCV 89.2 81.4 - 97.8 fL    MCH 29.2 27.0 - 33.0 pg    MCHC 32.7 (L) 33.6 - 35.0 g/dL    RDW 42.4 35.9 - 50.0 fL    Platelet Count 172 164 - 446 K/uL    MPV 10.9 9.0 - 12.9 fL    Neutrophils-Polys 86.00 (H) 44.00 - 72.00 %    Lymphocytes 10.60 (L) 22.00 - 41.00 %    Monocytes 2.80 0.00 - 13.40 %    Eosinophils 0.00 0.00 - 6.90 %    Basophils 0.40 0.00 - 1.80 %    Immature Granulocytes 0.20 0.00 - 0.90 %    Nucleated RBC 0.00 /100 WBC    Neutrophils (Absolute) 3.98 2.00 - 7.15 K/uL    Lymphs (Absolute) 0.49 (L) 1.00 - 4.80 K/uL    Monos (Absolute) 0.13 0.00 - 0.85 K/uL    Eos (Absolute) 0.00 0.00 - 0.51 K/uL    Baso (Absolute) 0.02 0.00 - 0.12 K/uL    Immature Granulocytes (abs) 0.01 0.00 - 0.11 K/uL    NRBC (Absolute) 0.00 K/uL   Comp Metabolic Panel (CMP)    Collection Time: 05/28/17  5:35 AM   Result Value Ref Range    Sodium 136 135 - 145 mmol/L    Potassium 3.7 3.6 - 5.5 mmol/L    Chloride 100 96 - 112 mmol/L    Co2 20 20 - 33 mmol/L    Anion Gap 16.0 (H) 0.0 - 11.9    Glucose 134 (H) 65 - 99 mg/dL    Bun 60 (H) 8 -  22 mg/dL    Creatinine 4.10 (H) 0.50 - 1.40 mg/dL    Calcium 7.2 (L) 8.5 - 10.5 mg/dL    AST(SGOT) 46 (H) 12 - 45 U/L    ALT(SGPT) 38 2 - 50 U/L    Alkaline Phosphatase 179 (H) 30 - 99 U/L    Total Bilirubin 0.7 0.1 - 1.5 mg/dL    Albumin 3.1 (L) 3.2 - 4.9 g/dL    Total Protein 5.7 (L) 6.0 - 8.2 g/dL    Globulin 2.6 1.9 - 3.5 g/dL    A-G Ratio 1.2 g/dL   ESTIMATED GFR    Collection Time: 05/28/17  5:35 AM   Result Value Ref Range    GFR If  14 (A) >60 mL/min/1.73 m 2    GFR If Non  12 (A) >60 mL/min/1.73 m 2     Medical Decision Making, by Problem:  Active Hospital Problems    Diagnosis   • Septic shock (CMS-[facility]) [A41.9, R65.21]     Plan:  51-year-old female, consulted by Dr. Ibrahim yesterday for worsening chronic abdominal pain, diarrhea, on multiple immunosuppressive medication. Pt is alert and oriented, NAD. Tolerating ice chips. +Flatus, no diarrhea. Abdomen is soft, nondistended, mildly tender in epigastric region without rebound/guarding. VS improved stable. Labs reviewed, improving. Abd pain and tenderness improved. Continue PPI. Will continue to monitor. Discussed with Dr. Ibrahim.      Quality Measures:  Labs reviewed  Kovacs catheter: Critically Ill - Requiring Accurate Measurement of Urinary Output  Central line in place: Need for access    DVT Prophylaxis: Heparin  DVT prophylaxis - mechanical: SCDs  Ulcer prophylaxis: Yes          Discussed patient condition with Patient and Dr. Ibrahim

## 2017-05-28 NOTE — PROGRESS NOTES
Nephrology Progress Note, Adult, Complex               Author: Dea Candi Date & Time created: 5/28/2017  9:52 AM     Interval History:  52 y/o female with RENEE secondary to volume depletion, sepsis  Hx/of SLE  Doing well   Abdominal pain improving  No N/V/D  Creat improving  Good UOP    Review of Systems:  Review of Systems   Constitutional: Positive for malaise/fatigue. Negative for fever and chills.   HENT: Negative.  Negative for congestion, nosebleeds and sore throat.    Eyes: Negative.    Respiratory: Negative.  Negative for cough, hemoptysis, shortness of breath and wheezing.    Cardiovascular: Negative.  Negative for chest pain, palpitations, orthopnea and leg swelling.   Gastrointestinal: Positive for abdominal pain. Negative for nausea, vomiting and diarrhea.   Genitourinary:        Kovacs catheter   Musculoskeletal: Negative for myalgias, back pain and joint pain.   Skin: Negative.  Negative for itching and rash.   Neurological: Negative for dizziness, sensory change, focal weakness and headaches.   All other systems reviewed and are negative.      Physical Exam:  Physical Exam   Constitutional: She is oriented to person, place, and time. She appears well-developed and well-nourished. No distress.   HENT:   Head: Normocephalic and atraumatic.   Mouth/Throat: Oropharynx is clear and moist. No oropharyngeal exudate.   Eyes: Conjunctivae and EOM are normal. Pupils are equal, round, and reactive to light. No scleral icterus.   Neck: Normal range of motion. Neck supple. No thyromegaly present.   Cardiovascular: Normal rate and regular rhythm.  Exam reveals no gallop and no friction rub.    Pulmonary/Chest: Effort normal and breath sounds normal. No respiratory distress. She has no wheezes. She has no rales.   Abdominal: Soft. Bowel sounds are normal. She exhibits no distension.   Mild TTP in epigastric area   Musculoskeletal: She exhibits no edema.   Lymphadenopathy:     She has no cervical adenopathy.    Neurological: She is alert and oriented to person, place, and time. No cranial nerve deficit.   Skin: Skin is warm. No rash noted. No erythema.   Nursing note and vitals reviewed.      Labs:  Recent Labs      17   1006   [id number]  7.36*   [id number]  26.2   [id number]  105.7*   [id number]  96.5   ARTHCO3  15*   ARTBE  -9*     Recent Labs      17   0949   CPKTOTAL  145     Recent Labs      170  17   16517   0535   SODIUM  131*  132*  136   POTASSIUM  3.9  3.8  3.7   CHLORIDE  96  96  100   CO2  17*  22  20   BUN  79*  79*  60*   CREATININE  4.96*  4.64*  4.10*   PHOSPHORUS  6.5*   --    --    CALCIUM  6.6*  7.3*  7.2*     Recent Labs      17   0623  17   0949  17   0535   ALTSGPT  47   --   42   --   38   ASTSGOT  68*   --   53*   --   46*   ALKPHOSPHAT  194*   --   184*   --   179*   TBILIRUBIN  0.7   --   0.7   --   0.7   LIPASE   --   49   --    --    --    GLUCOSE  114*   --   91  132*  134*     Recent Labs      17   0535   RBC  3.82*  3.45*  3.53*   HEMOGLOBIN  11.2*  10.2*  10.3*   HEMATOCRIT  34.0*  30.4*  31.5*   PLATELETCT  309  175  172   PROTHROMBTM   --   14.3   --    APTT   --   30.3   --    INR   --   1.08   --      Recent Labs      17   0535   WBC  16.9*  11.4*  4.6*   NEUTSPOLYS  85.10*  75.30*  86.00*   LYMPHOCYTES  9.60*  16.20*  10.60*   MONOCYTES  4.50  7.10  2.80   EOSINOPHILS  0.00  0.60  0.00   BASOPHILS  0.50  0.50  0.40   ASTSGOT  68*  53*  46*   ALTSGPT  47  42  38   ALKPHOSPHAT  194*  184*  179*   TBILIRUBIN  0.7  0.7  0.7           Hemodynamics:  Temp (24hrs), Av.9 °C (98.4 °F), Min:36.7 °C (98.1 °F), Max:37.3 °C (99.1 °F)  Temperature: 36.7 °C (98.1 °F)  Pulse  Av.5  Min: 56  Max: 128Heart Rate (Monitored): 61  NIBP: 157/81 mmHg  CVP (mm Hg): (!) 9 MM HG  Respiratory:    Respiration: 20, Pulse Oximetry: 97 %         RUL Breath Sounds: Diminished, RML Breath Sounds: Diminished, RLL Breath Sounds: Diminished, ARNOL Breath Sounds: Diminished, LLL Breath Sounds: Diminished  Fluids:    Intake/Output Summary (Last 24 hours) at 05/28/17 0952  Last data filed at 05/28/17 0600   Gross per 24 hour   Intake 9775.47 ml   Output   3390 ml   Net 6385.47 ml     Weight: 79.4 kg (175 lb 0.7 oz)  GI/Nutrition:  Orders Placed This Encounter   Procedures   • Diet NPO     Standing Status: Standing      Number of Occurrences: 1      Standing Expiration Date:      Order Specific Question:  Restrict to:     Answer:  Ice Chips [2]     Medical Decision Making, by Problem:  Active Hospital Problems    Diagnosis   • Septic shock (CMS-[facility]) [A41.9, R65.21]       Labs reviewed, Medications reviewed and Radiology images reviewed                    Assessment and Plan    1.RENEE improving , good UOP  2.Hypotension improved  3.Electrolytes: well controlled.  4.Anemia: Hb stable  5.Sepsis: clinically improved  6.Volume:IVF    Recs: continue current treatment             Daily BMP             Avoid nephrotoxic agents             No need for dialysis

## 2017-05-28 NOTE — PROGRESS NOTES
Kelsie from lab called, patient is positive for C. Diff.  Patient is already in contact isolation precautions.  Nurse to update MD.

## 2017-05-29 LAB
ALBUMIN SERPL BCP-MCNC: 2.8 G/DL (ref 3.2–4.9)
ALBUMIN/GLOB SERPL: 1.2 G/DL
ALP SERPL-CCNC: 194 U/L (ref 30–99)
ALT SERPL-CCNC: 29 U/L (ref 2–50)
ANION GAP SERPL CALC-SCNC: 12 MMOL/L (ref 0–11.9)
AST SERPL-CCNC: 46 U/L (ref 12–45)
BACTERIA UR CULT: NORMAL
BILIRUB SERPL-MCNC: 0.5 MG/DL (ref 0.1–1.5)
BUN SERPL-MCNC: 38 MG/DL (ref 8–22)
CALCIUM SERPL-MCNC: 6.7 MG/DL (ref 8.5–10.5)
CHLORIDE SERPL-SCNC: 105 MMOL/L (ref 96–112)
CO2 SERPL-SCNC: 19 MMOL/L (ref 20–33)
CREAT SERPL-MCNC: 2.31 MG/DL (ref 0.5–1.4)
GFR SERPL CREATININE-BSD FRML MDRD: 22 ML/MIN/1.73 M 2
GLOBULIN SER CALC-MCNC: 2.4 G/DL (ref 1.9–3.5)
GLUCOSE SERPL-MCNC: 180 MG/DL (ref 65–99)
POTASSIUM SERPL-SCNC: 3.1 MMOL/L (ref 3.6–5.5)
PROT SERPL-MCNC: 5.2 G/DL (ref 6–8.2)
SIGNIFICANT IND 70042: NORMAL
SITE SITE: NORMAL
SODIUM SERPL-SCNC: 136 MMOL/L (ref 135–145)
SOURCE SOURCE: NORMAL

## 2017-05-29 PROCEDURE — 770021 HCHG ROOM/CARE - ISO PRIVATE

## 2017-05-29 PROCEDURE — 700111 HCHG RX REV CODE 636 W/ 250 OVERRIDE (IP)

## 2017-05-29 PROCEDURE — 700102 HCHG RX REV CODE 250 W/ 637 OVERRIDE(OP): Performed by: INTERNAL MEDICINE

## 2017-05-29 PROCEDURE — 700105 HCHG RX REV CODE 258

## 2017-05-29 PROCEDURE — 700102 HCHG RX REV CODE 250 W/ 637 OVERRIDE(OP): Performed by: COLON & RECTAL SURGERY

## 2017-05-29 PROCEDURE — 700105 HCHG RX REV CODE 258: Performed by: INTERNAL MEDICINE

## 2017-05-29 PROCEDURE — A9270 NON-COVERED ITEM OR SERVICE: HCPCS | Performed by: COLON & RECTAL SURGERY

## 2017-05-29 PROCEDURE — 700111 HCHG RX REV CODE 636 W/ 250 OVERRIDE (IP): Performed by: HOSPITALIST

## 2017-05-29 PROCEDURE — A9270 NON-COVERED ITEM OR SERVICE: HCPCS | Performed by: INTERNAL MEDICINE

## 2017-05-29 PROCEDURE — 80053 COMPREHEN METABOLIC PANEL: CPT

## 2017-05-29 PROCEDURE — 99232 SBSQ HOSP IP/OBS MODERATE 35: CPT | Performed by: INTERNAL MEDICINE

## 2017-05-29 PROCEDURE — 700111 HCHG RX REV CODE 636 W/ 250 OVERRIDE (IP): Performed by: INTERNAL MEDICINE

## 2017-05-29 RX ORDER — POTASSIUM CHLORIDE 20 MEQ/1
20 TABLET, EXTENDED RELEASE ORAL ONCE
Status: COMPLETED | OUTPATIENT
Start: 2017-05-29 | End: 2017-05-29

## 2017-05-29 RX ORDER — POTASSIUM CHLORIDE 1.5 G/1.58G
20 POWDER, FOR SOLUTION ORAL ONCE
Status: COMPLETED | OUTPATIENT
Start: 2017-05-29 | End: 2017-05-29

## 2017-05-29 RX ORDER — PEG-3350, SODIUM SULFATE, SODIUM CHLORIDE, POTASSIUM CHLORIDE, SODIUM ASCORBATE AND ASCORBIC ACID 7.5-2.691G
100 KIT ORAL 2 TIMES DAILY
Status: COMPLETED | OUTPATIENT
Start: 2017-05-30 | End: 2017-05-30

## 2017-05-29 RX ORDER — AMLODIPINE BESYLATE 5 MG/1
5 TABLET ORAL
Status: DISCONTINUED | OUTPATIENT
Start: 2017-05-29 | End: 2017-06-03

## 2017-05-29 RX ADMIN — HYDROCORTISONE SODIUM SUCCINATE 50 MG: 100 INJECTION, POWDER, FOR SOLUTION INTRAMUSCULAR; INTRAVENOUS at 02:14

## 2017-05-29 RX ADMIN — OMEPRAZOLE 20 MG: 20 CAPSULE, DELAYED RELEASE ORAL at 08:18

## 2017-05-29 RX ADMIN — HEPARIN SODIUM 5000 UNITS: 5000 INJECTION, SOLUTION INTRAVENOUS; SUBCUTANEOUS at 13:42

## 2017-05-29 RX ADMIN — VANCOMYCIN HYDROCHLORIDE 125 MG: 10 INJECTION, POWDER, LYOPHILIZED, FOR SOLUTION INTRAVENOUS at 11:32

## 2017-05-29 RX ADMIN — PIPERACILLIN AND TAZOBACTAM 2.25 G: 2; .25 INJECTION, POWDER, LYOPHILIZED, FOR SOLUTION INTRAVENOUS; PARENTERAL at 17:57

## 2017-05-29 RX ADMIN — VANCOMYCIN HYDROCHLORIDE 125 MG: 10 INJECTION, POWDER, LYOPHILIZED, FOR SOLUTION INTRAVENOUS at 02:14

## 2017-05-29 RX ADMIN — AMLODIPINE BESYLATE 5 MG: 5 TABLET ORAL at 11:32

## 2017-05-29 RX ADMIN — VANCOMYCIN HYDROCHLORIDE 125 MG: 10 INJECTION, POWDER, LYOPHILIZED, FOR SOLUTION INTRAVENOUS at 04:59

## 2017-05-29 RX ADMIN — HYDROCORTISONE SODIUM SUCCINATE 50 MG: 100 INJECTION, POWDER, FOR SOLUTION INTRAMUSCULAR; INTRAVENOUS at 11:33

## 2017-05-29 RX ADMIN — HEPARIN SODIUM 5000 UNITS: 5000 INJECTION, SOLUTION INTRAVENOUS; SUBCUTANEOUS at 21:02

## 2017-05-29 RX ADMIN — SODIUM CHLORIDE: 9 INJECTION, SOLUTION INTRAVENOUS at 13:43

## 2017-05-29 RX ADMIN — PIPERACILLIN AND TAZOBACTAM 2.25 G: 2; .25 INJECTION, POWDER, LYOPHILIZED, FOR SOLUTION INTRAVENOUS; PARENTERAL at 05:00

## 2017-05-29 RX ADMIN — OMEPRAZOLE 20 MG: 20 CAPSULE, DELAYED RELEASE ORAL at 20:41

## 2017-05-29 RX ADMIN — PIPERACILLIN AND TAZOBACTAM 2.25 G: 2; .25 INJECTION, POWDER, LYOPHILIZED, FOR SOLUTION INTRAVENOUS; PARENTERAL at 13:42

## 2017-05-29 RX ADMIN — HEPARIN SODIUM 5000 UNITS: 5000 INJECTION, SOLUTION INTRAVENOUS; SUBCUTANEOUS at 05:00

## 2017-05-29 RX ADMIN — HYDROCORTISONE SODIUM SUCCINATE 50 MG: 100 INJECTION, POWDER, FOR SOLUTION INTRAMUSCULAR; INTRAVENOUS at 17:50

## 2017-05-29 RX ADMIN — HYDROCORTISONE SODIUM SUCCINATE 50 MG: 100 INJECTION, POWDER, FOR SOLUTION INTRAMUSCULAR; INTRAVENOUS at 05:00

## 2017-05-29 RX ADMIN — POTASSIUM CHLORIDE 20 MEQ: 1500 TABLET, EXTENDED RELEASE ORAL at 11:32

## 2017-05-29 ASSESSMENT — PAIN SCALES - GENERAL
PAINLEVEL_OUTOF10: 0

## 2017-05-29 ASSESSMENT — ENCOUNTER SYMPTOMS
FEVER: 0
CHILLS: 0
PALPITATIONS: 0

## 2017-05-29 NOTE — PROGRESS NOTES
[facility] Progress Note      Admit Date: 5/27/2017  ICU Day: 3    Resident(s): Gurpreet Braun V  Attending: MARCOS DOYLE/ Dr. Weaver    Date & Time:   5/29/2017   12:06 PM       Patient ID:    Name:             Brandi Leggett   YOB: 1965  Age:                 51 y.o.  female   MRN:               [id number]    Diagnosis:  Septic shock from ?abd source source with multiorgan failure - resolved   Abdominal pain, nausea, vomiting - improved  C diff colitis  Severe anion gap metabolic acidosis /lactic  - improving  Non oliguric acute kidney injury  - improving  Rheumatoid arthritis    SLE  H/o alcohol abuse (quit 3/2017)  Hepatic steatosis    Elevated transaminase     HPI:    Ms Leggett is a 51 yrs old female with PMH of SLE, rheumatoid arthritis, recurrent pancreatitis, alcohol abuse (quit 3/2017), was transferred from [facility] for septic shock, with lactic acid at 6. She was on levophed through peripheral IV line, s/p empiric antibiotics, IVF.    Patient complains of abdominal pain, nausea, vomiting, loose watery stools for the last 3 days, she was unable to keep anything down. She ran out of medications for the last 2 weeks, however states that she took 15 mg prednisone (home dose) yesterday morning.    Denies blood in stool, urine, chest pain, palpitations, focal weakness.    States that she was diagnosed with C diff on 1/2017.     Consultants:    PMA:   Surgery  GI    Interval Events:    No acute events overnight  Number of bowel movements decreased.  Continues to have intermittent abdominal pain, but significantly decreased compared to before.  Tolerating full liquid diet    PHYSICAL EXAM  Gen: NAD  HEENT: NC/AT, no scleral icterus, no conjunctival injection, mucous membranes pink and moist.  Neck: Supple, no lymphadenopathy.  Cardiac: S1S2, RRR, no m/r/g, no JVD.  Respiratory: Decreased breath sounds bilateral  Abdomen: BS+, soft, NT/ND, no rebound/guarding, no palpable  organomegaly.  Ext: No edema, 2+ DP pulses b/l.  Skin: Warm, dry. No rashes or erythema.   Neuro: AAOx4, no focal sensory or motor deficits.   Psych: Affect, mood, judgement normal.    Respiratory:     Respiration: 20, Pulse Oximetry: 95 %    Chest Tube Drains:    Recent Labs      05/27/17   1006   ILWVW20L  7.36*   SYASTF104I  26.2   MZOCI485Z  105.7*   AJMR6BKA  96.5   ARTHCO3  15*   ARTBE  -9*       HemoDynamics:  Pulse: (!) 57, Heart Rate (Monitored): 71 NIBP: 133/76 mmHg CVP (mm Hg): 4 MM HG    Neuro:      Fluids:    Date 05/29/17 0700 - 05/30/17 0659   Shift 1027-3763 0959-8831 7032-1902 24 Hour Total   I  N  T  A  K  E   P.O. 600   600      P.O. 600   600    I.V. 600   600      IV Volume (NS) 600   600    Shift Total 1200   1200   O  U  T  P  U  T   Urine 250   250      Number of Times Voided 1 x   1 x      Void (ml) 250   250    Stool          Number of Times Stooled 1 x   1 x    Shift Total 250   250      950        Intake/Output Summary (Last 24 hours) at 05/29/17 1206  Last data filed at 05/29/17 1200   Gross per 24 hour   Intake   2945 ml   Output    500 ml   Net   2445 ml          Body mass index is 31.02 kg/(m^2).    Recent Labs      05/27/17   1330  05/27/17   1650  05/28/17   0535  05/29/17   0504   SODIUM  131*  132*  136  136   POTASSIUM  3.9  3.8  3.7  3.1*   CHLORIDE  96  96  100  105   CO2  17*  22  20  19*   BUN  79*  79*  60*  38*   CREATININE  4.96*  4.64*  4.10*  2.31*   PHOSPHORUS  6.5*   --    --    --    CALCIUM  6.6*  7.3*  7.2*  6.7*       GI/Nutrition:  Recent Labs      05/27/17   0949  05/27/17   1330  05/27/17   1650  05/28/17   0535  05/29/17   0504   ALTSGPT   --   42   --   38  29   ASTSGOT   --   53*   --   46*  46*   ALKPHOSPHAT   --   184*   --   179*  194*   TBILIRUBIN   --   0.7   --   0.7  0.5   LIPASE  49   --    --    --    --    GLUCOSE   --   91  132*  134*  180*       Heme:  Recent Labs      05/27/17   0623  05/27/17   1330  05/28/17   0535   RBC  3.82*  3.45*   "3.53*   HEMOGLOBIN  11.2*  10.2*  10.3*   HEMATOCRIT  34.0*  30.4*  31.5*   PLATELETCT  309  175  172   PROTHROMBTM   --   14.3   --    APTT   --   30.3   --    INR   --   1.08   --        Infectious Disease:  Temp  Av.5 °C (97.7 °F)  Min: 36.3 °C (97.3 °F)  Max: 36.7 °C (98.1 °F)  Recent Labs      17   0623  17   1330  17   0535  17   0504   WBC  16.9*  11.4*  4.6*   --    NEUTSPOLYS  85.10*  75.30*  86.00*   --    LYMPHOCYTES  9.60*  16.20*  10.60*   --    MONOCYTES  4.50  7.10  2.80   --    EOSINOPHILS  0.00  0.60  0.00   --    BASOPHILS  0.50  0.50  0.40   --    ASTSGOT  68*  53*  46*  46*   ALTSGPT  47  42  38  29   ALKPHOSPHAT  194*  184*  179*  194*   TBILIRUBIN  0.7  0.7  0.7  0.5       Meds:  • amlodipine  5 mg     • piperacillin-tazobactam  2.25 g Stopped (17 0645)   • NS  30 mL/kg     • NS  1,000 mL     • NS   100 mL/hr at 17 1551   • Respiratory Care per Protocol       • acetaminophen  650 mg     • promethazine  25 mg     • fentaNYL  25 mcg     • oxycodone immediate-release  5 mg      Or   • oxycodone immediate-release  10 mg     • ondansetron  4 mg     • vancomycin 50 mg/mL  125 mg     • heparin  5,000 Units     • omeprazole  20 mg     • hydrocortisone sodium succinate PF  50 mg     • prochlorperazine  10 mg          Problem and Plan:    Septic shock from ?abd source source with multiorgan failure - resolved   Abdominal pain, nausea, vomiting - improved  C diff colitis  FOBT positive  - presented with abdominal pain with nausea, vomiting, watery loose stools for 3 days  , hypotension, WBC elevation  - Cdiff PCR+, toxin AB NGT ; - h/o C -diff colitis 2017. On vanco po  - CT abd/pelvis at outside facility without contrast negative for intraabdominal pathology    - USG shows \"distended gall bladder, No evidence of gallstone or biliary ductal dilatation, Enlarged echogenic liver consistent with fatty change versus hepatocellular dysfunction\".    -was treated with  " Septic protocol,  Empiric IV zosyn, IV vanco,  on oral vanco for C diff  ; IV vanco dc'd   - received vitamin C, thiamine, solu cortef    - off levophed, VSS  - Dr Ibrahim, surgery consulted for possible early peritonitis  - conservative mgm  - consulted with GI ,  awaiting recs  - continue liquid diet. Advace diet if abdominal pain continues to improve    Severe anion gap metabolic acidosis /lactic  - improving  - secondary to above and RENEE    Non oliguric acute kidney injury  - improving  - FeNa consistent with intrinsic renal failure  ?ATN  - never diagnosed with lupus nephritis  ; no hx CKD  - improving with IVF  - on llanos catheter    - USG renal within normal limits    - continue  IVF   - nephro on board, appreciate recs    Hypertension  - hold off on lisinopril due to RENEE  - started on amlodipine    Hypokalemia  - replete     Rheumatoid arthritis    SLE  - home dose on methotrexate, humira, prednisone    - immune system and possibly adranal glands chronic supression    Tophaceous gout    - on allopurinol    H/o alcohol abuse (quit 3/2017)  Hepatic steatosis    Elevated transaminase   - Maddrey score 11.3    - quit alcohol since her father passed away on 3/2017     Normocytic anemia   - H&H stable  - FOBT : positive  - GI consulted    Hypovolemic hyponatremia    - resolved    Cervical DJD   - evaluated by Neurosurgery awaiting surgery     Hyperlipidemia   - held home gemfibrozil, pravastatin, restrt once LFT stable    H/o hematuria   - ? Due to methotrexate use    - f/u Urology     DISPO: Transfer to medical floor    CODE STATUS: Full code    Quality Measures:  Llanos Catheter:  DVT Prophylaxis: heparin  Ulcer Prophylaxis:  Antibiotics: zosyn, vanco  Lines:    Imaging:  US-ABDOMEN COMPLETE SURVEY   Final Result      1.  Distended gallbladder. No evidence of gallstone or biliary ductal dilatation.      2.  Enlarged echogenic liver consistent with fatty change versus hepatocellular dysfunction.       DX-CHEST-PORTABLE (1 VIEW)   Final Result      Interval placement of a right IJ central line with the catheter tip overlying the cavoatrial junction.      OUTSIDE IMAGES-CT ABDOMEN /PELVIS   Preliminary Result      OUTSIDE IMAGES-DX CHEST   Preliminary Result

## 2017-05-29 NOTE — CONSULTS
DATE OF SERVICE:  05/29/2017    TITLE:  Gastroenterology consultation    REQUESTED BY:  Dr. Weaver.    REASON FOR CONSULTATION:  Assistance in management of diarrhea, nausea,   vomiting, and sepsis.    IDENTIFICATION:  A 51-year-old  female.    CHIEF COMPLAINT:  Diarrhea.    HISTORY OF PRESENT ILLNESS:  History was obtained via interview of the patient   and review of [facility] records as well as gastroenterology consultant's   records.  The case was discussed with Dr. Weaver and the internal medicine   resident.  The patient is followed by my colleague, Dr. Boone for history of   elevated liver enzymes and fatty liver.  She was most recently seen in March.    At that time, her AST was 140, ALT 98.  She had been drinking alcohol   routinely.  The patient is actually scheduled for a liver biopsy in the   outpatient setting on June 9th.  Review of her labs here shows that her AST is   46 and ALT is normalized at 29.  Apparently, she quit drinking alcohol in   March.  She also has a history of lupus and rheumatoid arthritis per the   patient.  She had previously been on methotrexate and is no longer on this   medication.  Imaging suggests fatty liver.  She was admitted here with about 4   days of nausea, vomiting, and diarrhea which led to presentation at the   outside hospital.  Apparently, she was septic upon admission.  C. diff PCR was   positive, but toxin negative per her side with infectious disease specialist   felt this is consistent with colonization.  There is no need for antibiotics.    She has been on antibiotics here for 2 days in the form of vancomycin as well   as Zosyn.  I do not see that stool was sent for culture here.  She has also   had some streaking blood on stool here and is Hemoccult positive.  Her   hemoglobin is 10, MCV 89.  I do not see that iron studies have been obtained   here.  Her ferritin was elevated during Dr. Boone's workup of elevated liver   enzymes at 680.  Her  antimitochondrial antibody, YOANNA, anti-actin antibody,   hepatitis C virus serologies, hepatitis C virus serologies, and alpha 1   antitrypsin were negative.    Due to the patient's elevated lactate and presentation overall ultrasound   imaging was also obtained, which showed a distended gallbladder and an   enlarged fatty liver.  Surgery was consulted and Dr. Ibrahim saw the patient on   May 27th.  At this point, he felt like there is no urgent indication for   surgical intervention.    ALLERGIES:  CODEINE.    MEDICATIONS:  Current inpatient medications include amlodipine, mini dose   heparin, hydrocortisone, omeprazole, Zosyn, and vancomycin.    PAST MEDICAL HISTORY:  1.  Obesity.  2.  Elevated liver tests.  3.  Alcohol abuse.  4.  GERD.  5.  Hypertension.  6.  Rheumatoid arthritis.  7.  Lupus.  8.  Clostridium difficile.    PAST SURGICAL HISTORY:  None.    SOCIAL HISTORY:  Prior alcohol, none recently.  No tobacco.  No drugs.    FAMILY HISTORY:  Noncontributory.  Specifically, no luminal GI disease, liver   disease, or pancreatic disease.    REVIEW OF SYSTEMS:  A 14-point review of systems is negative except as noted   above.  See the HPI.    PHYSICAL EXAMINATION:  GENERAL:  No immediate distress, friendly, cooperative,  female,   obese.  VITAL SIGNS:  Afebrile, blood pressure 133/76, heart rate 57, and oxygen   saturation 95% on room air.  HEENT:  Normocephalic and atraumatic.  Sclerae are anicteric.  Conjunctivae   pink.  Oropharynx -- moist and clear without lesions.  NECK:  No thyroid abnormality or lymphadenopathy.  LUNGS:  Clear to auscultation without wheezes, rales, or rhonchi.  CARDIOVASCULAR:  Regular rate and rhythm without murmurs.  ABDOMEN:  Obese.  Bowel sounds present, soft, nontender, nondistended.  EXTREMITIES:  No clubbing, cyanosis, or edema.  SKIN:  No jaundice, spider angiomata, or rashes noted.  NEUROLOGICAL:  Grossly nonfocal.  Alert and oriented, moves all 4 extremities.    Sensation  to light touch intact diffusely.  PSYCHIATRIC:  Affect seems appropriate.    IMAGING STUDIES:  See HPI.    IMPRESSION:  A 51-year-old female with history of lupus and rheumatoid   arthritis who is immune compromised.  She presents with nausea, vomiting, and   diarrhea.  Overall, I suspect she had an infection.  The patient does report   her  had similar symptoms, bacterial versus viral infectious process to   be most likely.  I do think her vancomycin is unneeded at this time as her   Clostridium difficile is likely a colonization.    For Hemoccult positivity could be due to a number of reasons, I would like to   conduct esophagogastroduodenoscopy and colonoscopy prior to her discharge from   hospital.    Her liver enzyme elevation is mild and improved likely secondary to alcohol.    She may also have a component of nonalcohol related fatty liver disease.  I am   not convinced she needs a biopsy at this time given the improvement.    PROBLEMS:  1.  Abdominal pain -- improved.  2.  Nausea and vomiting -- improved.  3.  Diarrhea -- improved, but somewhat persistent.  4.  Hematochezia/hemoccult positivity.  5.  Clostridium difficile colonization.  6.  Rheumatoid arthritis.  7.  Lupus.  8.  Increased aminotransferases with fatty liver.  9.  History of alcohol use.  10.  Obesity.    PLAN AND RECOMMENDATIONS:  1.  Check labs to include iron, TIBC, ferritin, vitamin B12, folate for workup   of anemia.  2.  Fractionate alkaline phosphatase as this is mildly elevated.  3.  Serial labs to include hepatic function panel as well as creatinine.  Her   creatinine had been elevated 2.31 today, it was 4.10 yesterday.  4.  Stop vancomycin now.  5.  EGD and colonoscopy later.  I will place the patient on a clear liquid   diet tomorrow in preparation for possible procedures on Wednesday.  Due to her   comorbid conditions, I would likely ask for anesthesiologist assistance.    Thank you for allowing me to participate in the care  of this patient.       ____________________________________     MD LYNN GOODSON / VICTOR HUGO    DD:  05/29/2017 13:22:32  DT:  05/29/2017 13:56:04    D#:  [id number]  Job#:  [id number]    cc: ARAM SOLORIO MD, GABO SCHAEFER MD

## 2017-05-29 NOTE — PROGRESS NOTES
TRANSFER SUMMARY:    This is a 51 yrs old female with PMH of SLE, rheumatoid arthritis, recurrent pancreatitis, alcohol abuse (quit 3/2017), was transferred from [facility] for septic shock, with lactic acid at 6. She was on levophed. She received empiric antibiotics, IVF. Patient was having abdominal pain, nausea, vomiting, loose watery stools for 3 days. She denied blood in stool, urine, chest pain, palpitations, focal weakness. She was diagnosed with C diff on 1/2017. Surgery was consulted, recommended conservative management. Patient was started on sepsis protocol, vancomycin, zosyn.  Patient had non-oliguric renal failure with severe anion gap metabolic acidosis. Nephrology was consulted, started on bicarb drip. C. Diff was positive, she was on vancomycin. IV vancomycin was discontinued on 5/28. Off of levophed. Metabolic acidosis resolved. BP stable. Creatinine slowly improving. Lisinopril(home med) held. GI was consulted, consult pending.    Follow up:  GI, Nephrology, Surgery recommendations  Adjustment of BP meds  Monitor CBC, Electrolytes, renal function  Diet as tolerated  PT/OT  Restart home meds as appropriate

## 2017-05-29 NOTE — PROGRESS NOTES
Pulmonary Critical Care Progress Note        Date of Service: 5/29/17  Chief Complaint: N/V/D and septic shock    History of Present Illness: 51 y.o. female admitted for N/V/D and abdominal pain     ROS:  Respiratory: negative, Cardiac: negative, GI: positive abdominal pain and positive diarrhea.  All other systems negative.    Interval Events:  24 hour interval history reviewed    - less abdominal pain   - less diarrhea   - still feeling weak   - no hematochezia      Yesterday's Events:  Tmax 98.1  +5.5L  over the last 24 hours, +5.5 since admit.  CXR shows: none today.   Awake, alert and oriented.   SR,  systolic.   Diarrhea is improved. No stool overnight or this AM.     PFSH:  No change.      Physical Exam  General:  Pleasant, sitting up. Comfortable appearing.   Neuro/Psych: Alert and oriented. Talkative.   HEENT: PERRL.   CVS: Heart rate normal.   Respiratory: Clear to auscultation bilaterally.   Abdomen/:Soft, mild tenderness in the epigastric region and right upper quadrant with no rebound or guarding.   Extremities: No edema     Respiratory:     Pulse Oximetry: 98 %    Recent Labs      05/27/17   1006   JYSTH10U  7.36*   ACYWEG385V  26.2   KAKRI338D  105.7*   AIOY2ITF  96.5   ARTHCO3  15*   ARTBE  -9*       HemoDynamics:  Pulse: 93, Heart Rate (Monitored): 71  NIBP: 157/81 mmHg  CVP (mm Hg): 4 MM HG    Imaging: Available data reviewed  Recent Labs      05/27/17   0949   CPKTOTAL  145       Neuro:  GCS Total [place] Coma Score: 15  Imaging: Available data reviewed    Fluids:  Intake/Output       05/27/17 0700 - 05/28/17 0659 05/28/17 0700 - 05/29/17 0659 05/29/17 0700 - 05/30/17 0659      3181-4675 9719-8192 Total 0464-0271 8405-9841 Total 5015-5511 0644-4156 Total       Intake    P.O.  60  150 210  1150  150 1300  --  -- --    P.O. 60  150 1300 -- -- --    I.V.  7575.5  1990 9565.5  1755  200 1955  --  -- --    Norepinephrine Volume 25.5 -- 25.5 -- -- -- -- -- --    IV Volume (NS) 1485  1650 7400 4080 045 5274 -- -- --    IV Volume (TKO) 80 120 200 80 -- 80 -- -- --    IV Volume (Bicarb gtt) 1050 -- 1050 -- -- -- -- -- --    IV Piggyback Volume (IV Piggyback) 670 220 890 300 -- 300 -- -- --    Total Intake 7635.5 2140 9775.5 2905 350 3255 -- -- --       Output    Urine  21000 4290  1050  -- 1050  --  -- --    Number of Times Voided -- -- -- 3 x 1 x 4 x -- -- --    Indwelling Cathether 21000 800 -- 800 -- -- --    Void (ml) -- -- -- 250 -- 250 -- -- --    Stool  --  -- --  --  -- --  --  -- --    Number of Times Stooled 1 x -- 1 x 4 x 1 x 5 x -- -- --    Total Output 21000 4290 1050 -- 1050 -- -- --       Net I/O     5535.5 -50 5485.5 6360 620 4121 -- -- --           Recent Labs      17   1330  17   16517   0535   SODIUM  131*  132*  136   POTASSIUM  3.9  3.8  3.7   CHLORIDE  96  96  100   CO2  17*  22  20   BUN  79*  79*  60*   CREATININE  4.96*  4.64*  4.10*   PHOSPHORUS  6.5*   --    --    CALCIUM  6.6*  7.3*  7.2*       GI/Nutrition:    Imaging: Available data reviewed  taking PO  Liver Function  Recent Labs      17   0617   0949  17   1330  17   16517   0535   ALTSGPT  47   --   42   --   38   ASTSGOT  68*   --   53*   --   46*   ALKPHOSPHAT  194*   --   184*   --   179*   TBILIRUBIN  0.7   --   0.7   --   0.7   LIPASE   --   49   --    --    --    GLUCOSE  114*   --   91  132*  134*       Heme:  Recent Labs      17   1330  17   0535   RBC  3.82*  3.45*  3.53*   HEMOGLOBIN  11.2*  10.2*  10.3*   HEMATOCRIT  34.0*  30.4*  31.5*   PLATELETCT  309  175  172   PROTHROMBTM   --   14.3   --    APTT   --   30.3   --    INR   --   1.08   --        Infectious Disease:  Temp  Av.6 °C (97.9 °F)  Min: 36.3 °C (97.3 °F)  Max: 36.8 °C (98.3 °F)  Micro: antibiotics reviewed, cultures pending and cultures reviewed  Recent Labs      17   0623  170  17   0535   WBC  16.9*  11.4*   4.6*   NEUTSPOLYS  85.10*  75.30*  86.00*   LYMPHOCYTES  9.60*  16.20*  10.60*   MONOCYTES  4.50  7.10  2.80   EOSINOPHILS  0.00  0.60  0.00   BASOPHILS  0.50  0.50  0.40   ASTSGOT  68*  53*  46*   ALTSGPT  47  42  38   ALKPHOSPHAT  194*  184*  179*   TBILIRUBIN  0.7  0.7  0.7     Current Facility-Administered Medications   Medication Dose Frequency Provider Last Rate Last Dose   • piperacillin-tazobactam (ZOSYN) 2.25 g in  mL IVPB  2.25 g Q8HRS Garry Miller M.D. 200 mL/hr at 05/29/17 0500 2.25 g at 05/29/17 0500   • NS infusion 2,178 mL  30 mL/kg Once PRN Rosemary Scherer M.D.       • NS (BOLUS) infusion 1,000 mL  1,000 mL Once PRN Rosemary Scherer M.D.       • NS infusion   Continuous Kevin Armendariz M.D. 100 mL/hr at 05/28/17 1551     • Respiratory Care per Protocol   Continuous RT Rosemary Scherer M.D.       • acetaminophen (TYLENOL) tablet 650 mg  650 mg Q6HRS PRN Rosemary Scherer M.D.       • promethazine (PHENERGAN) tablet 25 mg  25 mg Q6HRS PRN Rosemary Scherer M.D.       • fentaNYL (SUBLIMAZE) injection 25 mcg  25 mcg Q HOUR PRN Kevin Armendariz M.D.   25 mcg at 05/27/17 1131   • oxycodone immediate-release (ROXICODONE) tablet 5 mg  5 mg Q4HRS PRN Kevin Armendariz M.D.   5 mg at 05/27/17 1815    Or   • oxycodone immediate release (ROXICODONE) tablet 10 mg  10 mg Q4HRS PRN Kevin Armendariz M.D.       • ondansetron (ZOFRAN) syringe/vial injection 4 mg  4 mg Q4HRS PRN Kevin Armendariz M.D.   4 mg at 05/27/17 1222   • vancomycin 50 mg/mL oral soln 125 mg  125 mg Q6HRS Garry Miller M.D.   125 mg at 05/29/17 0459   • heparin injection 5,000 Units  5,000 Units Q8HRS Rosemary Scherer M.D.   5,000 Units at 05/29/17 0500   • omeprazole (PRILOSEC) capsule 20 mg  20 mg BID He Ibrahim M.D.   20 mg at 05/28/17 2054   • hydrocortisone sodium succinate PF (SOLU-CORTEF) 100 MG injection 50 mg  50 mg Q6HRS Kevin Armendariz M.D.   50 mg at 05/29/17 0500   • prochlorperazine (COMPAZINE) injection 10  mg  10 mg Q6HRS PRN Kevin Armendariz M.D.   10 mg at 05/27/17 2109     Last reviewed on 5/27/2017  9:49 AM by Modesta Mirza, Pharmacy Int    Quality  Measures:  Medications reviewed, Labs reviewed, Radiology images reviewed and EKG reviewed  Kovacs catheter: No Kovacs      DVT Prophylaxis: Heparin  DVT prophylaxis - mechanical: SCDs  Ulcer prophylaxis: Not indicated  Antibiotics: Treating active infection/contamination beyond 24 hours perioperative coverage        Problems/Plan:  Severe Sepsis with Septic Shock due to an abdominal source   - s/p sepsis protocol   - off pressors   - resolved  Acute Abdominal Pain    - ct abdo osh (-),    -  US with enlarged GG w/o stones  Acute Kidney Injury   - improving creatinine and UOP   - avoiding nephrotoxins   - likely related to ATN  Acute AG Metabolic Acidosis    - resolved  Lactic Acidosis    - resolved  Immunocompromised Host    - chronic steroids, humira, methotrexate, autoimmune disease   - wean steroids to home dose  Lupus   - appears stable  Leukocytosis    - improved  Hyponatremia    - resolved  Elevated liver enzymes    - improving, ct abdo and us reviewed  Hx of pancreatitis  Recent C Diff Colitis   - cont oral vanco    Discussed patient condition and risk of morbidity and/or mortality with RN, RT, Therapies, Pharmacy and [employer] resident.    [id number]

## 2017-05-29 NOTE — CARE PLAN
Problem: Communication  Goal: The ability to communicate needs accurately and effectively will improve  Outcome: PROGRESSING AS EXPECTED  Reinforce need to call for assistance    Problem: Safety  Goal: Will remain free from injury  Outcome: PROGRESSING AS EXPECTED  Reinforce need to have assistance when ever getting out of bed or walking    Problem: Skin Integrity  Goal: Risk for impaired skin integrity will decrease  Outcome: PROGRESSING AS EXPECTED  Reinforce need to turn and move frequently

## 2017-05-29 NOTE — PROGRESS NOTES
Nephrology Progress Note, Adult, Complex               Author: Mario Garcia   Date & Time created: 5/29/2017  11:13 AM     Interval History:  50 y/o female with RENEE secondary to volume depletion, sepsis and a history of SLE. She has been given IVF and has been net positive fluid balance. Urine output has improved and Cr has improved significantly. Still with some dry mouth.    Review of Systems:  Review of Systems   Constitutional: Negative for fever and chills.   Cardiovascular: Negative for chest pain and palpitations.       Physical Exam:  Physical Exam   Constitutional: She is oriented to person, place, and time. She appears well-developed and well-nourished. No distress.   HENT:   Head: Normocephalic and atraumatic.   Mouth/Throat: Oropharynx is clear and moist. No oropharyngeal exudate.   Neck: Normal range of motion. Neck supple.   Cardiovascular: Normal rate and regular rhythm.  Exam reveals no gallop and no friction rub.    Pulmonary/Chest: Effort normal and breath sounds normal.   Musculoskeletal: She exhibits no edema.   Neurological: She is alert and oriented to person, place, and time.   Nursing note and vitals reviewed.      Labs:  Recent Labs      05/27/17   1006   [id number]  7.36*   [id number]  26.2   [id number]  105.7*   [id number]  96.5   ARTHCO3  15*   ARTBE  -9*     Recent Labs      05/27/17   0949   CPKTOTAL  145     Recent Labs      05/27/17   1330  05/27/17   1650  05/28/17   0535  05/29/17   0504   SODIUM  131*  132*  136  136   POTASSIUM  3.9  3.8  3.7  3.1*   CHLORIDE  96  96  100  105   CO2  17*  22  20  19*   BUN  79*  79*  60*  38*   CREATININE  4.96*  4.64*  4.10*  2.31*   PHOSPHORUS  6.5*   --    --    --    CALCIUM  6.6*  7.3*  7.2*  6.7*     Recent Labs      05/27/17   0949  05/27/17   1330  05/27/17   1650  05/28/17   0535  05/29/17   0504   ALTSGPT   --   42   --   38  29   ASTSGOT   --   53*   --   46*  46*   ALKPHOSPHAT   --   184*   --   179*  194*   TBILIRUBIN   --   0.7   --    0.7  0.5   LIPASE  49   --    --    --    --    GLUCOSE   --   91  132*  134*  180*     Recent Labs      17   0623  17   1330  17   0535   RBC  3.82*  3.45*  3.53*   HEMOGLOBIN  11.2*  10.2*  10.3*   HEMATOCRIT  34.0*  30.4*  31.5*   PLATELETCT  309  175  172   PROTHROMBTM   --   14.3   --    APTT   --   30.3   --    INR   --   1.08   --      Recent Labs      17   0623  17   1330  17   0535  17   0504   WBC  16.9*  11.4*  4.6*   --    NEUTSPOLYS  85.10*  75.30*  86.00*   --    LYMPHOCYTES  9.60*  16.20*  10.60*   --    MONOCYTES  4.50  7.10  2.80   --    EOSINOPHILS  0.00  0.60  0.00   --    BASOPHILS  0.50  0.50  0.40   --    ASTSGOT  68*  53*  46*  46*   ALTSGPT  47  42  38  29   ALKPHOSPHAT  194*  184*  179*  194*   TBILIRUBIN  0.7  0.7  0.7  0.5           Hemodynamics:  Temp (24hrs), Av.5 °C (97.7 °F), Min:36.3 °C (97.3 °F), Max:36.7 °C (98.1 °F)  Temperature: 36.4 °C (97.5 °F)  Pulse  Av.4  Min: 56  Max: 128Heart Rate (Monitored): 71  NIBP: (!) 175/94 mmHg  CVP (mm Hg): 4 MM HG  Respiratory:    Respiration: 20, Pulse Oximetry: 96 %        RUL Breath Sounds: Clear, RML Breath Sounds: Diminished, RLL Breath Sounds: Diminished, ARNOL Breath Sounds: Clear, LLL Breath Sounds: Diminished  Fluids:    Intake/Output Summary (Last 24 hours) at 17 1113  Last data filed at 17 0800   Gross per 24 hour   Intake   2615 ml   Output    775 ml   Net   1840 ml        GI/Nutrition:  Orders Placed This Encounter   Procedures   • DIET ORDER     Standing Status: Standing      Number of Occurrences: 1      Standing Expiration Date:      Order Specific Question:  Diet:     Answer:  Full Liquid [11]      Comments:  no milk     Medical Decision Making, by Problem:  Active Hospital Problems    Diagnosis   • Septic shock (CMS-HCC) [A41.9, R65.21]       Labs reviewed, Medications reviewed and Radiology images reviewed                    Assessment and Plan    1.RENEE significantly  improved, Cr down to 2.31, able to take fluids PO well  2.Hypertension, improved from hypotension previously  3.Electrolytes: Hypokalemia, new  4.Anemia: Hb 10.3       -Replete KCl  -Noted txf to floor  -Start low dose Norvasc for HTN  -Will follow

## 2017-05-29 NOTE — CARE PLAN
Problem: Communication  Goal: The ability to communicate needs accurately and effectively will improve  Outcome: PROGRESSING AS EXPECTED  Educating patient about plan of care, procedures, treatments, and medications.  All questions answered.     Problem: Mobility  Goal: Risk for activity intolerance will decrease  Outcome: PROGRESSING AS EXPECTED  Helping patient ambulate to commode. Monitoring for signs of activity intolerance.

## 2017-05-30 LAB
ALBUMIN SERPL BCP-MCNC: 2.9 G/DL (ref 3.2–4.9)
ALBUMIN/GLOB SERPL: 1.1 G/DL
ALP SERPL-CCNC: 262 U/L (ref 30–99)
ALT SERPL-CCNC: 61 U/L (ref 2–50)
ANION GAP SERPL CALC-SCNC: 11 MMOL/L (ref 0–11.9)
AST SERPL-CCNC: 97 U/L (ref 12–45)
BASOPHILS # BLD AUTO: 0.4 % (ref 0–1.8)
BASOPHILS # BLD: 0.01 K/UL (ref 0–0.12)
BILIRUB SERPL-MCNC: 0.6 MG/DL (ref 0.1–1.5)
BUN SERPL-MCNC: 23 MG/DL (ref 8–22)
CALCIUM SERPL-MCNC: 7.3 MG/DL (ref 8.5–10.5)
CHLORIDE SERPL-SCNC: 109 MMOL/L (ref 96–112)
CO2 SERPL-SCNC: 19 MMOL/L (ref 20–33)
CREAT SERPL-MCNC: 1.38 MG/DL (ref 0.5–1.4)
EOSINOPHIL # BLD AUTO: 0 K/UL (ref 0–0.51)
EOSINOPHIL NFR BLD: 0 % (ref 0–6.9)
ERYTHROCYTE [DISTWIDTH] IN BLOOD BY AUTOMATED COUNT: 41.3 FL (ref 35.9–50)
FERRITIN SERPL-MCNC: 816.7 NG/ML (ref 10–291)
FOLATE SERPL-MCNC: >23.7 NG/ML
GFR SERPL CREATININE-BSD FRML MDRD: 41 ML/MIN/1.73 M 2
GGT SERPL-CCNC: 1059 U/L (ref 7–34)
GLOBULIN SER CALC-MCNC: 2.6 G/DL (ref 1.9–3.5)
GLUCOSE SERPL-MCNC: 168 MG/DL (ref 65–99)
HCT VFR BLD AUTO: 34.1 % (ref 37–47)
HGB BLD-MCNC: 11.2 G/DL (ref 12–16)
IMM GRANULOCYTES # BLD AUTO: 0.01 K/UL (ref 0–0.11)
IMM GRANULOCYTES NFR BLD AUTO: 0.4 % (ref 0–0.9)
IRON SATN MFR SERPL: 44 % (ref 15–55)
IRON SERPL-MCNC: 103 UG/DL (ref 40–170)
LYMPHOCYTES # BLD AUTO: 0.69 K/UL (ref 1–4.8)
LYMPHOCYTES NFR BLD: 25.7 % (ref 22–41)
MCH RBC QN AUTO: 29.4 PG (ref 27–33)
MCHC RBC AUTO-ENTMCNC: 32.8 G/DL (ref 33.6–35)
MCV RBC AUTO: 89.5 FL (ref 81.4–97.8)
MONOCYTES # BLD AUTO: 0.13 K/UL (ref 0–0.85)
MONOCYTES NFR BLD AUTO: 4.9 % (ref 0–13.4)
NEUTROPHILS # BLD AUTO: 1.84 K/UL (ref 2–7.15)
NEUTROPHILS NFR BLD: 68.6 % (ref 44–72)
NRBC # BLD AUTO: 0 K/UL
NRBC BLD AUTO-RTO: 0 /100 WBC
PLATELET # BLD AUTO: 127 K/UL (ref 164–446)
PMV BLD AUTO: 10.5 FL (ref 9–12.9)
POTASSIUM SERPL-SCNC: 3.2 MMOL/L (ref 3.6–5.5)
PROT SERPL-MCNC: 5.5 G/DL (ref 6–8.2)
RBC # BLD AUTO: 3.81 M/UL (ref 4.2–5.4)
SODIUM SERPL-SCNC: 139 MMOL/L (ref 135–145)
TIBC SERPL-MCNC: 234 UG/DL (ref 250–450)
VIT B12 SERPL-MCNC: 390 PG/ML (ref 211–911)
WBC # BLD AUTO: 2.7 K/UL (ref 4.8–10.8)

## 2017-05-30 PROCEDURE — G8979 MOBILITY GOAL STATUS: HCPCS | Mod: CH

## 2017-05-30 PROCEDURE — G8989 SELF CARE D/C STATUS: HCPCS | Mod: CI

## 2017-05-30 PROCEDURE — 82728 ASSAY OF FERRITIN: CPT

## 2017-05-30 PROCEDURE — A9270 NON-COVERED ITEM OR SERVICE: HCPCS | Performed by: STUDENT IN AN ORGANIZED HEALTH CARE EDUCATION/TRAINING PROGRAM

## 2017-05-30 PROCEDURE — 700102 HCHG RX REV CODE 250 W/ 637 OVERRIDE(OP): Performed by: STUDENT IN AN ORGANIZED HEALTH CARE EDUCATION/TRAINING PROGRAM

## 2017-05-30 PROCEDURE — 99232 SBSQ HOSP IP/OBS MODERATE 35: CPT | Performed by: INTERNAL MEDICINE

## 2017-05-30 PROCEDURE — 80053 COMPREHEN METABOLIC PANEL: CPT

## 2017-05-30 PROCEDURE — G8987 SELF CARE CURRENT STATUS: HCPCS | Mod: CI

## 2017-05-30 PROCEDURE — 82977 ASSAY OF GGT: CPT

## 2017-05-30 PROCEDURE — 700105 HCHG RX REV CODE 258

## 2017-05-30 PROCEDURE — 700102 HCHG RX REV CODE 250 W/ 637 OVERRIDE(OP): Performed by: COLON & RECTAL SURGERY

## 2017-05-30 PROCEDURE — 700102 HCHG RX REV CODE 250 W/ 637 OVERRIDE(OP): Performed by: INTERNAL MEDICINE

## 2017-05-30 PROCEDURE — 700111 HCHG RX REV CODE 636 W/ 250 OVERRIDE (IP): Performed by: HOSPITALIST

## 2017-05-30 PROCEDURE — 700111 HCHG RX REV CODE 636 W/ 250 OVERRIDE (IP): Performed by: STUDENT IN AN ORGANIZED HEALTH CARE EDUCATION/TRAINING PROGRAM

## 2017-05-30 PROCEDURE — G8978 MOBILITY CURRENT STATUS: HCPCS | Mod: CH

## 2017-05-30 PROCEDURE — A9270 NON-COVERED ITEM OR SERVICE: HCPCS | Performed by: INTERNAL MEDICINE

## 2017-05-30 PROCEDURE — 82607 VITAMIN B-12: CPT

## 2017-05-30 PROCEDURE — 97161 PT EVAL LOW COMPLEX 20 MIN: CPT

## 2017-05-30 PROCEDURE — 83540 ASSAY OF IRON: CPT

## 2017-05-30 PROCEDURE — 82746 ASSAY OF FOLIC ACID SERUM: CPT

## 2017-05-30 PROCEDURE — G8988 SELF CARE GOAL STATUS: HCPCS | Mod: CI

## 2017-05-30 PROCEDURE — 700111 HCHG RX REV CODE 636 W/ 250 OVERRIDE (IP): Performed by: INTERNAL MEDICINE

## 2017-05-30 PROCEDURE — 85025 COMPLETE CBC W/AUTO DIFF WBC: CPT

## 2017-05-30 PROCEDURE — 700111 HCHG RX REV CODE 636 W/ 250 OVERRIDE (IP)

## 2017-05-30 PROCEDURE — 84075 ASSAY ALKALINE PHOSPHATASE: CPT

## 2017-05-30 PROCEDURE — 700101 HCHG RX REV CODE 250: Performed by: STUDENT IN AN ORGANIZED HEALTH CARE EDUCATION/TRAINING PROGRAM

## 2017-05-30 PROCEDURE — A9270 NON-COVERED ITEM OR SERVICE: HCPCS | Performed by: COLON & RECTAL SURGERY

## 2017-05-30 PROCEDURE — 770021 HCHG ROOM/CARE - ISO PRIVATE

## 2017-05-30 PROCEDURE — 83550 IRON BINDING TEST: CPT

## 2017-05-30 PROCEDURE — 84080 ASSAY ALKALINE PHOSPHATASES: CPT

## 2017-05-30 PROCEDURE — G8980 MOBILITY D/C STATUS: HCPCS | Mod: CH

## 2017-05-30 PROCEDURE — 97165 OT EVAL LOW COMPLEX 30 MIN: CPT

## 2017-05-30 RX ORDER — PREDNISONE 5 MG/1
15 TABLET ORAL DAILY
Status: DISCONTINUED | OUTPATIENT
Start: 2017-05-30 | End: 2017-06-05 | Stop reason: HOSPADM

## 2017-05-30 RX ORDER — POTASSIUM CHLORIDE 20 MEQ/1
40 TABLET, EXTENDED RELEASE ORAL ONCE
Status: COMPLETED | OUTPATIENT
Start: 2017-05-30 | End: 2017-05-30

## 2017-05-30 RX ORDER — FUROSEMIDE 20 MG/1
20 TABLET ORAL
Status: DISCONTINUED | OUTPATIENT
Start: 2017-05-30 | End: 2017-06-01

## 2017-05-30 RX ORDER — ENEMA 19; 7 G/133ML; G/133ML
1 ENEMA RECTAL ONCE
Status: DISCONTINUED | OUTPATIENT
Start: 2017-05-31 | End: 2017-05-31

## 2017-05-30 RX ORDER — LABETALOL HYDROCHLORIDE 5 MG/ML
10 INJECTION, SOLUTION INTRAVENOUS EVERY 6 HOURS PRN
Status: DISCONTINUED | OUTPATIENT
Start: 2017-05-30 | End: 2017-06-05 | Stop reason: HOSPADM

## 2017-05-30 RX ADMIN — PIPERACILLIN AND TAZOBACTAM 2.25 G: 2; .25 INJECTION, POWDER, LYOPHILIZED, FOR SOLUTION INTRAVENOUS; PARENTERAL at 23:59

## 2017-05-30 RX ADMIN — OMEPRAZOLE 20 MG: 20 CAPSULE, DELAYED RELEASE ORAL at 08:35

## 2017-05-30 RX ADMIN — POLYETHYLENE GLYCOL 3350, SODIUM SULFATE, SODIUM CHLORIDE, POTASSIUM CHLORIDE, ASCORBIC ACID, SODIUM ASCORBATE 100 G: KIT at 08:35

## 2017-05-30 RX ADMIN — PIPERACILLIN AND TAZOBACTAM 2.25 G: 2; .25 INJECTION, POWDER, LYOPHILIZED, FOR SOLUTION INTRAVENOUS; PARENTERAL at 00:53

## 2017-05-30 RX ADMIN — POTASSIUM CHLORIDE 40 MEQ: 1500 TABLET, EXTENDED RELEASE ORAL at 10:01

## 2017-05-30 RX ADMIN — AMLODIPINE BESYLATE 5 MG: 5 TABLET ORAL at 08:35

## 2017-05-30 RX ADMIN — HEPARIN SODIUM 5000 UNITS: 5000 INJECTION, SOLUTION INTRAVENOUS; SUBCUTANEOUS at 05:49

## 2017-05-30 RX ADMIN — PIPERACILLIN AND TAZOBACTAM 2.25 G: 2; .25 INJECTION, POWDER, LYOPHILIZED, FOR SOLUTION INTRAVENOUS; PARENTERAL at 05:50

## 2017-05-30 RX ADMIN — PREDNISONE 15 MG: 5 TABLET ORAL at 10:30

## 2017-05-30 RX ADMIN — LABETALOL HYDROCHLORIDE 10 MG: 5 INJECTION, SOLUTION INTRAVENOUS at 20:27

## 2017-05-30 RX ADMIN — OMEPRAZOLE 20 MG: 20 CAPSULE, DELAYED RELEASE ORAL at 20:19

## 2017-05-30 RX ADMIN — FUROSEMIDE 20 MG: 40 TABLET ORAL at 10:00

## 2017-05-30 RX ADMIN — PIPERACILLIN AND TAZOBACTAM 2.25 G: 2; .25 INJECTION, POWDER, LYOPHILIZED, FOR SOLUTION INTRAVENOUS; PARENTERAL at 11:54

## 2017-05-30 RX ADMIN — POLYETHYLENE GLYCOL 3350, SODIUM SULFATE, SODIUM CHLORIDE, POTASSIUM CHLORIDE, ASCORBIC ACID, SODIUM ASCORBATE 100 G: KIT at 20:19

## 2017-05-30 RX ADMIN — HYDROCORTISONE SODIUM SUCCINATE 50 MG: 100 INJECTION, POWDER, FOR SOLUTION INTRAMUSCULAR; INTRAVENOUS at 05:49

## 2017-05-30 RX ADMIN — PIPERACILLIN AND TAZOBACTAM 2.25 G: 2; .25 INJECTION, POWDER, LYOPHILIZED, FOR SOLUTION INTRAVENOUS; PARENTERAL at 17:35

## 2017-05-30 RX ADMIN — HYDROCORTISONE SODIUM SUCCINATE 50 MG: 100 INJECTION, POWDER, FOR SOLUTION INTRAMUSCULAR; INTRAVENOUS at 00:53

## 2017-05-30 ASSESSMENT — PAIN SCALES - GENERAL
PAINLEVEL_OUTOF10: 0
PAINLEVEL_OUTOF10: 6
PAINLEVEL_OUTOF10: 4
PAINLEVEL_OUTOF10: 0
PAINLEVEL_OUTOF10: 7

## 2017-05-30 ASSESSMENT — COGNITIVE AND FUNCTIONAL STATUS - GENERAL
SUGGESTED CMS G CODE MODIFIER MOBILITY: CH
MOBILITY SCORE: 24

## 2017-05-30 ASSESSMENT — ENCOUNTER SYMPTOMS
VOMITING: 0
NAUSEA: 0
HEARTBURN: 0
HEADACHES: 0
BLOOD IN STOOL: 0
COUGH: 0
ABDOMINAL PAIN: 0
PALPITATIONS: 0
DIARRHEA: 0
CHILLS: 0
SHORTNESS OF BREATH: 0
SORE THROAT: 0
FEVER: 0
CONSTIPATION: 0

## 2017-05-30 ASSESSMENT — ACTIVITIES OF DAILY LIVING (ADL): TOILETING: INDEPENDENT

## 2017-05-30 ASSESSMENT — GAIT ASSESSMENTS
GAIT LEVEL OF ASSIST: SUPERVISED
DISTANCE (FEET): 40

## 2017-05-30 NOTE — PROGRESS NOTES
[facility] ICU Progress Note      Admit Date: 5/27/2017  ICU Day: 4    Resident(s): Jair Jarrett  Attending: MARCOS DOYLE/ Dr. Weaver    Date & Time:   5/30/2017   9:41 AM       Patient ID:    Name:             Brandi Leggett   YOB: 1965  Age:                 51 y.o.  female   MRN:               [id number]    Diagnosis:  Septic shock from ?abd source source with multiorgan failure - resolved   Abdominal pain, nausea, vomiting - improved  C diff colitis  Severe anion gap metabolic acidosis /lactic  - improving  Non oliguric acute kidney injury  - improving  Rheumatoid arthritis    SLE  H/o alcohol abuse (quit 3/2017)  Hepatic steatosis    Elevated transaminase     HPI:    Ms Leggett is a 51 yrs old female with PMH of SLE, rheumatoid arthritis, recurrent pancreatitis, alcohol abuse (quit 3/2017), was transferred from [facility] for septic shock, with lactic acid at 6. She was on levophed through peripheral IV line, s/p empiric antibiotics, IVF.    Patient complains of abdominal pain, nausea, vomiting, loose watery stools for the last 3 days, she was unable to keep anything down. She ran out of medications for the last 2 weeks, however states that she took 15 mg prednisone (home dose) yesterday morning.    Denies blood in stool, urine, chest pain, palpitations, focal weakness.    States that she was diagnosed with C diff on 1/2017.     Consultants:    Intensivist  Surgery  GI    Interval Events:    No acute events overnight  Continued bowel movements overnight into today, on moviprep for colonoscopy/EGD tomorrow  Patient currently feels fine, less abdominal pain  Kidney function improving   Remains on clear liquid diet in prep for colonoscopy  Elevated BP today in 190s despite amlodipine addition    PHYSICAL EXAM  Gen: NAD, pleasant and cooperative  HEENT: NC/AT, no scleral icterus, no conjunctival injection, mucous membranes pink and moist.  Neck: Supple, no lymphadenopathy.  Cardiac: S1S2,  RRR, no m/r/g, no JVD.  Respiratory: Decreased breath sounds bilateral  Abdomen: BS+, tenderness over epigastric area without guarding or rigidity, NT/ND, no rebound/guarding, no palpable organomegaly.  Ext: No edema, 2+ DP pulses b/l.  Skin: Warm, dry. No rashes or erythema.   Neuro: AAOx4, no focal sensory or motor deficits.   Psych: Affect, mood, judgement normal.    Respiratory:     Respiration: (!) 35, Pulse Oximetry: 99 %    Chest Tube Drains:    Recent Labs      05/27/17   1006   [id number]  7.36*   [id number]  26.2   [id number]  105.7*   [id number]  96.5   ARTHCO3  15*   ARTBE  -9*       HemoDynamics:  Pulse: 77, Heart Rate (Monitored): (!) 53 NIBP: (!) 196/96 mmHg (Scheduled Amlodinpine to be administered)            Fluids:    Date 05/30/17 0700 - 05/31/17 0659   Shift 6158-1447 2074-2962 6851-3999 24 Hour Total   I  N  T  A  K  E   P.O. 1000   1000      P.O. 1000   1000    I.V. 200   200      IV Volume (NS) 200   200    Shift Total 1200   1200   O  U  T  P  U  T   Urine          Number of Times Voided 1 x   1 x    Stool          Number of Times Stooled 1 x   1 x    Shift Total       NET 1200   1200        Intake/Output Summary (Last 24 hours) at 05/29/17 1206  Last data filed at 05/29/17 1200   Gross per 24 hour   Intake   2945 ml   Output    500 ml   Net   2445 ml          Body mass index is 31.02 kg/(m^2).    Recent Labs      05/27/17   1330   05/28/17   0535  05/29/17   0504  05/30/17   0555   SODIUM  131*   < >  136  136  139   POTASSIUM  3.9   < >  3.7  3.1*  3.2*   CHLORIDE  96   < >  100  105  109   CO2  17*   < >  20  19*  19*   BUN  79*   < >  60*  38*  23*   CREATININE  4.96*   < >  4.10*  2.31*  1.38   PHOSPHORUS  6.5*   --    --    --    --    CALCIUM  6.6*   < >  7.2*  6.7*  7.3*    < > = values in this interval not displayed.       GI/Nutrition:  Recent Labs      05/27/17   0949   05/28/17   0535  05/29/17   0504  05/30/17   0555   ALTSGPT   --    < >  38  29  61*   ASTSGOT   --    < >  46*  46*   97*   ALKPHOSPHAT   --    < >  179*  194*  262*   TBILIRUBIN   --    < >  0.7  0.5  0.6   GAMMAGT   --    --    --    --   1059*   LIPASE  49   --    --    --    --    GLUCOSE   --    < >  134*  180*  168*    < > = values in this interval not displayed.       Heme:  Recent Labs      17   1330  17   0535  17   0555   RBC  3.45*  3.53*  3.81*   HEMOGLOBIN  10.2*  10.3*  11.2*   HEMATOCRIT  30.4*  31.5*  34.1*   PLATELETCT  175  172  127*   PROTHROMBTM  14.3   --    --    APTT  30.3   --    --    INR  1.08   --    --    IRON   --    --   103   FERRITIN   --    --   816.7*   TOTIRONBC   --    --   234*       Infectious Disease:  Temp  Av.4 °C (97.5 °F)  Min: 36.1 °C (97 °F)  Max: 36.6 °C (97.9 °F)  Recent Labs      17   1330  17   0535  17   0504  17   0555   WBC  11.4*  4.6*   --   2.7*   NEUTSPOLYS  75.30*  86.00*   --   68.60   LYMPHOCYTES  16.20*  10.60*   --   25.70   MONOCYTES  7.10  2.80   --   4.90   EOSINOPHILS  0.60  0.00   --   0.00   BASOPHILS  0.50  0.40   --   0.40   ASTSGOT  53*  46*  46*  97*   ALTSGPT  42  38  29  61*   ALKPHOSPHAT  184*  179*  194*  262*   TBILIRUBIN  0.7  0.7  0.5  0.6       Meds:  • potassium chloride (KCl)  40 mEq     • predniSONE  15 mg     • furosemide  20 mg     • amlodipine  5 mg     • piperacillin-tazobactam  2.25 g Stopped (17 0620)   • peg-electrolyte solution  100 g     • NS   100 mL/hr at 17 1343   • Respiratory Care per Protocol       • acetaminophen  650 mg     • promethazine  25 mg     • oxycodone immediate-release  5 mg      Or   • oxycodone immediate-release  10 mg     • ondansetron  4 mg     • heparin  5,000 Units     • omeprazole  20 mg     • prochlorperazine  10 mg          Problem and Plan:    Septic shock from ?abd source source with multiorgan failure - resolved   Abdominal pain, nausea, vomiting - improved  C diff colitis  - presented with abdominal pain with nausea, vomiting, watery loose stools for 3  "days  , hypotension, WBC elevation  - Cdiff PCR+, toxin AB NGT ; - h/o C -diff colitis 1/2017. On vanco po  - CT abd/pelvis at outside facility without contrast negative for intraabdominal pathology    - USG shows \"distended gall bladder, No evidence of gallstone or biliary ductal dilatation, Enlarged echogenic liver consistent with fatty change versus hepatocellular dysfunction\".    -was treated with  Septic protocol,  Empiric IV zosyn, IV vanco,  on oral vanco for C diff  ; IV vanco dc'd   Plan:  - will d/c the solucortef and place back on home prednisone 15 mg daily  - no surgical intervention at the moment per Dr. Ibrahim  - finish course of zosyn tomorrow AM, no active C diff seen on labwork  - received vitamin C, thiamine, solu cortef    - off levophed, VSS  - Dr Ibrahim, surgery consulted for possible early peritonitis  - conservative mgm      FOBT positive  - s/p consultation with GI ( Dr. Canseco), plan for EGD/colonoscopy tomorrow  - Hb currently stable  - continue     Non oliguric acute kidney injury  - improving, likely pre-renal from volume and unlikely secondary to lupus  - markedly improved kidney function with IV fluid therapy  - US renal with no abnormalities  - creatinine improved to 1.38 today  Plan:  - nephro signed off, appreciate help  - continue to hold lisinopril for now and use other therapies for blood pressure management  - potassium replacement with diuresis  - d/c IV fluid    Hypertension- not controlled  - hold off on lisinopril due to RENEE  - wean down steroid therapy and continue amlodipine 5 mg daily  - low dose lasix, 20 mg daily    Hypokalemia  - replete with diuresis    Rheumatoid arthritis    SLE  - on home methotrexate, humira, prednisone   - restart prednisone, continue to hold methotrexate and humira in the setting of infection     Tophaceous gout    - on allopurinol    H/o alcohol abuse (quit 3/2017)  Hepatic steatosis    Elevated transaminase   - Maddrey score 11.3    - quit " alcohol since her father passed away on 3/2017   - US abdomen shows distended gallbladder without evidence of biliary duct dilation or gallstone, fatty liver disease  Plan:  - CTM per GI, ordered fractionated alk phos  - no surgical intervention at this time, continued observation    Normocytic anemia   - H&H stable  - FOBT : positive  - GI consulted, will perform EGD/colonoscopy    Hypovolemic hyponatremia    - resolved    Cervical DJD   - evaluated by Neurosurgery awaiting surgery     Hyperlipidemia   - held home gemfibrozil, pravastatin, restrt once LFT stable    H/o hematuria   - ? Due to methotrexate use    - f/u Urology     DISPO: Transfer to medical floor    CODE STATUS: Full code    Quality Measures:    DVT Prophylaxis: heparin  Ulcer Prophylaxis: omeprazole 20 mg BID  Antibiotics: zosyn      Imaging:  US-ABDOMEN COMPLETE SURVEY   Final Result      1.  Distended gallbladder. No evidence of gallstone or biliary ductal dilatation.      2.  Enlarged echogenic liver consistent with fatty change versus hepatocellular dysfunction.      DX-CHEST-PORTABLE (1 VIEW)   Final Result      Interval placement of a right IJ central line with the catheter tip overlying the cavoatrial junction.      OUTSIDE IMAGES-CT ABDOMEN /PELVIS   Preliminary Result      OUTSIDE IMAGES-DX CHEST   Preliminary Result

## 2017-05-30 NOTE — DISCHARGE SUMMARY
[facility] [facility] [place]      Admit Date:  5/27/2017       Discharge Date:   6/5/17    Service:   [facility] Internal Medicine [place]/Gold Team  Attending Physician(s):   Dr Ferguson /Dr. Boone.      Senior Resident(s):   Dr Herrera/Dr Vázquez  Christopher Resident(s):   Dr Perez      Primary Diagnosis:   Septic shock from ?abd source source with multiorgan failure - resolved    Abdominal pain, nausea, vomiting - improved  Severe anion gap metabolic acidosis /lactic  - improving  Non oliguric acute kidney injury  - improving  Rheumatoid arthritis    SLE  H/o alcohol abuse (quit 3/2017)  Hepatic steatosis    Elevated transaminase                  Hospital Summary (Brief Narrative):       51 yrs old female with PMH of SLE, rheumatoid arthritis, recurrent pancreatitis, alcohol abuse (quit 3/2017), was transferred from [facility] for septic shock, with lactic acid at 6. She was on levophed. She received empiric antibiotics, IVF. Patient was having abdominal pain, nausea, vomiting, loose watery stools for 3 days. She denied blood in stool, urine, chest pain, palpitations, focal weakness. She was diagnosed with C diff on 1/2017. Surgery was consulted, recommended conservative management. Patient was started on sepsis protocol, vancomycin, zosyn.  Patient had non-oliguric renal failure with severe anion gap metabolic acidosis. Nephrology was consulted, started on bicarb drip. C. Diff PCR was positive but the toxin was negative, she was on vancomycin PO for 3 days and then stopped(not active Cdiff infection). IV vancomycin was discontinued on 5/28. Off of levophed. Metabolic acidosis resolved. BP stable. Creatinine slowly improving.  Patient was transferred to floor on 5/31, she was doing better and her diarrhea got better.  She had low Po4, Magnesium and potassium, we gave her supplementation by mouth and IV with a good result.  Her diarrhea got better on cholestyramine.   Hb dropped down from 9>>>7 EGD  "and colonoscopy  on 17: ulcerative esophagitis, gastritis, duodenitis, cecal AVMs x three, hemorrhoids, did not need blood transfusion and her Hb gets stable around 7.        Patient /Hospital Summary (Details -- Problem Oriented) :        Septic shock from unknown source - resolved   Abdominal pain, nausea, vomiting - resolved  - WBC: 16 on admission Lactic acid: 6  - needed norepinephrine for low blood pressure in the ICU.    - Blood culture negative.    - Cdiff PCR+, toxin AB NGT ;    - CT abd/pelvis at outside facility without contrast negative for intraabdominal pathology    - USG shows \"distended gall bladder, No evidence of gallstone or biliary ductal dilatation, Enlarged echogenic liver consistent with fatty change versus hepatocellular dysfunction\".   - Cortizone 10 on admission.    - HIDA:  No evidence for acute cholecystitis or common bile duct obstruction.    Assessment & Plan  Was given vanco PO for  3 >> days and Zosyn  for 5 days >>  No source of infection.    Resolved.   No surgical intervention planned from Surgery ( Dr. Ibrahim), recommended conservative management.   Likely infection intra abdomen, adrenal failure(she was on prednisone and ran it out before admission.  Stable now no fever and no hypotension.     Continue on prednisone  No abx at this time and resume zosyn or vanco PO if needed.     Diarrhea (present on admission)  Overview  Cdiff PCR+, toxin AB NGT.  No WBC in stool.     Assessment & Plan  Likely related to GI bleeding  Started cholystramine on 6/3/17      Abdominal pain (present on admission)  Overview  - US abdomen shows distended gallbladder without evidence of biliary duct dilation or gallstone, fatty liver disease  - AST: 46>>>61>28  - ALT: 36>>62>>43  - Al phosphatase: 179>>>233>>170  - GGT: 1059>>881  HIDA: normal.     Assessment & Plan  Resolved.         Electrolyte depletion (present on admission)  Overview  K: 2.7 and M.6 and Ca: 8 corrected. "     Assessment & Plan  K: 4.2 M.6 and po4: 2.6 on discharge  replacement  Follow up with new labs in one week.      GI bleeding (present on admission)  Overview  Hb: 11 on admission >>>9>>7.7  MCV: 88    FOBT: Positive.    EGD/Colonoscopy on 17: ulcerative esophagitis, gastritis, duodenitis, cecal AVMs x three, hemorrhoids.     Assessment & Plan  Monitor H&H and blood transfusion if Hb less than 7  Omeprazole 20 mg BID and sucralfate.    EGD in 12 weeks  Colonoscopy in 7-10 years.        SLE (systemic lupus erythematosus) (CMS-HCC) (present on admission)  Overview  stable    Assessment & Plan  - on  prednisone 15 mg daily,    - continue to hold methotrexate and humira in the setting of infection     Chronic rheumatic arthritis (CMS-HCC) (present on admission)  Overview  - on home methotrexate, humira, prednisone        Assessment & Plan  - on  prednisone 15 mg daily,    - resume methotrexate and humira on discharge  - follow up with PCP.       Alcohol abuse (present on admission)  Overview  - Maddrey score 11.3    - quit alcohol since her father passed away on 3/2017    - US abdomen shows distended gallbladder without evidence of biliary duct dilation or gallstone, fatty liver disease.  - AST/ALT: 62/62    Assessment & Plan  Encouraged the patient to stay away from alcohol.       Hyperlipidemia (present on admission)  Assessment & Plan  - held home gemfibrozil, pravastatin, restart on discharge.       HTN (hypertension) (present on admission)  Overview  On admission: 180/100    Assessment & Plan  Home med: lisinopril >>was held due to RENEE  On amlodipine 5 mg daily  Follow up and resume lisinopril if needed by PCP.     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  nephro was on board >>did not need dialysis.    Cr: 0.8>>0.6  On prednisone and fluid.       Low vitamin D  level (present on admission)  Overview  Vit D is 8 on 6/1/17  DEXA scan: osteopenia.       Consultants:     GI  Nephrology  General surgery    Procedures:        Central line.     Imaging/ Testing:      NM-BILIARY (HIDA) SCAN WITH CCK   Final Result      1.  No evidence for acute cholecystitis or common bile duct obstruction.   2.  Persistent hepatic parenchymal activity raises the possibility of hepatocellular dysfunction.      Normal gallbladder ejection fraction is 38% or greater.      US-ABDOMEN COMPLETE SURVEY   Final Result      1.  Distended gallbladder. No evidence of gallstone or biliary ductal dilatation.      2.  Enlarged echogenic liver consistent with fatty change versus hepatocellular dysfunction.      DX-CHEST-PORTABLE (1 VIEW)   Final Result      Interval placement of a right IJ central line with the catheter tip overlying the cavoatrial junction.      OUTSIDE IMAGES-CT ABDOMEN /PELVIS   Preliminary Result      OUTSIDE IMAGES-DX CHEST   Preliminary Result            Discharge Medications:         Medication Reconciliation: Completed       Medication List      START taking these medications       Instructions    cholestyramine 4 GM Pack   Last time this was given:  4 g on 6/4/2017  9:04 PM   Commonly known as:  QUESTRAN,PREVALITE    Take 1 Packet by mouth 2 Times a Day for 26 days.   Dose:  4 g       omeprazole 20 MG delayed-release capsule   Last time this was given:  20 mg on 6/4/2017  9:04 PM   Commonly known as:  PRILOSEC    Take 1 Cap by mouth every day.   Dose:  20 mg       phosphorus 155-852-130 MG tablet   Last time this was given:  1 Tab on 6/5/2017  5:49 AM   Commonly known as:  K-PHOS-NEUTRAL, PHOSPHA 250 NEUTRAL    Doctor's comments:  Reassess with new labs   Take 1 Tab by mouth every 6 hours for 5 days.   Dose:  1 Tab       sucralfate 1 GM/10ML Susp   Last time this was given:  1 g on 6/5/2017  5:49 AM   Commonly known as:  CARAFATE    Take 10 mL by mouth every 6 hours.   Dose:  1 g          CHANGE how you take these medications       Instructions    * ergocalciferol 52677 UNIT capsule   What changed:  Another medication with the same name was added. Make sure you understand how and when to take each.   Last time this was given:  50,000 Units on 6/1/2017  4:35 PM   Commonly known as:  DRISDOL    Take 1 cap once weekly for 12 weeks.       * vitamin D (Ergocalciferol) 53657 UNITS Caps capsule   What changed:  You were already taking a medication with the same name, and this prescription was added. Make sure you understand how and when to take each.   Last time this was given:  50,000 Units on 6/1/2017  4:35 PM   Commonly known as:  DRISDOL    Take 1 Cap by mouth every 7 days for 55 days.   Dose:  57730 Units       * predniSONE 5 MG Tabs   What changed:  Another medication with the same name was added. Make sure you understand how and when to take each.   Last time this was given:  15 mg on 6/4/2017 10:00 AM   Commonly known as:  DELTASONE    Take 15 mg by mouth every day.   Dose:  15 mg       * predniSONE 5 MG Tabs   What changed:  You were already taking a medication with the same name, and this prescription was added. Make sure you understand how and when to take each.   Last time this was given:  15 mg on 6/4/2017 10:00 AM   Commonly known as:  DELTASONE    Doctor's comments:  Follow up with PCP.   Take 3 Tabs by mouth every day.   Dose:  15 mg       * Notice:  This list has 4 medication(s) that are the same as other medications prescribed for you. Read the directions carefully, and ask your doctor or other care provider to review them with you.      CONTINUE taking these medications       Instructions    Adalimumab 40 MG/0.8ML Pnkt    Inject 40 mg as instructed every 14 days.   Dose:  40 mg       allopurinol 300 MG Tabs   Commonly known as:  ZYLOPRIM    Take 1 Tab by mouth every day.   Dose:  300 mg       folic acid 1 MG Tabs   Commonly known as:  FOLVITE    Take 2 tabs daily.       gemfibrozil 600  MG Tabs   Commonly known as:  LOPID    Take 600 mg by mouth 2 times a day.   Dose:  600 mg       hydroxychloroquine 200 MG Tabs   Commonly known as:  PLAQUENIL    Take 1 Tab by mouth 2 times a day.   Dose:  200 mg       lisinopril 20 MG Tabs   Commonly known as:  PRINIVIL    Take 20 mg by mouth every day.   Dose:  20 mg       METHOTREXATE PO    Take 1 Tab by mouth every day.   Dose:  1 Tab       oxybutynin 5 MG Tabs   Commonly known as:  DITROPAN    Take 5 mg by mouth 2 Times a Day.   Dose:  5 mg             Disposition:   home    Diet:   healthy    Activity:   As tolerated     Instructions:       The patient was instructed to return to the ER in the event of worsening symptoms. I have counseled the patient on the importance of compliance and the patient has agreed to proceed with all medical recommendations and follow up plan indicated above.   The patient understands that all medications come with benefits and risks. Risks may include permanent injury or death and these risks can be minimized with close reassessment and monitoring.        Primary Care Provider:      Discharge summary faxed to primary care provider:  Completed  Copy of discharge summary given to the patient: Completed    Follow up appointment details :      Jun 29, 2017 1:30 PM   Follow Up Visit with Yamilka Mcknight M.D.   [facility] (--)     [address], [address]  [place] [id number] [phone]         Pending Studies:        Celiac Ab and anti smooth muscle Ab.     Time spent on discharge day patient visit, preparing discharge paperwork and arranging for patient follow up.    Summary of follow up issues:   Follow up with PCP for labs in one week and with rheumatology for meds.     Discharge Time (Minutes) :    >45min      Condition on Discharge  :  ______________________________________________________________________    Interval history/exam for day of discharge:    Stable no diarrhea  Or fever, vital signs are stable and her  labs are ok, we will discharge her and follow up with PCP.     Filed Vitals:    06/04/17 1600 06/04/17 1955 06/04/17 2332 06/05/17 0345   BP: 140/98 133/87 124/75 115/71   Pulse: 79 66 86 76   Temp: 36.3 °C (97.3 °F) 36.6 °C (97.9 °F) 36.7 °C (98 °F) 36.5 °C (97.7 °F)   Resp: 18 18 18 17   Height:       Weight:  85.3 kg (188 lb 0.8 oz)     SpO2: 94% 98% 97% 98%     Weight/BMI: Body mass index is 33.32 kg/(m^2).  Pulse Oximetry: 98 %, O2 (LPM): 0, O2 Delivery: None (Room Air)    Physical Exam   Constitutional: She is oriented to person, place, and time and well-developed, well-nourished, and in no distress. No distress.   Neck: No JVD present. No tracheal deviation present. No thyromegaly present.   Cardiovascular: Normal rate.     No murmur heard.  Pulmonary/Chest: No respiratory distress. She has no wheezes. She has no rales.   Abdominal: She exhibits no distension.. There is no rebound and no guarding.   Musculoskeletal: She exhibits no edema.   Neurological: She is alert and oriented to person, place, and time. No cranial nerve deficit.   Skin: Skin is warm. No rash noted. No erythema.       Most Recent Labs:    Lab Results   Component Value Date/Time    WBC 6.3 06/05/2017 04:35 AM    RBC 2.78* 06/05/2017 04:35 AM    HEMOGLOBIN 8.0* 06/05/2017 04:35 AM    HEMATOCRIT 26.0* 06/05/2017 04:35 AM    MCV 93.5 06/05/2017 04:35 AM    MCH 28.8 06/05/2017 04:35 AM    MCHC 30.8* 06/05/2017 04:35 AM    MPV 9.7 06/05/2017 04:35 AM    NEUTROPHILS-POLYS 68.10 06/04/2017 04:51 AM    LYMPHOCYTES 23.90 06/04/2017 04:51 AM    MONOCYTES 4.40 06/04/2017 04:51 AM    EOSINOPHILS 1.80 06/04/2017 04:51 AM    BASOPHILS 0.90 06/04/2017 04:51 AM    HYPOCHROMIA 1+ 01/22/2014 02:20 AM    ANISOCYTOSIS 1+ 06/04/2017 04:51 AM      Lab Results   Component Value Date/Time    SODIUM 140 06/04/2017 04:51 AM    POTASSIUM 4.2 06/04/2017 04:51 AM    CHLORIDE 111 06/04/2017 04:51 AM    CO2 22 06/04/2017 04:51 AM    GLUCOSE 91 06/04/2017 04:51 AM    BUN  7* 06/04/2017 04:51 AM    CREATININE 0.60 06/04/2017 04:51 AM      Lab Results   Component Value Date/Time    ALT(SGPT) 46 06/04/2017 04:51 AM    AST(SGOT) 31 06/04/2017 04:51 AM    ALKALINE PHOSPHATASE 178* 06/04/2017 04:51 AM    TOTAL BILIRUBIN 0.3 06/04/2017 04:51 AM    DIRECT BILIRUBIN <0.1 11/30/2016 11:25 AM    LIPASE 49 05/27/2017 09:49 AM    ALBUMIN 2.8* 06/04/2017 04:51 AM    GLOBULIN 2.3 06/04/2017 04:51 AM    INR 1.08 05/27/2017 01:30 PM     Lab Results   Component Value Date/Time    PT 14.3 05/27/2017 01:30 PM    INR 1.08 05/27/2017 01:30 PM

## 2017-05-30 NOTE — PROGRESS NOTES
Pulmonary Critical Care Progress Note        Date of Service: 5/30/17  Chief Complaint: N/V/D and septic shock    History of Present Illness: 51 y.o. female admitted for N/V/D and abdominal pain.     ROS:    Respiratory: negative,   Cardiac: negative,   GI: positive abdominal pain and positive diarrhea.    All other systems negative.    Interval Events:  24 hour interval history reviewed   Positive PRC and negative toxin for C diff  Colonoscopy and EGD tomorrow  Hemoglobin trending up, no hematochezia, less abdominal pain  Alert and oriented x4  BP elevated this morning  On room air  Clear liquid diet  Frequent BMs  Afebrile  NS at 100  No CXR today  Zosyn  Per Dr. Garcia, holding Lisinopril          Yesterday's Events:  - less abdominal pain  - less diarrhea  - still feeling weak  - no hematochezia    PFSH:  No change.      Physical Exam  General:  Pleasant, sitting up. Comfortable appearing.   Neuro/Psych: Alert and oriented. Talkative.   HEENT: PERRL.   CVS: RRR  Respiratory: Clear to auscultation bilaterally.   Abdomen/:Soft, no tenderness on exam, no rebound or guarding.   Extremities: No edema     Respiratory:     Pulse Oximetry: 98 %    Recent Labs      05/27/17   1006   [id number]  7.36*   [id number]  26.2   [id number]  105.7*   DRMK6LEN  96.5   ARTHCO3  15*   ARTBE  -9*       HemoDynamics:  Pulse: 66, Heart Rate (Monitored): (!) 53  NIBP: (!) 167/98 mmHg       Imaging: Available data reviewed  Recent Labs      05/27/17   0949   CPKTOTAL  145       Neuro:  GCS Total [place] Coma Score: 15  Imaging: Available data reviewed    Fluids:  Intake/Output       05/28/17 0700 - 05/29/17 0659 05/29/17 0700 - 05/30/17 0659 05/30/17 0700 - 05/31/17 0659      1933-7078 7919-6046 Total 3816-4440 6107-8997 Total 9278-0913 5683-9931 Total       Intake    P.O.  1150  150 1300  1840  -- 1840  --  -- --    P.O. 1919 693 5907 1840 -- 1840 -- -- --    I.V.  1755  200 1955  1300  200 1500  --  -- --    IV Volume (NS) 1375 200  1575 2603 798 7761 -- -- --    IV Volume (TKO) 80 -- 80 -- -- -- -- -- --    IV Piggyback Volume (IV Piggyback) 300 -- 300 200 -- 200 -- -- --    Total Intake 2905 350 3255 3140 200 3340 -- -- --       Output    Urine  1050  -- 1050  1200  250 1450  --  -- --    Number of Times Voided 3 x 1 x 4 x 1 x 1 x 2 x -- -- --    Indwelling Cathether 800 -- 800 -- -- -- -- -- --    Void (ml) 250 -- 250 8580 685 2032 -- -- --    Stool  --  -- --  --  -- --  --  -- --    Number of Times Stooled 4 x 1 x 5 x 4 x 2 x 6 x -- -- --    Total Output 1050 -- 1050 3815 605 3105 -- -- --       Net I/O     0735 571 4349 1940 -50 1890 -- -- --           Recent Labs      17   1330   17   0535  17   0504  17   0555   SODIUM  131*   < >  136  136  139   POTASSIUM  3.9   < >  3.7  3.1*  3.2*   CHLORIDE  96   < >  100  105  109   CO2  17*   < >  20  19*  19*   BUN  79*   < >  60*  38*  23*   CREATININE  4.96*   < >  4.10*  2.31*  1.38   PHOSPHORUS  6.5*   --    --    --    --    CALCIUM  6.6*   < >  7.2*  6.7*  7.3*    < > = values in this interval not displayed.       GI/Nutrition:    Imaging: Available data reviewed  taking PO  Liver Function  Recent Labs      17   0949  17   1330  17   1650  17   0535  17   0504   ALTSGPT   --   42   --   38  29   ASTSGOT   --   53*   --   46*  46*   ALKPHOSPHAT   --   184*   --   179*  194*   TBILIRUBIN   --   0.7   --   0.7  0.5   LIPASE  49   --    --    --    --    GLUCOSE   --   91  132*  134*  180*       Heme:  Recent Labs      17   0623  17   1330  17   0535   RBC  3.82*  3.45*  3.53*   HEMOGLOBIN  11.2*  10.2*  10.3*   HEMATOCRIT  34.0*  30.4*  31.5*   PLATELETCT  309  175  172   PROTHROMBTM   --   14.3   --    APTT   --   30.3   --    INR   --   1.08   --        Infectious Disease:  Temp  Av.4 °C (97.5 °F)  Min: 36.1 °C (97 °F)  Max: 36.6 °C (97.9 °F)  Micro: antibiotics reviewed, cultures pending and cultures  reviewed  Recent Labs      05/27/17   1330  05/28/17   0535  05/29/17   0504  05/30/17   0555   WBC  11.4*  4.6*   --   2.7*   NEUTSPOLYS  75.30*  86.00*   --   68.60   LYMPHOCYTES  16.20*  10.60*   --   25.70   MONOCYTES  7.10  2.80   --   4.90   EOSINOPHILS  0.60  0.00   --   0.00   BASOPHILS  0.50  0.40   --   0.40   ASTSGOT  53*  46*  46*  97*   ALTSGPT  42  38  29  61*   ALKPHOSPHAT  184*  179*  194*  262*   TBILIRUBIN  0.7  0.7  0.5  0.6     Current Facility-Administered Medications   Medication Dose Frequency Provider Last Rate Last Dose   • amlodipine (NORVASC) tablet 5 mg  5 mg Q DAY Mario Garcia M.D.   5 mg at 05/29/17 1132   • piperacillin-tazobactam (ZOSYN) 2.25 g in  mL IVPB  2.25 g Q6HRS Karmen Baker, PHARMD 200 mL/hr at 05/30/17 0053 2.25 g at 05/30/17 0053   • peg-electrolyte solution (MOVIPREP) package 100 g  100 g BID Enrique Canseco M.D.       • NS infusion 2,178 mL  30 mL/kg Once PRN Rosemary Scherer M.D.       • NS (BOLUS) infusion 1,000 mL  1,000 mL Once PRN Rosemary Scherer M.D.       • NS infusion   Continuous Kevin Armendariz M.D. 100 mL/hr at 05/29/17 1343     • Respiratory Care per Protocol   Continuous RT Rosemary Scherer M.D.       • acetaminophen (TYLENOL) tablet 650 mg  650 mg Q6HRS PRN Rosemary Scherer M.D.       • promethazine (PHENERGAN) tablet 25 mg  25 mg Q6HRS PRN Rosemary Scherer M.D.       • fentaNYL (SUBLIMAZE) injection 25 mcg  25 mcg Q HOUR PRN Kevin Armendariz M.D.   25 mcg at 05/27/17 1131   • oxycodone immediate-release (ROXICODONE) tablet 5 mg  5 mg Q4HRS PRNEYMAR Armendariz M.D.   5 mg at 05/27/17 1815    Or   • oxycodone immediate release (ROXICODONE) tablet 10 mg  10 mg Q4HRS PRNEYMAR Armendariz M.D.       • ondansetron (ZOFRAN) syringe/vial injection 4 mg  4 mg Q4HRS PRNEYMAR Armendariz M.D.   4 mg at 05/27/17 1222   • heparin injection 5,000 Units  5,000 Units Q8HRS Rosemary Scherer M.D.   5,000 Units at 05/29/17 2102   • omeprazole  (PRILOSEC) capsule 20 mg  20 mg BID He Ibrahim M.D.   20 mg at 05/29/17 2041   • hydrocortisone sodium succinate PF (SOLU-CORTEF) 100 MG injection 50 mg  50 mg Q6HRS Kevin Armendariz M.D.   50 mg at 05/30/17 0053   • prochlorperazine (COMPAZINE) injection 10 mg  10 mg Q6HRS PRN Kevin Armendariz M.D.   10 mg at 05/27/17 2109     Last reviewed on 5/27/2017  9:49 AM by Modesta Mirza, Pharmacy Int    Quality  Measures:  Medications reviewed, Labs reviewed, Radiology images reviewed and EKG reviewed  Kovacs catheter: No Kovacs      DVT Prophylaxis: Heparin  DVT prophylaxis - mechanical: SCDs  Ulcer prophylaxis: Not indicated  Antibiotics: Treating active infection/contamination beyond 24 hours perioperative coverage        Problems/Plan:  Severe Sepsis with Septic Shock due to an abdominal source   - s/p sepsis protocol   - off pressors   - resolved  Acute Abdominal Pain    - ct abdo osh (-),    -  US with enlarged GG w/o stones  Acute Kidney Injury   - improved creatinine and UOP   - avoiding nephrotoxins   - likely related to ATN  Acute AG Metabolic Acidosis    - resolved  Lactic Acidosis    - resolved  Immunocompromised Host    - chronic steroids, humira, methotrexate, autoimmune disease   - wean steroids to home dose  Lupus   - appears stable  Leukocytosis    - improved  Hyponatremia    - resolved  Elevated liver enzymes    - improving, ct abdo and us reviewed  Hx of pancreatitis  Recent C Diff Colitis   - cont oral vanco    Stable from an ICU perspective and can be transferred to the medical floor under the care of [facility] medicine.  The PCCM team will sign off the case upon transfer.  Please do not hesitate to call for any questions or concerns.    Discussed patient condition and risk of morbidity and/or mortality with RN, RT, Therapies, Pharmacy and R Gold resident.    [id number]     Chrissy GOODEN (Mayte), am scribing for, and in the presence of, Dasia Weaver M.D..    Electronically signed by: Chrissy RENTERIA  Krysten (Scribrafal), 5/30/2017    aDsia GOODEN M.D. personally performed the services described in this documentation, as scribed by Chrissy Bashir in my presence, and it is both accurate and complete.

## 2017-05-30 NOTE — PROGRESS NOTES
Nephrology Progress Note, Adult, Complex               Author: Mario Garcia   Date & Time created: 5/30/2017  8:59 AM     Interval History:  52 y/o female with RENEE secondary to volume depletion, sepsis and a history of SLE. She has been given IVF and has recovered renal function.     No evidence of lupus nephritis, complements normal.    Review of Systems:  Review of Systems   Constitutional: Negative for fever and chills.   Cardiovascular: Negative for chest pain and palpitations.       Physical Exam:  Physical Exam   Constitutional: She is oriented to person, place, and time. She appears well-developed and well-nourished. No distress.   HENT:   Head: Normocephalic and atraumatic.   Mouth/Throat: Oropharynx is clear and moist. No oropharyngeal exudate.   Neck: Normal range of motion. Neck supple.   Cardiovascular: Normal rate and regular rhythm.  Exam reveals no gallop and no friction rub.    Pulmonary/Chest: Effort normal and breath sounds normal.   Musculoskeletal: She exhibits no edema.   Neurological: She is alert and oriented to person, place, and time.   Nursing note and vitals reviewed.      Labs:  Recent Labs      05/27/17   1006   ASJON96J  7.36*   [id number]  26.2   NAYUM884L  105.7*   LRZI6MGZ  96.5   ARTHCO3  15*   ARTBE  -9*     Recent Labs      05/27/17   0949   CPKTOTAL  145     Recent Labs      05/27/17   1330   05/28/17   0535  05/29/17   0504  05/30/17   0555   SODIUM  131*   < >  136  136  139   POTASSIUM  3.9   < >  3.7  3.1*  3.2*   CHLORIDE  96   < >  100  105  109   CO2  17*   < >  20  19*  19*   BUN  79*   < >  60*  38*  23*   CREATININE  4.96*   < >  4.10*  2.31*  1.38   PHOSPHORUS  6.5*   --    --    --    --    CALCIUM  6.6*   < >  7.2*  6.7*  7.3*    < > = values in this interval not displayed.     Recent Labs      05/27/17   0949   05/28/17   0535  05/29/17   0504  05/30/17   0555   ALTSGPT   --    < >  38  29  61*   ASTSGOT   --    < >  46*  46*  97*   ALKPHOSPHAT   --    < >  179*   194*  262*   TBILIRUBIN   --    < >  0.7  0.5  0.6   GAMMAGT   --    --    --    --   1059*   LIPASE  49   --    --    --    --    GLUCOSE   --    < >  134*  180*  168*    < > = values in this interval not displayed.     Recent Labs      17   1330  17   0535  17   0555   RBC  3.45*  3.53*  3.81*   HEMOGLOBIN  10.2*  10.3*  11.2*   HEMATOCRIT  30.4*  31.5*  34.1*   PLATELETCT  175  172  127*   PROTHROMBTM  14.3   --    --    APTT  30.3   --    --    INR  1.08   --    --    IRON   --    --   103   FERRITIN   --    --   816.7*   TOTIRONBC   --    --   234*     Recent Labs      170  17   0535  17   0504  17   0555   WBC  11.4*  4.6*   --   2.7*   NEUTSPOLYS  75.30*  86.00*   --   68.60   LYMPHOCYTES  16.20*  10.60*   --   25.70   MONOCYTES  7.10  2.80   --   4.90   EOSINOPHILS  0.60  0.00   --   0.00   BASOPHILS  0.50  0.40   --   0.40   ASTSGOT  53*  46*  46*  97*   ALTSGPT  42  38  29  61*   ALKPHOSPHAT  184*  179*  194*  262*   TBILIRUBIN  0.7  0.7  0.5  0.6           Hemodynamics:  Temp (24hrs), Av.4 °C (97.5 °F), Min:36.1 °C (97 °F), Max:36.6 °C (97.9 °F)  Temperature: 36.3 °C (97.4 °F)  Pulse  Av.9  Min: 56  Max: 128Heart Rate (Monitored): (!) 53  NIBP: (!) 196/96 mmHg (Scheduled Amlodinpine to be administered)     Respiratory:    Respiration: (!) 35, Pulse Oximetry: 99 %        RUL Breath Sounds: Clear, RML Breath Sounds: Diminished, RLL Breath Sounds: Diminished, ARNOL Breath Sounds: Clear, LLL Breath Sounds: Diminished  Fluids:    Intake/Output Summary (Last 24 hours) at 17 0859  Last data filed at 17 0800   Gross per 24 hour   Intake   4890 ml   Output   1850 ml   Net   3040 ml        GI/Nutrition:  Orders Placed This Encounter   Procedures   • DIET ORDER     Standing Status: Standing      Number of Occurrences: 1      Standing Expiration Date:      Order Specific Question:  Diet:     Answer:  Clear Liquids - No Red Foods [12]     Medical  Decision Making, by Problem:  Active Hospital Problems    Diagnosis   • Septic shock (CMS-HCC) [A41.9, R62.21]       Labs reviewed, Medications reviewed and Radiology images reviewed                    Assessment and Plan    1.RENEE significantly improved, Cr markedly improved, Cr 1.38  2.Hypertension, BP elevated, started on norvasc  3.Electrolytes: Hypokalemia   4.Anemia: Hb 10.3       -KCl  -Increase norvasc  -May need low dose lasix  -Weaning hydrocortisone will help HTN  -ok to start low dose ACEi if needed and titrate  -Will sign off for now, please call with any issues

## 2017-05-30 NOTE — THERAPY
"Occupational Therapy Evaluation completed.   Functional Status:  Pt performing ADLs with supervsion/MI, has no concerns at this time in regards to basic self-care tasks. Appears to be back to baseline and no further acute skilled services indicated at this time.   Plan of Care: Patient with no further skilled OT needs in the acute care setting at this time  Discharge Recommendations:  Equipment: No Equipment Needed. Post-acute therapy Discharge to home with outpatient or home health for additional skilled therapy services. and Currently anticipate no further skilled therapy needs once patient is discharged from the inpatient setting.    See \"Rehab Therapy-Acute\" Patient Summary Report for complete documentation.    "

## 2017-05-30 NOTE — PROGRESS NOTES
Gastroenterology Progress Note     Author: Enrique Canseco   Date & Time Created: 5/30/2017 11:29 AM    Interval History:  GI Attending  Cc: rectal bleeding , diarrhea, nausea, vomiting, abdominal pains  S: Patient examined and interviewed, course reviewed.  Pt is taking prep in anticipation of colonoscopy and EGD tomorrow. No rectal bleeding or melena noted.  Complains of diffuse abdominal discomfort at times.  No identified alleviating or aggravating factors.  Intermittent.    Her labs show elevation of ferritin which I suspect is an acute phase reactant in this setting. Aminotransferases a little higher and GGT quite high.  She has an appointment June 9th for a liver biopsy.       Review of Systems:  Review of Systems   Constitutional: Negative for fever and chills.   HENT: Negative for sore throat.    Respiratory: Negative for cough and shortness of breath.    Cardiovascular: Negative for chest pain and palpitations.   Gastrointestinal: Negative for heartburn, nausea, vomiting, abdominal pain, diarrhea, constipation and blood in stool.   Genitourinary: Negative for dysuria and hematuria.   Skin: Negative for itching and rash.   Neurological: Negative for headaches.       Physical Exam:  Physical Exam   Constitutional: She appears well-developed and well-nourished. No distress.   Obese wf, nad    HENT:   Head: Normocephalic and atraumatic.   Eyes: Right eye exhibits no discharge. Left eye exhibits no discharge. No scleral icterus.   Neck: No thyromegaly present.   Cardiovascular: Normal rate and regular rhythm.    No murmur heard.  Pulmonary/Chest: Breath sounds normal. She is in respiratory distress.   Abdominal: She exhibits distension. There is tenderness. There is no rebound and no guarding.   Musculoskeletal: She exhibits no edema.   Skin: She is not diaphoretic.       Labs:        Invalid input(s): [id number]      Recent Labs      05/27/17   1330   05/28/17   0535  05/29/17   0504  05/30/17   0555    SODIUM  131*   < >  136  136  139   POTASSIUM  3.9   < >  3.7  3.1*  3.2*   CHLORIDE  96   < >  100  105  109   CO2  17*   < >  20  19*  19*   BUN  79*   < >  60*  38*  23*   CREATININE  4.96*   < >  4.10*  2.31*  1.38   PHOSPHORUS  6.5*   --    --    --    --    CALCIUM  6.6*   < >  7.2*  6.7*  7.3*    < > = values in this interval not displayed.     Recent Labs      17   0535  17   0504  17   0555   ALTSGPT  38  29  61*   ASTSGOT  46*  46*  97*   ALKPHOSPHAT  179*  194*  262*   TBILIRUBIN  0.7  0.5  0.6   GAMMAGT   --    --   1059*   GLUCOSE  134*  180*  168*     Recent Labs      17   1330  17   0535  17   0555   RBC  3.45*  3.53*  3.81*   HEMOGLOBIN  10.2*  10.3*  11.2*   HEMATOCRIT  30.4*  31.5*  34.1*   PLATELETCT  175  172  127*   PROTHROMBTM  14.3   --    --    APTT  30.3   --    --    INR  1.08   --    --    IRON   --    --   103   FERRITIN   --    --   816.7*   TOTIRONBC   --    --   234*     Recent Labs      17   1330  17   0535  17   0504  17   0555   WBC  11.4*  4.6*   --   2.7*   NEUTSPOLYS  75.30*  86.00*   --   68.60   LYMPHOCYTES  16.20*  10.60*   --   25.70   MONOCYTES  7.10  2.80   --   4.90   EOSINOPHILS  0.60  0.00   --   0.00   BASOPHILS  0.50  0.40   --   0.40   ASTSGOT  53*  46*  46*  97*   ALTSGPT  42  38  29  61*   ALKPHOSPHAT  184*  179*  194*  262*   TBILIRUBIN  0.7  0.7  0.5  0.6     Hemodynamics:  Temp (24hrs), Av.4 °C (97.5 °F), Min:36.1 °C (97 °F), Max:36.6 °C (97.9 °F)  Temperature: 36.3 °C (97.4 °F)  Pulse  Av.9  Min: 56  Max: 128Heart Rate (Monitored): (!) 53  NIBP: (!) 196/96 mmHg (Scheduled Amlodinpine to be administered)     Respiratory:    Respiration: (!) 35, Pulse Oximetry: 99 %        RUL Breath Sounds: Clear, RML Breath Sounds: Diminished, RLL Breath Sounds: Diminished, ARNOL Breath Sounds: Clear, LLL Breath Sounds: Diminished  Fluids:    Intake/Output Summary (Last 24 hours) at 17 1129  Last data  filed at 05/30/17 1000   Gross per 24 hour   Intake   4640 ml   Output   1850 ml   Net   2790 ml        GI/Nutrition:  Orders Placed This Encounter   Procedures   • DIET ORDER     Standing Status: Standing      Number of Occurrences: 1      Standing Expiration Date:      Order Specific Question:  Diet:     Answer:  Clear Liquids - No Red Foods [12]     Medical Decision Making, by Problem:  Active Hospital Problems    Diagnosis   • Diarrhea [R19.7]   • RENEE (acute kidney injury) (CMS-HCC) [N17.9]   • Abdominal pain [R10.9]   • Septic shock (CMS-HCC) [A41.9, R65.21]     IMPRESSION(S):  1.  Abdominal pain -- improved.  2.  Nausea and vomiting -- improved.  3.  Diarrhea -- improved, but somewhat persistent.  4.  Hematochezia/hemoccult positivity.  5.  Clostridium difficile colonization.  6.  Rheumatoid arthritis.  7.  Lupus.  8.  Increased aminotransferases with fatty liver.  9.  History of alcohol use.  10.  Obesity.    Plan:  1) [place] and EGD at 1:30 PM tomorrow  2) Moviprep today  3) Fleet enema in AM tomorrow  4) CLD today, no red and then npo after midnight   5) Serial labs  6) Pt scheduled for out patient liver biopsy on 6/9/17 with Dr Chase.  Will need to cancel this if still in hospital obviously and consider having it done here at [facility].     Labs reviewed, Medications reviewed and Radiology images reviewed

## 2017-05-30 NOTE — CARE PLAN
Problem: Nutritional:  Goal: Achieve adequate nutritional intake  Patient will consume 50% of meals   Outcome: PROGRESSING SLOWER THAN EXPECTED  Diet adv to clear liquid diet, po mostly % of meals recorded   Intervention: Advance diet as tolerated  Con't to advance diet from CL diet as pt is able per MD     Please record percentage of all meals consumed in ADLs to help monitor po adequacy     RD following

## 2017-05-30 NOTE — CARE PLAN
Problem: Safety  Goal: Will remain free from injury  Outcome: PROGRESSING AS EXPECTED  Providing assistance with mobility, reminding patient to use call light before getting out of bed.    Problem: Knowledge Deficit  Goal: Knowledge of disease process/condition, treatment plan, diagnostic tests, and medications will improve  Outcome: PROGRESSING AS EXPECTED  Educating paitien regarding disease process, condition, plan of care, tests, and medications.

## 2017-05-30 NOTE — CARE PLAN
Problem: Fluid Volume:  Goal: Will maintain balanced intake and output  Pt htn. MIVF running at 100ml/hr. Discussed with MD. Fluids stopped and lasix administered. Daily weight to be taken and entered into Epic.     Problem: Skin Integrity  Goal: Risk for impaired skin integrity will decrease  Jayro score 17. Head-to-toe skin assessment completed. Pt demonstrates ability to move self independently in bed. No new s/sx of skin breakdown noted.

## 2017-05-30 NOTE — THERAPY
"Physical Therapy Evaluation completed.   Bed Mobility:  Supine to Sit: Supervised  Transfers: Sit to Stand: Supervised  Gait: Level Of Assist: Supervised with No Equipment Needed       Plan of Care: Patient with no further skilled PT needs in the acute care setting at this time  Discharge Recommendations: Equipment: No Equipment Needed       See \"Rehab Therapy-Acute\" Patient Summary Report for complete documentation.     "

## 2017-05-30 NOTE — PROGRESS NOTES
Infectious disease department contacted regarding whether patient needs to be in isolation with positive CDIFF PCR but negative toxin. For now, the current policy stands and all patients are required to be in isolation with positive PCRs.

## 2017-05-31 LAB
ALBUMIN SERPL BCP-MCNC: 2.6 G/DL (ref 3.2–4.9)
ALBUMIN/GLOB SERPL: 1.1 G/DL
ALP BONE SERPL-CCNC: 49 U/L (ref 0–55)
ALP ISOS SERPL HS-CCNC: 0 U/L
ALP LIVER SERPL-CCNC: 238 U/L (ref 0–94)
ALP SERPL-CCNC: 245 U/L (ref 30–99)
ALP SERPL-CCNC: 287 U/L (ref 40–120)
ALT SERPL-CCNC: 70 U/L (ref 2–50)
ANION GAP SERPL CALC-SCNC: 10 MMOL/L (ref 0–11.9)
ANION GAP SERPL CALC-SCNC: 11 MMOL/L (ref 0–11.9)
ANION GAP SERPL CALC-SCNC: 7 MMOL/L (ref 0–11.9)
AST SERPL-CCNC: 104 U/L (ref 12–45)
BASOPHILS # BLD AUTO: 0.4 % (ref 0–1.8)
BASOPHILS # BLD: 0.02 K/UL (ref 0–0.12)
BILIRUB SERPL-MCNC: 0.6 MG/DL (ref 0.1–1.5)
BUN SERPL-MCNC: 11 MG/DL (ref 8–22)
BUN SERPL-MCNC: 13 MG/DL (ref 8–22)
BUN SERPL-MCNC: 15 MG/DL (ref 8–22)
CALCIUM SERPL-MCNC: 6.6 MG/DL (ref 8.5–10.5)
CALCIUM SERPL-MCNC: 6.7 MG/DL (ref 8.5–10.5)
CALCIUM SERPL-MCNC: 6.8 MG/DL (ref 8.5–10.5)
CHLORIDE SERPL-SCNC: 107 MMOL/L (ref 96–112)
CHLORIDE SERPL-SCNC: 109 MMOL/L (ref 96–112)
CHLORIDE SERPL-SCNC: 110 MMOL/L (ref 96–112)
CO2 SERPL-SCNC: 19 MMOL/L (ref 20–33)
CREAT SERPL-MCNC: 0.74 MG/DL (ref 0.5–1.4)
CREAT SERPL-MCNC: 0.88 MG/DL (ref 0.5–1.4)
CREAT SERPL-MCNC: 0.91 MG/DL (ref 0.5–1.4)
EOSINOPHIL # BLD AUTO: 0.04 K/UL (ref 0–0.51)
EOSINOPHIL NFR BLD: 0.8 % (ref 0–6.9)
ERYTHROCYTE [DISTWIDTH] IN BLOOD BY AUTOMATED COUNT: 42.6 FL (ref 35.9–50)
GFR SERPL CREATININE-BSD FRML MDRD: >60 ML/MIN/1.73 M 2
GLOBULIN SER CALC-MCNC: 2.3 G/DL (ref 1.9–3.5)
GLUCOSE SERPL-MCNC: 110 MG/DL (ref 65–99)
GLUCOSE SERPL-MCNC: 128 MG/DL (ref 65–99)
GLUCOSE SERPL-MCNC: 143 MG/DL (ref 65–99)
HCT VFR BLD AUTO: 28.5 % (ref 37–47)
HGB BLD-MCNC: 9 G/DL (ref 12–16)
IMM GRANULOCYTES # BLD AUTO: 0.03 K/UL (ref 0–0.11)
IMM GRANULOCYTES NFR BLD AUTO: 0.6 % (ref 0–0.9)
LYMPHOCYTES # BLD AUTO: 1.91 K/UL (ref 1–4.8)
LYMPHOCYTES NFR BLD: 37.8 % (ref 22–41)
MAGNESIUM SERPL-MCNC: 1 MG/DL (ref 1.5–2.5)
MCH RBC QN AUTO: 28.9 PG (ref 27–33)
MCHC RBC AUTO-ENTMCNC: 31.6 G/DL (ref 33.6–35)
MCV RBC AUTO: 91.6 FL (ref 81.4–97.8)
MONOCYTES # BLD AUTO: 0.43 K/UL (ref 0–0.85)
MONOCYTES NFR BLD AUTO: 8.5 % (ref 0–13.4)
NEUTROPHILS # BLD AUTO: 2.62 K/UL (ref 2–7.15)
NEUTROPHILS NFR BLD: 51.9 % (ref 44–72)
NRBC # BLD AUTO: 0 K/UL
NRBC BLD AUTO-RTO: 0 /100 WBC
PHOSPHATE SERPL-MCNC: 1.7 MG/DL (ref 2.5–4.5)
PLATELET # BLD AUTO: 115 K/UL (ref 164–446)
PMV BLD AUTO: 10.2 FL (ref 9–12.9)
POTASSIUM BLD-SCNC: 3.2 MMOL/L (ref 3.6–5.5)
POTASSIUM SERPL-SCNC: 2.4 MMOL/L (ref 3.6–5.5)
POTASSIUM SERPL-SCNC: 3.1 MMOL/L (ref 3.6–5.5)
POTASSIUM SERPL-SCNC: 3.2 MMOL/L (ref 3.6–5.5)
POTASSIUM SERPL-SCNC: 6.5 MMOL/L (ref 3.6–5.5)
PROT SERPL-MCNC: 4.9 G/DL (ref 6–8.2)
RBC # BLD AUTO: 3.11 M/UL (ref 4.2–5.4)
SODIUM SERPL-SCNC: 135 MMOL/L (ref 135–145)
SODIUM SERPL-SCNC: 137 MMOL/L (ref 135–145)
SODIUM SERPL-SCNC: 139 MMOL/L (ref 135–145)
WBC # BLD AUTO: 5.1 K/UL (ref 4.8–10.8)

## 2017-05-31 PROCEDURE — 700111 HCHG RX REV CODE 636 W/ 250 OVERRIDE (IP): Performed by: INTERNAL MEDICINE

## 2017-05-31 PROCEDURE — 0W3P8ZZ CONTROL BLEEDING IN GASTROINTESTINAL TRACT, VIA NATURAL OR ARTIFICIAL OPENING ENDOSCOPIC: ICD-10-PCS | Performed by: INTERNAL MEDICINE

## 2017-05-31 PROCEDURE — 85025 COMPLETE CBC W/AUTO DIFF WBC: CPT

## 2017-05-31 PROCEDURE — 501159 HCHG PROBE, LAP: Performed by: INTERNAL MEDICINE

## 2017-05-31 PROCEDURE — 160209 HCHG ENDO MINUTES - EA ADDL 1 MIN LEVEL 5: Performed by: INTERNAL MEDICINE

## 2017-05-31 PROCEDURE — 700102 HCHG RX REV CODE 250 W/ 637 OVERRIDE(OP): Performed by: COLON & RECTAL SURGERY

## 2017-05-31 PROCEDURE — 700105 HCHG RX REV CODE 258

## 2017-05-31 PROCEDURE — 700102 HCHG RX REV CODE 250 W/ 637 OVERRIDE(OP): Performed by: STUDENT IN AN ORGANIZED HEALTH CARE EDUCATION/TRAINING PROGRAM

## 2017-05-31 PROCEDURE — A9270 NON-COVERED ITEM OR SERVICE: HCPCS | Performed by: INTERNAL MEDICINE

## 2017-05-31 PROCEDURE — 700102 HCHG RX REV CODE 250 W/ 637 OVERRIDE(OP): Performed by: INTERNAL MEDICINE

## 2017-05-31 PROCEDURE — 160036 HCHG PACU - EA ADDL 30 MINS PHASE I: Performed by: INTERNAL MEDICINE

## 2017-05-31 PROCEDURE — 700111 HCHG RX REV CODE 636 W/ 250 OVERRIDE (IP): Performed by: STUDENT IN AN ORGANIZED HEALTH CARE EDUCATION/TRAINING PROGRAM

## 2017-05-31 PROCEDURE — 84100 ASSAY OF PHOSPHORUS: CPT

## 2017-05-31 PROCEDURE — 160035 HCHG PACU - 1ST 60 MINS PHASE I: Performed by: INTERNAL MEDICINE

## 2017-05-31 PROCEDURE — 500066 HCHG BITE BLOCK, ECT: Performed by: INTERNAL MEDICINE

## 2017-05-31 PROCEDURE — 0DB78ZX EXCISION OF STOMACH, PYLORUS, VIA NATURAL OR ARTIFICIAL OPENING ENDOSCOPIC, DIAGNOSTIC: ICD-10-PCS | Performed by: INTERNAL MEDICINE

## 2017-05-31 PROCEDURE — 88305 TISSUE EXAM BY PATHOLOGIST: CPT | Mod: 59

## 2017-05-31 PROCEDURE — 80048 BASIC METABOLIC PNL TOTAL CA: CPT

## 2017-05-31 PROCEDURE — 700111 HCHG RX REV CODE 636 W/ 250 OVERRIDE (IP)

## 2017-05-31 PROCEDURE — 160204 HCHG ENDO MINUTES - 1ST 30 MINS LEVEL 5: Performed by: INTERNAL MEDICINE

## 2017-05-31 PROCEDURE — 0DB68ZX EXCISION OF STOMACH, VIA NATURAL OR ARTIFICIAL OPENING ENDOSCOPIC, DIAGNOSTIC: ICD-10-PCS | Performed by: INTERNAL MEDICINE

## 2017-05-31 PROCEDURE — 770020 HCHG ROOM/CARE - TELE (206)

## 2017-05-31 PROCEDURE — 0DB98ZX EXCISION OF DUODENUM, VIA NATURAL OR ARTIFICIAL OPENING ENDOSCOPIC, DIAGNOSTIC: ICD-10-PCS | Performed by: INTERNAL MEDICINE

## 2017-05-31 PROCEDURE — 84132 ASSAY OF SERUM POTASSIUM: CPT

## 2017-05-31 PROCEDURE — 502240 HCHG MISC OR SUPPLY RC 0272: Performed by: INTERNAL MEDICINE

## 2017-05-31 PROCEDURE — 160002 HCHG RECOVERY MINUTES (STAT): Performed by: INTERNAL MEDICINE

## 2017-05-31 PROCEDURE — 700101 HCHG RX REV CODE 250

## 2017-05-31 PROCEDURE — 700105 HCHG RX REV CODE 258: Performed by: STUDENT IN AN ORGANIZED HEALTH CARE EDUCATION/TRAINING PROGRAM

## 2017-05-31 PROCEDURE — 88312 SPECIAL STAINS GROUP 1: CPT

## 2017-05-31 PROCEDURE — 700111 HCHG RX REV CODE 636 W/ 250 OVERRIDE (IP): Performed by: HOSPITALIST

## 2017-05-31 PROCEDURE — A9270 NON-COVERED ITEM OR SERVICE: HCPCS | Performed by: STUDENT IN AN ORGANIZED HEALTH CARE EDUCATION/TRAINING PROGRAM

## 2017-05-31 PROCEDURE — 700101 HCHG RX REV CODE 250: Performed by: STUDENT IN AN ORGANIZED HEALTH CARE EDUCATION/TRAINING PROGRAM

## 2017-05-31 PROCEDURE — 99232 SBSQ HOSP IP/OBS MODERATE 35: CPT | Performed by: INTERNAL MEDICINE

## 2017-05-31 PROCEDURE — 83735 ASSAY OF MAGNESIUM: CPT

## 2017-05-31 PROCEDURE — 160009 HCHG ANES TIME/MIN: Performed by: INTERNAL MEDICINE

## 2017-05-31 PROCEDURE — A9270 NON-COVERED ITEM OR SERVICE: HCPCS | Performed by: COLON & RECTAL SURGERY

## 2017-05-31 PROCEDURE — 160048 HCHG OR STATISTICAL LEVEL 1-5: Performed by: INTERNAL MEDICINE

## 2017-05-31 PROCEDURE — 80053 COMPREHEN METABOLIC PANEL: CPT

## 2017-05-31 RX ORDER — POTASSIUM CHLORIDE 7.45 MG/ML
10 INJECTION INTRAVENOUS
Status: DISCONTINUED | OUTPATIENT
Start: 2017-05-31 | End: 2017-05-31

## 2017-05-31 RX ORDER — POTASSIUM CHLORIDE 20 MEQ/1
40 TABLET, EXTENDED RELEASE ORAL DAILY
Status: DISCONTINUED | OUTPATIENT
Start: 2017-05-31 | End: 2017-06-01

## 2017-05-31 RX ORDER — MAGNESIUM SULFATE HEPTAHYDRATE 40 MG/ML
4 INJECTION, SOLUTION INTRAVENOUS ONCE
Status: COMPLETED | OUTPATIENT
Start: 2017-05-31 | End: 2017-05-31

## 2017-05-31 RX ORDER — POTASSIUM CHLORIDE 7.45 MG/ML
10 INJECTION INTRAVENOUS
Status: DISPENSED | OUTPATIENT
Start: 2017-05-31 | End: 2017-05-31

## 2017-05-31 RX ORDER — POTASSIUM CHLORIDE 7.45 MG/ML
10 INJECTION INTRAVENOUS
Status: COMPLETED | OUTPATIENT
Start: 2017-05-31 | End: 2017-06-01

## 2017-05-31 RX ORDER — SUCRALFATE ORAL 1 G/10ML
1 SUSPENSION ORAL EVERY 6 HOURS
Status: DISCONTINUED | OUTPATIENT
Start: 2017-05-31 | End: 2017-06-05 | Stop reason: HOSPADM

## 2017-05-31 RX ADMIN — PIPERACILLIN AND TAZOBACTAM 2.25 G: 2; .25 INJECTION, POWDER, LYOPHILIZED, FOR SOLUTION INTRAVENOUS; PARENTERAL at 05:16

## 2017-05-31 RX ADMIN — PIPERACILLIN AND TAZOBACTAM 2.25 G: 2; .25 INJECTION, POWDER, LYOPHILIZED, FOR SOLUTION INTRAVENOUS; PARENTERAL at 14:43

## 2017-05-31 RX ADMIN — OMEPRAZOLE 20 MG: 20 CAPSULE, DELAYED RELEASE ORAL at 20:19

## 2017-05-31 RX ADMIN — OXYCODONE HYDROCHLORIDE 10 MG: 10 TABLET ORAL at 14:40

## 2017-05-31 RX ADMIN — MAGNESIUM SULFATE IN WATER 4 G: 40 INJECTION, SOLUTION INTRAVENOUS at 10:44

## 2017-05-31 RX ADMIN — POTASSIUM CHLORIDE 40 MEQ: 1500 TABLET, EXTENDED RELEASE ORAL at 07:52

## 2017-05-31 RX ADMIN — POTASSIUM CHLORIDE 10 MEQ: 7.46 INJECTION, SOLUTION INTRAVENOUS at 23:25

## 2017-05-31 RX ADMIN — SUCRALFATE 1 G: 1 SUSPENSION ORAL at 23:46

## 2017-05-31 RX ADMIN — AMLODIPINE BESYLATE 5 MG: 5 TABLET ORAL at 08:04

## 2017-05-31 RX ADMIN — SUCRALFATE 1 G: 1 SUSPENSION ORAL at 17:33

## 2017-05-31 RX ADMIN — PREDNISONE 15 MG: 5 TABLET ORAL at 08:08

## 2017-05-31 RX ADMIN — SUCRALFATE 1 G: 1 SUSPENSION ORAL at 14:49

## 2017-05-31 RX ADMIN — SODIUM PHOSPHATE, MONOBASIC, MONOHYDRATE AND SODIUM PHOSPHATE, DIBASIC, ANHYDROUS 15 MMOL: 276; 142 INJECTION, SOLUTION INTRAVENOUS at 10:46

## 2017-05-31 RX ADMIN — FUROSEMIDE 20 MG: 40 TABLET ORAL at 08:04

## 2017-05-31 RX ADMIN — PIPERACILLIN AND TAZOBACTAM 2.25 G: 2; .25 INJECTION, POWDER, LYOPHILIZED, FOR SOLUTION INTRAVENOUS; PARENTERAL at 21:39

## 2017-05-31 RX ADMIN — OMEPRAZOLE 20 MG: 20 CAPSULE, DELAYED RELEASE ORAL at 08:04

## 2017-05-31 RX ADMIN — POTASSIUM CHLORIDE 10 MEQ: 7.46 INJECTION, SOLUTION INTRAVENOUS at 14:43

## 2017-05-31 RX ADMIN — POTASSIUM CHLORIDE 10 MEQ: 7.46 INJECTION, SOLUTION INTRAVENOUS at 15:56

## 2017-05-31 RX ADMIN — POTASSIUM CHLORIDE 10 MEQ: 7.46 INJECTION, SOLUTION INTRAVENOUS at 07:58

## 2017-05-31 RX ADMIN — OXYCODONE HYDROCHLORIDE 10 MG: 10 TABLET ORAL at 08:04

## 2017-05-31 RX ADMIN — HEPARIN SODIUM 5000 UNITS: 5000 INJECTION, SOLUTION INTRAVENOUS; SUBCUTANEOUS at 14:50

## 2017-05-31 RX ADMIN — HEPARIN SODIUM 5000 UNITS: 5000 INJECTION, SOLUTION INTRAVENOUS; SUBCUTANEOUS at 21:39

## 2017-05-31 ASSESSMENT — PAIN SCALES - GENERAL
PAINLEVEL_OUTOF10: 4
PAINLEVEL_OUTOF10: 0
PAINLEVEL_OUTOF10: 5
PAINLEVEL_OUTOF10: 6
PAINLEVEL_OUTOF10: 4
PAINLEVEL_OUTOF10: 8
PAINLEVEL_OUTOF10: 0
PAINLEVEL_OUTOF10: 4
PAINLEVEL_OUTOF10: 0
PAINLEVEL_OUTOF10: 4
PAINLEVEL_OUTOF10: 4

## 2017-05-31 NOTE — CARE PLAN
Problem: Knowledge Deficit  Goal: Knowledge of disease process/condition, treatment plan, diagnostic tests, and medications will improve  Pt to have EGD and colonoscopy today. Updated on POC. All questions answered.    Problem: Fluid Volume:  Goal: Will maintain balanced intake and output  Intervention: Monitor, educate, and encourage compliance with therapeutic intake of liquids  Pt receiving scheduled lasix. Lung sounds clear. Daily weigh taken and entered into Epic. No edema. Good pulses. Unable to carry out strict I/Os due to bowel prep (stool and urine mixed).

## 2017-05-31 NOTE — PROGRESS NOTES
Pulmonary Critical Care Progress Note        Date of Service: 5/31/17    Chief Complaint: N/V/D and septic shock    History of Present Illness: 51 y.o. female admitted for N/V/D and abdominal pain.     ROS:    Respiratory: negative,   Cardiac: negative,   GI: positive abdominal pain and positive diarrhea.    All other systems negative.    Interval Events:  24 hour interval history reviewed   Dr. Canseco (GI) consulted, scheduled colonoscopy, but K decreased. Pt placed on K scale and replaced.   NPO for procedure.   Pre-op pending.   Adequate urine output.   Neurologically unchanged.   SR, , given Labetalol x1.   No CXR today.     Yesterday's Events:  Positive PRC and negative toxin for C diff  Colonoscopy and EGD tomorrow  Hemoglobin trending up, no hematochezia, less abdominal pain  Alert and oriented x4  BP elevated this morning  On room air  Clear liquid diet  Frequent BMs  Afebrile  NS at 100  No CXR today  Zosyn  Per Dr. Garcia, holding Lisinopril      PFSH:  No change.    Physical Exam:  General:  Teary, talkative, answering all questions appropriately.  HEENT:  PERRl, EOMI, conjunctiva pink, sclera anicteric. Moist mucous membrames  CVS:  RRR, no murmur.  Respiratory:  Clear throughout.  Abdomen:  Soft, NT/ND. Bruising along lower quadrants.   Extremities:   Moving all extremities x4. 2+ dorsal pedal pulses.   Neuro/Psych:  Symmetrical facial expression, clear speech, NFE.      Respiratory:     Pulse Oximetry: 95 %        Invalid input(s): [id number]    HemoDynamics:  Pulse: 75  NIBP: 142/75 mmHg     Imaging: Available data reviewed        Neuro:  GCS Total Mary Coma Score: 15  Imaging: Available data reviewed    Fluids:  Intake/Output       05/29/17 0700 - 05/30/17 0659 05/30/17 0700 - 05/31/17 0659 05/31/17 0700 - 06/01/17 0659      0484-8806 6120-6423 Total 7380-9727 5878-5426 Total 9494-4733 2559-8644 Total       Intake    P.O.  1840  -- 1840  1000  1200 2200  --  -- --    P.O. 1840 --  1840 1000 1200 2200 -- -- --    I.V.  1300  1100 2400  600  -- 600  --  -- --    IV Volume (NS) 1100 1100 2200 400 -- 400 -- -- --    IV Piggyback Volume (IV Piggyback) 200 -- 200 200 -- 200 -- -- --    Total Intake 3140 1100 4240 1600 1200 2800 -- -- --       Output    Urine  1200  900 2100  --  -- --  --  -- --    Number of Times Voided 1 x 1 x 2 x 11 x 11 x 22 x -- -- --    Void (ml) 8397 167 6240 -- -- -- -- -- --    Stool  --  -- --  --  -- --  --  -- --    Number of Times Stooled 4 x 4 x 8 x 12 x 11 x 23 x -- -- --    Total Output 1089 416 5396 -- -- -- -- -- --       Net I/O     6570 461 6119 1600 1200 2800 -- -- --        Weight: 74.9 kg (165 lb 2 oz)  Recent Labs      17   0535  17   0504  17   0555   SODIUM  136  136  139   POTASSIUM  3.7  3.1*  3.2*   CHLORIDE  100  105  109   CO2  20  19*  19*   BUN  60*  38*  23*   CREATININE  4.10*  2.31*  1.38   CALCIUM  7.2*  6.7*  7.3*     GI/Nutrition:  Imaging: Available data reviewed  taking PO     Liver Function:  Recent Labs      17   0504  17   0555   ALTSGPT  38  29  61*   ASTSGOT  46*  46*  97*   ALKPHOSPHAT  179*  194*  262*   TBILIRUBIN  0.7  0.5  0.6   GAMMAGT   --    --   1059*   GLUCOSE  134*  180*  168*     Heme:  Recent Labs      17   0535  17   0555   RBC  3.53*  3.81*   HEMOGLOBIN  10.3*  11.2*   HEMATOCRIT  31.5*  34.1*   PLATELETCT  172  127*   IRON   --   103   FERRITIN   --   816.7*   TOTIRONBC   --   234*     Infectious Disease:  Temp  Av.6 °C (97.9 °F)  Min: 36.1 °C (97 °F)  Max: 37.4 °C (99.4 °F)  Micro: antibiotics reviewed, cultures pending and cultures reviewed  Recent Labs      17   0535  17   0504  17   0555   WBC  4.6*   --   2.7*   NEUTSPOLYS  86.00*   --   68.60   LYMPHOCYTES  10.60*   --   25.70   MONOCYTES  2.80   --   4.90   EOSINOPHILS  0.00   --   0.00   BASOPHILS  0.40   --   0.40   ASTSGOT  46*  46*  97*   ALTSGPT  38  29  61*   ALKPHOSPHAT  179*   194*  262*   TBILIRUBIN  0.7  0.5  0.6     Current Facility-Administered Medications   Medication Dose Frequency Provider Last Rate Last Dose   • predniSONE (DELTASONE) tablet 15 mg  15 mg DAILY Jair Jarrett M.D.   15 mg at 05/30/17 1030   • furosemide (LASIX) tablet 20 mg  20 mg Q DAY MALLY CorreiaDFiliberto   20 mg at 05/30/17 1000   • fleet enema 133 mL  1 Each Once Enrique Canseco M.D.       • labetalol (NORMODYNE,TRANDATE) injection 10 mg  10 mg Q6HRS PRN Jair Jarrett M.D.   10 mg at 05/30/17 2027   • amlodipine (NORVASC) tablet 5 mg  5 mg Q DAY Mario Garcia M.D.   5 mg at 05/30/17 0835   • piperacillin-tazobactam (ZOSYN) 2.25 g in  mL IVPB  2.25 g Q6HRS Karmen Baker, PHARMD   Stopped at 05/31/17 0546   • Respiratory Care per Protocol   Continuous RT Rosemary Scherer M.D.       • acetaminophen (TYLENOL) tablet 650 mg  650 mg Q6HRS PRN Rosemary Scherer M.D.       • promethazine (PHENERGAN) tablet 25 mg  25 mg Q6HRS PRN Rosemary Scherer M.D.       • oxycodone immediate-release (ROXICODONE) tablet 5 mg  5 mg Q4HRS PRN Kevin Armendariz M.D.   5 mg at 05/27/17 1815    Or   • oxycodone immediate release (ROXICODONE) tablet 10 mg  10 mg Q4HRS PRN Kevin Armendariz M.D.       • ondansetron (ZOFRAN) syringe/vial injection 4 mg  4 mg Q4HRS PRN Kevin Armendariz M.D.   4 mg at 05/27/17 1222   • heparin injection 5,000 Units  5,000 Units Q8HRS Rosemary Scherer M.D.   Stopped at 05/30/17 1400   • omeprazole (PRILOSEC) capsule 20 mg  20 mg BID He Ibrahim M.D.   20 mg at 05/30/17 2019   • prochlorperazine (COMPAZINE) injection 10 mg  10 mg Q6HRS PRN Kevin Armendariz M.D.   10 mg at 05/27/17 2109     Last reviewed on 5/27/2017  9:49 AM by Modesta Mirza, Pharmacy Int    Quality  Measures:  Medications reviewed, Labs reviewed, Radiology images reviewed and EKG reviewed  Kovacs catheter: No Kovacs      DVT Prophylaxis: Heparin  DVT prophylaxis - mechanical: SCDs  Ulcer prophylaxis: Not  indicated  Antibiotics: Treating active infection/contamination beyond 24 hours perioperative coverage      Problems/Plan:  Severe Sepsis with Septic Shock due to an abdominal source   - s/p sepsis protocol   - off pressors   - resolved  Acute Blood Loss Anemia from mild hematochezia   - GI consulted   - EGD and colonoscopy today  Hypokalemia   - likely from bowel prep   - replete as needed  HypoMg/Phos   - likely from diarrhea/bowel prep   - replete as needed  Acute Abdominal Pain    - ct abdo osh (-),    -  US with enlarged GG w/o stones  Acute Kidney Injury   - improved creatinine and UOP   - avoiding nephrotoxins   - likely related to ATN  Acute AG Metabolic Acidosis    - resolved  Lactic Acidosis    - resolved  Immunocompromised Host    - chronic steroids, humira, methotrexate, autoimmune disease   - wean steroids to home dose  Lupus   - appears stable  Leukocytosis    - improved  Hyponatremia    - resolved  Elevated liver enzymes    - improving, ct abdo and us reviewed  Hx of pancreatitis  Recent C Diff Colitis   - cont oral vanco    Stable from an ICU perspective and can be transferred to the medical floor under the care of R medicine.  The PCCM team will sign off the case upon transfer.  Please do not hesitate to call for any questions or concerns.    Discussed patient condition and risk of morbidity and/or mortality with RN, RT, Therapies, Pharmacy, R Gold resident, QA team and GI.    [id number]     Ruben GOODEN (Scribe), am scribing for, and in the presence of, Dasia Weaver M.D.    Electronically signed by: Ruben Nunez ([place]), 5/31/2017    Dasia GOODEN M.D. personally performed the services described in this documentation, as scribed by Ruben Nunez in my presence, and it is both accurate and complete.

## 2017-05-31 NOTE — CARE PLAN
Problem: Infection  Goal: Will remain free from infection  Intervention: Implement standard precautions and perform hand washing before and after patient contact  Pt in C.diff precautions, handwashing with soap and water performed before and after pt contact.       Problem: Pain Management  Goal: Pain level will decrease to patient’s comfort goal  Intervention: Follow pain managment plan developed in collaboration with patient and Interdisciplinary Team  Pt able to communicate when in pain, will medicate PRN

## 2017-05-31 NOTE — PROGRESS NOTES
Late entry  1105 - Transport at bedside to take patient to preop. IV fluids stopped. Pt saline locked. Chart with transport tech.

## 2017-05-31 NOTE — PROGRESS NOTES
1433 - Pt transferred to R105 accompanied by transport and PACU RN. Pt's IV fluids reconnected. Pt has no c/o pain. VSS.

## 2017-05-31 NOTE — OR SURGEON
Immediate Post-Operative Note      PreOp Diagnosis: hematochezia, heme positive stool, anemia     PostOp Diagnosis: ulcerative esophagitis, gastritis, duodenitis, cecal AVMs x three, hemorrhoids, hematochezia, heme positive stools, anemia     Procedure(s):  GASTROSCOPY - Wound Class: Dirty or Infected  COLONOSCOPY - Wound Class: Dirty or Infected    Surgeon(s):  Enrique Canseco M.D.    Anesthesiologist/Type of Anesthesia:  Anesthesiologist: Iam Madden M.D./General    Surgical Staff:  Circulator: Alyssa Espinoza R.N.  Endoscopy Technician: Miguel Almaraz  Relief Circulator: Kavin Finn R.N.    Specimen: A) duodenum, B) gastric    Estimated Blood Loss: < 100cc    Findings: 1) Actively oozing cecal avm.  Total of three were ablated using   APC, 2) LA CLASS Grade 4 ulcerative esophagitis, 3) gastritis, 4) Duodenitis    Complications: no immediate    Plan/Recommendations: 1) PPI BID, 2) Repeat EGD in 12 weeks to ensure healing, 3) Await path results, 4) Screening colonoscopy in 7-10 years.  5) Full liquid diet         5/31/2017 12:50 PM Enrique Canseco

## 2017-05-31 NOTE — OR NURSING
Report called to Kavin HASTINGS. Patient able to void in bedpan x1. No complaints of pain. Passing flatus.

## 2017-05-31 NOTE — PROCEDURES
DATE OF SERVICE:  05/31/2017    DATE OF PROCEDURE:  05/31/2017    PROCEDURES PERFORMED:  1.  Esophagogastroduodenoscopy with biopsy.  2.  Colonoscopy with ablation of bleeding arteriovenous malformation utilizing   argon plasma coagulation.    INSTRUMENTS UTILIZED:  1.  Olympus flexible forward viewing gastroscope.  2.  Olympus flexible variable stiffness adult colonoscope.    CONSENT:  Full RBA discussion held prior to the procedure.    SEDATION:  Provided by Dr. Madden.    TOLERANCE:  Good.    LAXATIVE PREPARATION:  MoviPrep.    QUALITY OF PREPARATION:  Fair, adequate.    PATHOLOGY SPECIMENS:  A.  Duodenal biopsies.  B.  Gastric biopsies.    DETAILS:  The Olympus flexible forward viewing gastroscope was advanced per   the oral route into the esophagus.  The esophageal mucosa was noted to be   ulcerated in a circumferential fashion from about 30 cm distal to the incisors   all the way down to the gastroesophageal junction at 38 cm from the incisors.    There was no active bleeding.  The instrument was passed into the stomach   where air was insufflated and the gastric mucosa inspected including a   retroflexed view of the gastric cardia.  In the antrum, there was erythema   with a few scattered erosions.  The instrument was passed through the patent   pyloric channel into the duodenum.  There were erosions and some erythema and   edema within the duodenal bulb.  The second and third portions of the duodenum   appeared unremarkable.  Biopsies were obtained from the first, second and   third portions of the duodenum.  The instrument was then pulled back into the   stomach.  Biopsies were obtained from the antrum and body.    Next, we conducted colonoscopy.  External anal exam revealed small nonbleeding   hemorrhoids and mild perianal erythema.  Digital rectal exam revealed normal   resting anal sphincter tone and no palpable rectal masses.  The colonoscope   was introduced per anus into the rectum.  The instrument was  carefully   advanced to the cecum.  In the cecum, there were 3 approximately 3 mm   arteriovenous malformations.  One of these was actively oozing.  Utilizing   argon plasma coagulation set for the right colon, these were ablated.  The   instrument was passed into the terminal ileum.  The distal terminal ileum   appeared normal and there was clear to yellow bilious fluid flowing.  Upon   withdrawal of the colonoscope, the mucosa was carefully inspected and was   unremarkable.  Once in the rectum, retroflexion was completed, which revealed   grade 2 nonbleeding internal hemorrhoids.    No immediate complications.    IMPRESSIONS AND FINDINGS:  1.  Colonic arteriovenous malformations (cecal AVMs), 3, status post ablation.    One of these was actively oozing.  Possible source of the patient's   Hemoccult positivity and a contributor to her anemia.  2.  Ulcerative esophagitis, Sullivan classification grade D, severe.  Also   a possible contributor to the patient's anemia and Hemoccult positivity.  No   active bleeding.  3.  Suspected gastritis, biopsies pending.  4.  Suspected duodenitis, biopsies pending.    PLAN AND RECOMMENDATIONS:  1.  Observe for any adverse events from today's procedure.  2.  Await pathology results.  3.  Proton pump inhibitor therapy twice daily until such time as the patient   follows up in clinic.  4.  I recommend repeat EGD in about 8-12 weeks.  I will defer to the patient's   primary gastroenterologist for timing.  5.  Full liquid diet at this time and may advance to soft diet as tolerated.  6.  Gastroenterology to sign off at this time.  Please contact us if any   questions or concerns.  I will plan to contact the patient with pathology   results in 1 week.  7.  Recommend average risk screening colonoscopy in 7-10 years.       ____________________________________     MD LYNN GOODSON / VICTOR HUGO    DD:  05/31/2017 13:00:17  DT:  05/31/2017 16:12:06    D#:  [id number]  Job#:   [id number]    cc: GABO SCHAEFER MD, SULMA AUGUSTINE MD

## 2017-05-31 NOTE — PROGRESS NOTES
[facility] ICU Progress Note      Admit Date: 5/27/2017  ICU Day: 5    Resident(s): Pollo Sam M.D.  Attending: MARCOS DOYLE/ Dr. Weaver    Date & Time:   5/31/2017   1:22 PM       Patient ID:    Name:             Brandi Leggett   YOB: 1965  Age:                 51 y.o.  female   MRN:               [id number]    Diagnosis:  Septic shock from ?abd source source with multiorgan failure - resolved   Abdominal pain, nausea, vomiting - improved  C diff colitis  Severe anion gap metabolic acidosis /lactic  - improving  Non oliguric acute kidney injury  - improving  Rheumatoid arthritis    SLE  H/o alcohol abuse (quit 3/2017)  Hepatic steatosis    Elevated transaminase     HPI:  Ms Leggett is a 51 yrs old female with PMH of SLE, rheumatoid arthritis, recurrent pancreatitis, alcohol abuse (quit 3/2017), was transferred from [facility] for septic shock, with lactic acid at 6. She was on levophed through peripheral IV line, s/p empiric antibiotics, IVF.    Patient complains of abdominal pain, nausea, vomiting, loose watery stools for the last 3 days, she was unable to keep anything down. She ran out of medications for the last 2 weeks, however states that she took 15 mg prednisone (home dose) yesterday morning.    Denies blood in stool, urine, chest pain, palpitations, focal weakness.    States that she was diagnosed with C diff on 1/2017.     Consultants:  Intensivist  Surgery  GI    Interval Events:    05/31/17: H&H dropped 2 points.  K low, repleted.  Plan for CVC placement.  Going for colonoscopy/EGD today.  Remains NPO.  No change in neurologic status.  BP elevated, received IV labetalol once.  Transfer pending, [facility].       PHYSICAL EXAM  Gen: NAD, pleasant and cooperative  HEENT: NC/AT, no scleral icterus, no conjunctival injection, mucous membranes pink and moist.  Neck: Supple, no lymphadenopathy.  Cardiac: S1S2, RRR, no m/r/g, no JVD.  Respiratory: Decreased breath sounds  bilateral  Abdomen: BS+, tenderness over epigastric area without guarding or rigidity, NT/ND, no rebound/guarding, no palpable organomegaly.  Ext: No edema, 2+ DP pulses b/l.  Skin: Warm, dry. No rashes or erythema.   Neuro: AAOx4, no focal sensory or motor deficits.   Psych: Affect, mood, judgement normal.    Respiratory:     Respiration: 12, Pulse Oximetry: 97 %    Chest Tube Drains:          Invalid input(s): [id number]    HemoDynamics:  Pulse: (!) 51, Heart Rate (Monitored): (!) 51 Blood Pressure: (!) 163/90 mmHg, NIBP: 125/65 mmHg            Fluids:    Date 05/31/17 0700 - 06/01/17 0659   Shift 4667-4314 1564-4748 1275-1489 24 Hour Total   I  N  T  A  K  E   I.V. 1900   1900      Crystalloid Intake 1700   1700      IV Piggyback Volume (IV Piggyback) 200   200    Shift Total 1900   1900   O  U  T  P  U  T   Urine 0   0      Number of Times Voided 4 x   4 x      Void (ml) 0   0    Stool          Number of Times Stooled 4 x   4 x    Blood 0   0      Est. Blood Loss (mL) 0   0    Shift Total 0   0   NET 1900   1900        Intake/Output Summary (Last 24 hours) at 05/29/17 1206  Last data filed at 05/29/17 1200   Gross per 24 hour   Intake   2945 ml   Output    500 ml   Net   2445 ml       Weight: 74.9 kg (165 lb 2 oz)  Body mass index is 29.26 kg/(m^2).    Recent Labs      05/30/17   0555  05/31/17   0532  05/31/17   0955  05/31/17   1100   SODIUM  139  139  135   --    POTASSIUM  3.2*  2.4*  6.5*  3.1*   CHLORIDE  109  110  109   --    CO2  19*  19*  19*   --    BUN  23*  15  13   --    CREATININE  1.38  0.88  0.91   --    MAGNESIUM   --   1.0*   --    --    PHOSPHORUS   --   1.7*   --    --    CALCIUM  7.3*  6.7*  6.6*   --        GI/Nutrition:  Recent Labs      05/29/17   0504  05/30/17   0555  05/31/17   0532  05/31/17   0955   ALTSGPT  29  61*  70*   --    ASTSGOT  46*  97*  104*   --    ALKPHOSPHAT  194*  262*  245*   --    TBILIRUBIN  0.5  0.6  0.6   --    GAMMAGT   --   1059*   --    --    GLUCOSE  180*   "168*  110*  128*       Heme:  Recent Labs      17   0555  17   0532   RBC  3.81*  3.11*   HEMOGLOBIN  11.2*  9.0*   HEMATOCRIT  34.1*  28.5*   PLATELETCT  127*  115*   IRON  103   --    FERRITIN  816.7*   --    TOTIRONBC  234*   --        Infectious Disease:  Temp  Av.7 °C (98.1 °F)  Min: 36.1 °C (97 °F)  Max: 37.4 °C (99.4 °F)  Recent Labs      17   0504  17   0555  17   0532   WBC   --   2.7*  5.1   NEUTSPOLYS   --   68.60  51.90   LYMPHOCYTES   --   25.70  37.80   MONOCYTES   --   4.90  8.50   EOSINOPHILS   --   0.00  0.80   BASOPHILS   --   0.40  0.40   ASTSGOT  46*  97*  104*   ALTSGPT  29  61*  70*   ALKPHOSPHAT  194*  262*  245*   TBILIRUBIN  0.5  0.6  0.6       Meds:  • potassium chloride SA  40 mEq     • potassium chloride in water  10 mEq Stopped (17 1105)   • magnesium sulfate  4 g 4 g (17 1044)   • K+ Scale: Goal of 4.5  1 Each     • sucralfate  1 g     • predniSONE  15 mg     • furosemide  20 mg     • labetalol  10 mg     • amlodipine  5 mg     • piperacillin-tazobactam  2.25 g Stopped (17 0546)   • Respiratory Care per Protocol       • acetaminophen  650 mg     • promethazine  25 mg     • oxycodone immediate-release  5 mg      Or   • oxycodone immediate-release  10 mg     • ondansetron  4 mg     • heparin  5,000 Units     • omeprazole  20 mg     • prochlorperazine  10 mg          Problem and Plan:    Septic shock from ?abd source source with multiorgan failure - resolved   Abdominal pain, nausea, vomiting - improved  C diff colitis  - Cdiff PCR+, toxin AB NGT ;    - h/o C -diff colitis 2017. PO Vanco DC'd with negative toxin study.   - CT abd/pelvis at outside facility without contrast negative for intraabdominal pathology    - USG shows \"distended gall bladder, No evidence of gallstone or biliary ductal dilatation, Enlarged echogenic liver consistent with fatty change versus hepatocellular dysfunction\".      Plan:  - Home prednisone 15 mg daily  - " No surgical intervention planned from Surgery ( Dr. Ibrahim), recommended conservative management.   - consulted for poss peritonitis.  - ABX: Zosyn  (start date: 05/27)  - received vitamin C, thiamine, solu cortef    - off levophed, VSS      FOBT positive  - s/p consultation with GI ( Dr. Canseco), plan for EGD/colonoscopy today  - Hb down a bit.  - continue     Non oliguric acute kidney injury  - improving, likely pre-renal from volume and unlikely secondary to lupus  - markedly improved kidney function with IV fluid therapy  - US renal with no abnormalities  - creatinine improved, near baseline.     Plan:  - nephro signed off, appreciate help  - continue to hold lisinopril for now and use other therapies for blood pressure management   - Can resume lisinopril once diet advanced from NPO for colonoscopy.   - potassium replacement with diuresis      Hypertension- not controlled  - Resume lisinopril when able to take PO; after colonoscopy/EGD.  - wean down steroid therapy and continue amlodipine 5 mg daily  - low dose lasix, 20 mg daily    Hypokalemia  - Aggressive K replacement needed after bowel prep for colonoscopy and lasix for diuresis.     Rheumatoid arthritis    SLE  - on home methotrexate, humira, prednisone   - restart prednisone, continue to hold methotrexate and humira in the setting of infection      Tophaceous gout    - on allopurinol    H/o alcohol abuse (quit 3/2017)  Hepatic steatosis    Elevated transaminase   - Maddrey score 11.3    - quit alcohol since her father passed away on 3/2017   - US abdomen shows distended gallbladder without evidence of biliary duct dilation or gallstone, fatty liver disease    Plan:  - CTM per GI, ordered fractionated alk phos  - no surgical intervention at this time, continued observation    Normocytic anemia   - H&H stable  - FOBT : positive  - GI consulted, will perform EGD/colonoscopy today    Hypovolemic hyponatremia    - resolved    Cervical DJD   - evaluated by  Neurosurgery awaiting surgery     Hyperlipidemia   - held home gemfibrozil, pravastatin, restrt once LFT stable    H/o hematuria   - ? Due to methotrexate use    - f/u Urology     DISPO: Transfer to medical floor, [facility].    CODE STATUS: Full code    Quality Measures:    DVT Prophylaxis: heparin  Ulcer Prophylaxis: omeprazole 20 mg BID  Antibiotics: zosyn      Imaging:  US-ABDOMEN COMPLETE SURVEY   Final Result      1.  Distended gallbladder. No evidence of gallstone or biliary ductal dilatation.      2.  Enlarged echogenic liver consistent with fatty change versus hepatocellular dysfunction.      DX-CHEST-PORTABLE (1 VIEW)   Final Result      Interval placement of a right IJ central line with the catheter tip overlying the cavoatrial junction.      OUTSIDE IMAGES-CT ABDOMEN /PELVIS   Preliminary Result      OUTSIDE IMAGES-DX CHEST   Preliminary Result

## 2017-06-01 PROBLEM — F10.10 ALCOHOL ABUSE: Chronic | Status: ACTIVE | Noted: 2017-06-01

## 2017-06-01 PROBLEM — K92.2 GI BLEEDING: Status: ACTIVE | Noted: 2017-06-01

## 2017-06-01 PROBLEM — R79.89 LOW VITAMIN D LEVEL: Chronic | Status: ACTIVE | Noted: 2017-06-01

## 2017-06-01 PROBLEM — I10 HTN (HYPERTENSION): Chronic | Status: ACTIVE | Noted: 2017-06-01

## 2017-06-01 PROBLEM — E87.8 ELECTROLYTE DEPLETION: Status: ACTIVE | Noted: 2017-06-01

## 2017-06-01 PROBLEM — M06.9 CHRONIC RHEUMATIC ARTHRITIS (HCC): Chronic | Status: ACTIVE | Noted: 2017-06-01

## 2017-06-01 LAB
25(OH)D3 SERPL-MCNC: 8 NG/ML (ref 30–100)
ALBUMIN SERPL BCP-MCNC: 2.5 G/DL (ref 3.2–4.9)
ALBUMIN/GLOB SERPL: 1.1 G/DL
ALP SERPL-CCNC: 233 U/L (ref 30–99)
ALT SERPL-CCNC: 62 U/L (ref 2–50)
ANION GAP SERPL CALC-SCNC: 10 MMOL/L (ref 0–11.9)
ANION GAP SERPL CALC-SCNC: 9 MMOL/L (ref 0–11.9)
ANION GAP SERPL CALC-SCNC: 9 MMOL/L (ref 0–11.9)
AST SERPL-CCNC: 62 U/L (ref 12–45)
BACTERIA BLD CULT: NORMAL
BACTERIA BLD CULT: NORMAL
BASOPHILS # BLD AUTO: 0.3 % (ref 0–1.8)
BASOPHILS # BLD: 0.02 K/UL (ref 0–0.12)
BILIRUB SERPL-MCNC: 0.5 MG/DL (ref 0.1–1.5)
BUN SERPL-MCNC: 7 MG/DL (ref 8–22)
BUN SERPL-MCNC: 8 MG/DL (ref 8–22)
BUN SERPL-MCNC: 8 MG/DL (ref 8–22)
CALCIUM SERPL-MCNC: 6.6 MG/DL (ref 8.5–10.5)
CALCIUM SERPL-MCNC: 6.7 MG/DL (ref 8.5–10.5)
CALCIUM SERPL-MCNC: 6.7 MG/DL (ref 8.5–10.5)
CHLORIDE SERPL-SCNC: 107 MMOL/L (ref 96–112)
CHLORIDE SERPL-SCNC: 108 MMOL/L (ref 96–112)
CHLORIDE SERPL-SCNC: 108 MMOL/L (ref 96–112)
CO2 SERPL-SCNC: 21 MMOL/L (ref 20–33)
CO2 SERPL-SCNC: 21 MMOL/L (ref 20–33)
CO2 SERPL-SCNC: 23 MMOL/L (ref 20–33)
CREAT SERPL-MCNC: 0.61 MG/DL (ref 0.5–1.4)
CREAT SERPL-MCNC: 0.67 MG/DL (ref 0.5–1.4)
CREAT SERPL-MCNC: 0.7 MG/DL (ref 0.5–1.4)
EOSINOPHIL # BLD AUTO: 0.14 K/UL (ref 0–0.51)
EOSINOPHIL NFR BLD: 2.1 % (ref 0–6.9)
ERYTHROCYTE [DISTWIDTH] IN BLOOD BY AUTOMATED COUNT: 41.7 FL (ref 35.9–50)
GFR SERPL CREATININE-BSD FRML MDRD: >60 ML/MIN/1.73 M 2
GLOBULIN SER CALC-MCNC: 2.3 G/DL (ref 1.9–3.5)
GLUCOSE SERPL-MCNC: 91 MG/DL (ref 65–99)
GLUCOSE SERPL-MCNC: 95 MG/DL (ref 65–99)
GLUCOSE SERPL-MCNC: 98 MG/DL (ref 65–99)
HCT VFR BLD AUTO: 28 % (ref 37–47)
HGB BLD-MCNC: 9.1 G/DL (ref 12–16)
IMM GRANULOCYTES # BLD AUTO: 0.07 K/UL (ref 0–0.11)
IMM GRANULOCYTES NFR BLD AUTO: 1.1 % (ref 0–0.9)
LYMPHOCYTES # BLD AUTO: 1.97 K/UL (ref 1–4.8)
LYMPHOCYTES NFR BLD: 30 % (ref 22–41)
MAGNESIUM SERPL-MCNC: 1.3 MG/DL (ref 1.5–2.5)
MCH RBC QN AUTO: 28.9 PG (ref 27–33)
MCHC RBC AUTO-ENTMCNC: 32.5 G/DL (ref 33.6–35)
MCV RBC AUTO: 88.9 FL (ref 81.4–97.8)
MONOCYTES # BLD AUTO: 0.5 K/UL (ref 0–0.85)
MONOCYTES NFR BLD AUTO: 7.6 % (ref 0–13.4)
NEUTROPHILS # BLD AUTO: 3.87 K/UL (ref 2–7.15)
NEUTROPHILS NFR BLD: 58.9 % (ref 44–72)
NRBC # BLD AUTO: 0 K/UL
NRBC BLD AUTO-RTO: 0 /100 WBC
PLATELET # BLD AUTO: 106 K/UL (ref 164–446)
PMV BLD AUTO: 10.1 FL (ref 9–12.9)
POTASSIUM SERPL-SCNC: 2.7 MMOL/L (ref 3.6–5.5)
POTASSIUM SERPL-SCNC: 2.8 MMOL/L (ref 3.6–5.5)
POTASSIUM SERPL-SCNC: 2.9 MMOL/L (ref 3.6–5.5)
PROT SERPL-MCNC: 4.8 G/DL (ref 6–8.2)
PTH-INTACT SERPL-MCNC: 105.3 PG/ML (ref 14–72)
RBC # BLD AUTO: 3.15 M/UL (ref 4.2–5.4)
SIGNIFICANT IND 70042: NORMAL
SIGNIFICANT IND 70042: NORMAL
SITE SITE: NORMAL
SITE SITE: NORMAL
SODIUM SERPL-SCNC: 138 MMOL/L (ref 135–145)
SODIUM SERPL-SCNC: 138 MMOL/L (ref 135–145)
SODIUM SERPL-SCNC: 140 MMOL/L (ref 135–145)
SOURCE SOURCE: NORMAL
SOURCE SOURCE: NORMAL
WBC # BLD AUTO: 6.6 K/UL (ref 4.8–10.8)
WBC STL QL MICRO: NORMAL

## 2017-06-01 PROCEDURE — 85025 COMPLETE CBC W/AUTO DIFF WBC: CPT

## 2017-06-01 PROCEDURE — 36415 COLL VENOUS BLD VENIPUNCTURE: CPT

## 2017-06-01 PROCEDURE — 700102 HCHG RX REV CODE 250 W/ 637 OVERRIDE(OP): Performed by: STUDENT IN AN ORGANIZED HEALTH CARE EDUCATION/TRAINING PROGRAM

## 2017-06-01 PROCEDURE — A9270 NON-COVERED ITEM OR SERVICE: HCPCS | Performed by: STUDENT IN AN ORGANIZED HEALTH CARE EDUCATION/TRAINING PROGRAM

## 2017-06-01 PROCEDURE — 83970 ASSAY OF PARATHORMONE: CPT

## 2017-06-01 PROCEDURE — 80048 BASIC METABOLIC PNL TOTAL CA: CPT | Mod: 91

## 2017-06-01 PROCEDURE — 700111 HCHG RX REV CODE 636 W/ 250 OVERRIDE (IP): Performed by: HOSPITALIST

## 2017-06-01 PROCEDURE — 700102 HCHG RX REV CODE 250 W/ 637 OVERRIDE(OP): Performed by: HOSPITALIST

## 2017-06-01 PROCEDURE — 700111 HCHG RX REV CODE 636 W/ 250 OVERRIDE (IP): Performed by: INTERNAL MEDICINE

## 2017-06-01 PROCEDURE — 82306 VITAMIN D 25 HYDROXY: CPT

## 2017-06-01 PROCEDURE — 770020 HCHG ROOM/CARE - TELE (206)

## 2017-06-01 PROCEDURE — 89055 LEUKOCYTE ASSESSMENT FECAL: CPT

## 2017-06-01 PROCEDURE — 700102 HCHG RX REV CODE 250 W/ 637 OVERRIDE(OP): Performed by: INTERNAL MEDICINE

## 2017-06-01 PROCEDURE — A9270 NON-COVERED ITEM OR SERVICE: HCPCS | Performed by: COLON & RECTAL SURGERY

## 2017-06-01 PROCEDURE — 700111 HCHG RX REV CODE 636 W/ 250 OVERRIDE (IP): Performed by: STUDENT IN AN ORGANIZED HEALTH CARE EDUCATION/TRAINING PROGRAM

## 2017-06-01 PROCEDURE — 700102 HCHG RX REV CODE 250 W/ 637 OVERRIDE(OP): Performed by: COLON & RECTAL SURGERY

## 2017-06-01 PROCEDURE — 99232 SBSQ HOSP IP/OBS MODERATE 35: CPT | Mod: GC | Performed by: HOSPITALIST

## 2017-06-01 PROCEDURE — 83735 ASSAY OF MAGNESIUM: CPT

## 2017-06-01 PROCEDURE — 80053 COMPREHEN METABOLIC PANEL: CPT

## 2017-06-01 PROCEDURE — A9270 NON-COVERED ITEM OR SERVICE: HCPCS | Performed by: INTERNAL MEDICINE

## 2017-06-01 PROCEDURE — A9270 NON-COVERED ITEM OR SERVICE: HCPCS | Performed by: HOSPITALIST

## 2017-06-01 RX ORDER — ERGOCALCIFEROL 1.25 MG/1
50000 CAPSULE ORAL
Status: DISCONTINUED | OUTPATIENT
Start: 2017-06-01 | End: 2017-06-05 | Stop reason: HOSPADM

## 2017-06-01 RX ORDER — CALCIUM CARBONATE 500(1250)
500 TABLET ORAL 2 TIMES DAILY WITH MEALS
Status: DISCONTINUED | OUTPATIENT
Start: 2017-06-01 | End: 2017-06-05 | Stop reason: HOSPADM

## 2017-06-01 RX ORDER — POTASSIUM CHLORIDE 20 MEQ/1
40 TABLET, EXTENDED RELEASE ORAL 2 TIMES DAILY
Status: DISCONTINUED | OUTPATIENT
Start: 2017-06-01 | End: 2017-06-04

## 2017-06-01 RX ORDER — MAGNESIUM SULFATE HEPTAHYDRATE 40 MG/ML
4 INJECTION, SOLUTION INTRAVENOUS ONCE
Status: COMPLETED | OUTPATIENT
Start: 2017-06-01 | End: 2017-06-01

## 2017-06-01 RX ADMIN — MAGNESIUM SULFATE HEPTAHYDRATE 4 G: 40 INJECTION, SOLUTION INTRAVENOUS at 10:23

## 2017-06-01 RX ADMIN — SUCRALFATE 1 G: 1 SUSPENSION ORAL at 16:35

## 2017-06-01 RX ADMIN — POTASSIUM CHLORIDE 40 MEQ: 1500 TABLET, EXTENDED RELEASE ORAL at 08:31

## 2017-06-01 RX ADMIN — SUCRALFATE 1 G: 1 SUSPENSION ORAL at 11:50

## 2017-06-01 RX ADMIN — SUCRALFATE 1 G: 1 SUSPENSION ORAL at 05:15

## 2017-06-01 RX ADMIN — OXYCODONE HYDROCHLORIDE 10 MG: 10 TABLET ORAL at 23:41

## 2017-06-01 RX ADMIN — POTASSIUM CHLORIDE 10 MEQ: 7.46 INJECTION, SOLUTION INTRAVENOUS at 01:55

## 2017-06-01 RX ADMIN — SUCRALFATE 1 G: 1 SUSPENSION ORAL at 23:41

## 2017-06-01 RX ADMIN — MAGNESIUM GLUCONATE 500 MG ORAL TABLET 400 MG: 500 TABLET ORAL at 08:30

## 2017-06-01 RX ADMIN — POTASSIUM CHLORIDE 10 MEQ: 7.46 INJECTION, SOLUTION INTRAVENOUS at 00:43

## 2017-06-01 RX ADMIN — OXYCODONE HYDROCHLORIDE 10 MG: 10 TABLET ORAL at 01:20

## 2017-06-01 RX ADMIN — ERGOCALCIFEROL 50000 UNITS: 1.25 CAPSULE ORAL at 16:35

## 2017-06-01 RX ADMIN — Medication 500 MG: at 08:31

## 2017-06-01 RX ADMIN — OMEPRAZOLE 20 MG: 20 CAPSULE, DELAYED RELEASE ORAL at 08:30

## 2017-06-01 RX ADMIN — Medication 500 MG: at 16:35

## 2017-06-01 RX ADMIN — OMEPRAZOLE 20 MG: 20 CAPSULE, DELAYED RELEASE ORAL at 21:41

## 2017-06-01 RX ADMIN — PREDNISONE 15 MG: 5 TABLET ORAL at 08:30

## 2017-06-01 RX ADMIN — HEPARIN SODIUM 5000 UNITS: 5000 INJECTION, SOLUTION INTRAVENOUS; SUBCUTANEOUS at 05:15

## 2017-06-01 RX ADMIN — AMLODIPINE BESYLATE 5 MG: 5 TABLET ORAL at 08:30

## 2017-06-01 RX ADMIN — POTASSIUM CHLORIDE 40 MEQ: 1500 TABLET, EXTENDED RELEASE ORAL at 21:41

## 2017-06-01 RX ADMIN — FUROSEMIDE 20 MG: 40 TABLET ORAL at 08:30

## 2017-06-01 ASSESSMENT — ENCOUNTER SYMPTOMS
TINGLING: 0
CHILLS: 0
DIAPHORESIS: 0
SPUTUM PRODUCTION: 0
DIZZINESS: 0
HEADACHES: 0
HEARTBURN: 0
NECK PAIN: 0
HEMOPTYSIS: 0
FEVER: 0
WEAKNESS: 1
MYALGIAS: 0
DOUBLE VISION: 0
ABDOMINAL PAIN: 1
CONSTIPATION: 0
BLOOD IN STOOL: 1
BLURRED VISION: 0
DIARRHEA: 1
VOMITING: 0
NAUSEA: 1
PALPITATIONS: 0
DEPRESSION: 0

## 2017-06-01 ASSESSMENT — PAIN SCALES - GENERAL
PAINLEVEL_OUTOF10: 4
PAINLEVEL_OUTOF10: 4
PAINLEVEL_OUTOF10: 7
PAINLEVEL_OUTOF10: 4
PAINLEVEL_OUTOF10: 4

## 2017-06-01 ASSESSMENT — LIFESTYLE VARIABLES: SUBSTANCE_ABUSE: 0

## 2017-06-01 NOTE — PROGRESS NOTES
notified of critical Calcium at 6.8, corrected calcium 7.9 acceptable per MD. Potasium 3.2 K scale followed.

## 2017-06-01 NOTE — PROGRESS NOTES
Mariana from Lab called with critical result of Calcium at 6.7. Critical lab result read back to Mariana.   Dr. Garza notified of critical lab result at 0446.  Critical lab result read back by  0446.

## 2017-06-01 NOTE — ASSESSMENT & PLAN NOTE
- on  prednisone 15 mg daily,   - continue to hold methotrexate and humira in the setting of infection

## 2017-06-01 NOTE — ASSESSMENT & PLAN NOTE
Likely related to GI bleeding  Started cholystramine on 6/3/17  IV fluid and monitor H&H  Resume Vanco PO if needed.

## 2017-06-01 NOTE — PROGRESS NOTES
Icylin from Lab called with critical result of 6.6 calcium at 750. Critical lab result read back to icylin.   This critical lab result is within parameters established MD for this patient

## 2017-06-01 NOTE — DISCHARGE PLANNING
Care Transition Team Assessment    Met with pt at bedside. Pt states she will discharge home with no needs. Pt receives transportation from [facility]. Pt has  for support.    Information Source  Orientation : Oriented x 4  Information Given By: Patient  Informant's Name: Brandi Leggett  Who is responsible for making decisions for patient? : Patient    Readmission Evaluation  Is this a readmission?: No    Elopement Risk  Legal Hold: No  Ambulatory or Self Mobile in Wheelchair: Yes  Disoriented: No  Psychiatric Symptoms: None  History of Wandering: No  Elopement this Admit: No  Vocalizing Wanting to Leave: No  Displays Behaviors, Body Language Wanting to Leave: No-Not at Risk for Elopement  Elopement Risk: Not at Risk for Elopement    Interdisciplinary Discharge Planning  Does Admitting Nurse Feel This Could be a Complex Discharge?: Yes  Primary Care Physician: Dr Jules Blackburn  Lives with - Patient's Self Care Capacity: Spouse  Patient or legal guardian wants to designate a caregiver (see row info): Yes  Caregiver name: Gabriela Goyal  Caregiver relationship to patient:   Caregiver contact info: [phone]  ([facility]) Authorization for Release of Health Information has been completed: Yes  Support Systems: Spouse / Significant Other, Friends / Neighbors, Family Member(s)  Housing / Facility: [address]  Do You Take your Prescribed Medications Regularly: Yes  Able to Return to Previous ADL's: Future Time w/Therapy  Mobility Issues: Yes  Prior Services: None  Patient Expects to be Discharged to:: home  Assistance Needed: Unknown at this Time  Durable Medical Equipment: Walker    Discharge Preparedness  What is your plan after discharge?: Home with help  What are your discharge supports?: Spouse  Prior Functional Level: Ambulatory, Independent with Activities of Daily Living, Independent with Medication Management, Uses Walker  Difficulity with ADLs: Walking  Difficulty with ADLs Comment: Pt uses walker  Difficulity  with IADLs: Driving  Difficulity with IADL Comments: Pt states she doesn't drive    Functional Assesment  Prior Functional Level: Ambulatory, Independent with Activities of Daily Living, Independent with Medication Management, Uses Walker    Finances  Financial Barriers to Discharge: No  Prescription Coverage: Yes (Pharmacy: [facility], [place])    Vision / Hearing Impairment  Vision Impairment : Yes  Right Eye Vision: Impaired, Wears Glasses  Left Eye Vision: Impaired, Wears Glasses  Hearing Impairment : No    Values / Beliefs / Concerns  Values / Beliefs Concerns : No    Advance Directive  Advance Directive?: None  Advance Directive offered?: AD Booklet refused    Domestic Abuse  Have you ever been the victim of abuse or violence?: No  Physical Abuse or Sexual Abuse: No  Verbal Abuse or Emotional Abuse: No  Possible Abuse Reported to:: Not Applicable    Psychological Assessment  History of Substance Abuse: None  History of Psychiatric Problems: No  Non-compliant with Treatment: No  Newly Diagnosed Illness: No    Discharge Risks or Barriers  Discharge risks or barriers?: No    Anticipated Discharge Information  Anticipated discharge disposition: Home  Discharge Address: [facility] [address] [id number]  Discharge Contact Phone Number: [phone]

## 2017-06-01 NOTE — PROGRESS NOTES
Cross Coverage Note:  -----------------------------    RN paged us regarding Ca level falling down to 6.7  However Albumin is low 2.5 and corrected calcium is 7.8  Patient is asymptomatic and QTc interval is normal  Tele shows normal sinus Rhythm    Plan:  Ordered Lab draws for VitD, and PTH, as levels were borderline lower side 2 yrs ago (Vit D 29) in 7/2016    Ordered Calcium Carbonate 500 mg bid  Consider replacing Vit D

## 2017-06-01 NOTE — PROGRESS NOTES
Assumed pt care at 0715.  Received report from edyta RN.  Assessment completed.  Pt AAOx4.  Pt has no comlaints of pain at this time.  No other s/s of discomfort or distress. Pt ambulates up self.  Bed in lowest position and locked.  Special contact precautions in place.  Pt calls appropriately.  Treaded socks in place, call light within reach and staff numbers provided.  Pt needs met at this time.

## 2017-06-01 NOTE — ASSESSMENT & PLAN NOTE
- on  prednisone 15 mg daily,   - continue to hold methotrexate and humira at the hospital.   - Resume them on discharge  - Follow up.

## 2017-06-01 NOTE — PROGRESS NOTES
Mariana from Lab called with critical result of Calcium 6.8, Albumin 2.6 at 2225. Critical lab result read back to Mariana.   Dr. Buckley notified of critical lab result at 2229.  Critical lab result read back by Dr. Buckley.

## 2017-06-01 NOTE — ASSESSMENT & PLAN NOTE
Was given vanco PO for  3 5/17>>5/29 days and Zosyn  for 5 days 5/27>>5/31  No source of infection.   Resolved.   No surgical intervention planned from Surgery ( Dr. Ibrahim), recommended conservative management.  She still has diarrhea 3-4 loose stool black.   Likely infection intra abdomen, adrenal failure(she was on prednisone and ran it out before admission.  Stable now no fever and no hypotension.    Monitor H&H  Continue on prednisone  No abx at this time and resume zosyn or vanco PO if needed.

## 2017-06-01 NOTE — PROGRESS NOTES
Zev from Lab called with critical result of potassium at 2.7 and calcium at 6.7. Critical lab result read back to Zev.   Dr. Perez notified of critical lab result at 0647.  Critical lab result read back by Dr. Perez.

## 2017-06-01 NOTE — ASSESSMENT & PLAN NOTE
Likely dehydration and lupus nephritis.  nephro was on board >>did not need dialysis.   Cr: 0.8>>0.6  On prednisone and fluid.

## 2017-06-01 NOTE — CARE PLAN
Problem: Safety  Goal: Will remain free from falls  Intervention: Implement fall precautions  Educated patient on use of call light, no slip socks on, bed lowest position. All needs attended to. Patient verbalized understanding.       Problem: Infection  Goal: Will remain free from infection  Intervention: Implement standard precautions and perform hand washing before and after patient contact  RN will follow protocols and necessary steps to minimize the spread of infection. RN educated pt and and any visitors on proper hand hygiene.

## 2017-06-01 NOTE — CARE PLAN
Problem: Safety  Goal: Will remain free from injury  Intervention: Provide assistance with mobility  Call light within reach, treaded socks in place, bed in lowest position and locked.  Hourly rounding in progress.       Problem: Pain Management  Goal: Pain level will decrease to patient’s comfort goal  Intervention: Follow pain managment plan developed in collaboration with patient and Interdisciplinary Team  Pt denies pain at this time.

## 2017-06-02 ENCOUNTER — APPOINTMENT (OUTPATIENT)
Dept: RADIOLOGY | Facility: MEDICAL CENTER | Age: 52
DRG: 871 | End: 2017-06-02
Attending: HOSPITALIST
Payer: MEDICAID

## 2017-06-02 LAB
ALBUMIN SERPL BCP-MCNC: 2.7 G/DL (ref 3.2–4.9)
ALBUMIN/GLOB SERPL: 1.3 G/DL
ALP SERPL-CCNC: 193 U/L (ref 30–99)
ALT SERPL-CCNC: 46 U/L (ref 2–50)
ANION GAP SERPL CALC-SCNC: 8 MMOL/L (ref 0–11.9)
AST SERPL-CCNC: 37 U/L (ref 12–45)
BASOPHILS # BLD AUTO: 0.5 % (ref 0–1.8)
BASOPHILS # BLD: 0.03 K/UL (ref 0–0.12)
BILIRUB SERPL-MCNC: 0.5 MG/DL (ref 0.1–1.5)
BUN SERPL-MCNC: 6 MG/DL (ref 8–22)
CALCIUM SERPL-MCNC: 7 MG/DL (ref 8.5–10.5)
CHLORIDE SERPL-SCNC: 106 MMOL/L (ref 96–112)
CO2 SERPL-SCNC: 25 MMOL/L (ref 20–33)
CREAT SERPL-MCNC: 0.43 MG/DL (ref 0.5–1.4)
EOSINOPHIL # BLD AUTO: 0.09 K/UL (ref 0–0.51)
EOSINOPHIL NFR BLD: 1.5 % (ref 0–6.9)
ERYTHROCYTE [DISTWIDTH] IN BLOOD BY AUTOMATED COUNT: 41.5 FL (ref 35.9–50)
GFR SERPL CREATININE-BSD FRML MDRD: >60 ML/MIN/1.73 M 2
GGT SERPL-CCNC: 881 U/L (ref 7–34)
GLOBULIN SER CALC-MCNC: 2.1 G/DL (ref 1.9–3.5)
GLUCOSE SERPL-MCNC: 90 MG/DL (ref 65–99)
HCT VFR BLD AUTO: 25.7 % (ref 37–47)
HGB BLD-MCNC: 8.3 G/DL (ref 12–16)
IMM GRANULOCYTES # BLD AUTO: 0.09 K/UL (ref 0–0.11)
IMM GRANULOCYTES NFR BLD AUTO: 1.5 % (ref 0–0.9)
LYMPHOCYTES # BLD AUTO: 2.12 K/UL (ref 1–4.8)
LYMPHOCYTES NFR BLD: 35.9 % (ref 22–41)
MAGNESIUM SERPL-MCNC: 1.5 MG/DL (ref 1.5–2.5)
MCH RBC QN AUTO: 28.6 PG (ref 27–33)
MCHC RBC AUTO-ENTMCNC: 32.3 G/DL (ref 33.6–35)
MCV RBC AUTO: 88.6 FL (ref 81.4–97.8)
MONOCYTES # BLD AUTO: 0.58 K/UL (ref 0–0.85)
MONOCYTES NFR BLD AUTO: 9.8 % (ref 0–13.4)
NEUTROPHILS # BLD AUTO: 2.99 K/UL (ref 2–7.15)
NEUTROPHILS NFR BLD: 50.8 % (ref 44–72)
NRBC # BLD AUTO: 0 K/UL
NRBC BLD AUTO-RTO: 0 /100 WBC
PHOSPHATE SERPL-MCNC: 1.7 MG/DL (ref 2.5–4.5)
PLATELET # BLD AUTO: 107 K/UL (ref 164–446)
PMV BLD AUTO: 10.5 FL (ref 9–12.9)
POTASSIUM SERPL-SCNC: 2.8 MMOL/L (ref 3.6–5.5)
PROT SERPL-MCNC: 4.8 G/DL (ref 6–8.2)
RBC # BLD AUTO: 2.9 M/UL (ref 4.2–5.4)
SODIUM SERPL-SCNC: 139 MMOL/L (ref 135–145)
WBC # BLD AUTO: 5.9 K/UL (ref 4.8–10.8)

## 2017-06-02 PROCEDURE — 700102 HCHG RX REV CODE 250 W/ 637 OVERRIDE(OP): Performed by: INTERNAL MEDICINE

## 2017-06-02 PROCEDURE — A9270 NON-COVERED ITEM OR SERVICE: HCPCS | Performed by: INTERNAL MEDICINE

## 2017-06-02 PROCEDURE — 78227 HEPATOBIL SYST IMAGE W/DRUG: CPT

## 2017-06-02 PROCEDURE — 99231 SBSQ HOSP IP/OBS SF/LOW 25: CPT | Mod: GC | Performed by: HOSPITALIST

## 2017-06-02 PROCEDURE — 85025 COMPLETE CBC W/AUTO DIFF WBC: CPT

## 2017-06-02 PROCEDURE — 700111 HCHG RX REV CODE 636 W/ 250 OVERRIDE (IP): Performed by: STUDENT IN AN ORGANIZED HEALTH CARE EDUCATION/TRAINING PROGRAM

## 2017-06-02 PROCEDURE — A9270 NON-COVERED ITEM OR SERVICE: HCPCS | Performed by: COLON & RECTAL SURGERY

## 2017-06-02 PROCEDURE — 36415 COLL VENOUS BLD VENIPUNCTURE: CPT

## 2017-06-02 PROCEDURE — 770020 HCHG ROOM/CARE - TELE (206)

## 2017-06-02 PROCEDURE — 82977 ASSAY OF GGT: CPT

## 2017-06-02 PROCEDURE — 700111 HCHG RX REV CODE 636 W/ 250 OVERRIDE (IP): Performed by: INTERNAL MEDICINE

## 2017-06-02 PROCEDURE — 84100 ASSAY OF PHOSPHORUS: CPT

## 2017-06-02 PROCEDURE — 700102 HCHG RX REV CODE 250 W/ 637 OVERRIDE(OP): Performed by: STUDENT IN AN ORGANIZED HEALTH CARE EDUCATION/TRAINING PROGRAM

## 2017-06-02 PROCEDURE — 80053 COMPREHEN METABOLIC PANEL: CPT

## 2017-06-02 PROCEDURE — 83735 ASSAY OF MAGNESIUM: CPT

## 2017-06-02 PROCEDURE — 700102 HCHG RX REV CODE 250 W/ 637 OVERRIDE(OP): Performed by: COLON & RECTAL SURGERY

## 2017-06-02 PROCEDURE — 700105 HCHG RX REV CODE 258: Performed by: INTERNAL MEDICINE

## 2017-06-02 PROCEDURE — A9270 NON-COVERED ITEM OR SERVICE: HCPCS | Performed by: STUDENT IN AN ORGANIZED HEALTH CARE EDUCATION/TRAINING PROGRAM

## 2017-06-02 RX ORDER — SODIUM CHLORIDE AND POTASSIUM CHLORIDE 300; 900 MG/100ML; MG/100ML
INJECTION, SOLUTION INTRAVENOUS CONTINUOUS
Status: DISCONTINUED | OUTPATIENT
Start: 2017-06-02 | End: 2017-06-02

## 2017-06-02 RX ADMIN — Medication 500 MG: at 17:31

## 2017-06-02 RX ADMIN — OMEPRAZOLE 20 MG: 20 CAPSULE, DELAYED RELEASE ORAL at 07:58

## 2017-06-02 RX ADMIN — SUCRALFATE 1 G: 1 SUSPENSION ORAL at 17:31

## 2017-06-02 RX ADMIN — PREDNISONE 15 MG: 5 TABLET ORAL at 07:58

## 2017-06-02 RX ADMIN — SUCRALFATE 1 G: 1 SUSPENSION ORAL at 23:24

## 2017-06-02 RX ADMIN — POTASSIUM CHLORIDE 40 MEQ: 1500 TABLET, EXTENDED RELEASE ORAL at 21:05

## 2017-06-02 RX ADMIN — MAGNESIUM GLUCONATE 500 MG ORAL TABLET 400 MG: 500 TABLET ORAL at 07:58

## 2017-06-02 RX ADMIN — SUCRALFATE 1 G: 1 SUSPENSION ORAL at 05:04

## 2017-06-02 RX ADMIN — POTASSIUM CHLORIDE: 2 INJECTION, SOLUTION, CONCENTRATE INTRAVENOUS at 10:18

## 2017-06-02 RX ADMIN — DIBASIC SODIUM PHOSPHATE, MONOBASIC POTASSIUM PHOSPHATE AND MONOBASIC SODIUM PHOSPHATE 1 TABLET: 852; 155; 130 TABLET ORAL at 17:31

## 2017-06-02 RX ADMIN — POTASSIUM CHLORIDE 40 MEQ: 1500 TABLET, EXTENDED RELEASE ORAL at 07:58

## 2017-06-02 RX ADMIN — OMEPRAZOLE 20 MG: 20 CAPSULE, DELAYED RELEASE ORAL at 21:05

## 2017-06-02 RX ADMIN — AMLODIPINE BESYLATE 5 MG: 5 TABLET ORAL at 08:03

## 2017-06-02 RX ADMIN — DIBASIC SODIUM PHOSPHATE, MONOBASIC POTASSIUM PHOSPHATE AND MONOBASIC SODIUM PHOSPHATE 1 TABLET: 852; 155; 130 TABLET ORAL at 23:24

## 2017-06-02 RX ADMIN — Medication 500 MG: at 07:57

## 2017-06-02 ASSESSMENT — LIFESTYLE VARIABLES: SUBSTANCE_ABUSE: 0

## 2017-06-02 ASSESSMENT — ENCOUNTER SYMPTOMS
DIAPHORESIS: 0
BLOOD IN STOOL: 1
DIZZINESS: 0
DOUBLE VISION: 0
HEMOPTYSIS: 0
NECK PAIN: 0
CHILLS: 0
VOMITING: 0
BLURRED VISION: 0
TINGLING: 0
NAUSEA: 1
ABDOMINAL PAIN: 1
FEVER: 0
HEARTBURN: 0
PALPITATIONS: 0
MYALGIAS: 0
WEAKNESS: 1
DIARRHEA: 1
SPUTUM PRODUCTION: 0
CONSTIPATION: 0
HEADACHES: 0
DEPRESSION: 0

## 2017-06-02 ASSESSMENT — PAIN SCALES - GENERAL
PAINLEVEL_OUTOF10: 4
PAINLEVEL_OUTOF10: 5

## 2017-06-02 NOTE — PROGRESS NOTES
"                               [facility] Internal Medicine Interval Note    Name Brandi Leggett     1965   Age/Sex 51 y.o. female   MRN [id number]   Code Status Full     After 5PM or if no immediate response to page, please call for cross-coverage  Attending/Team: Dr Ferguson Call [phone] to page   1st Call - Day Intern (R1):   Dr Perez 2nd Call - Day Sr. Resident (R2/R3):   Dr Herrera         Chief complaint/ reason for interval visit (Primary Diagnosis)   Abdominal pain/N/V/septic shock.     Interval Problem Daily Status Update:  In ICU from >> then floor tele .  Septic shock resolved but and the patient feels better today and more energy but still has diarrhea, no fever.   HIDA pending.  Hb dropped from 9>8 >>follow up.   Started with regular diet and phosphorus supplementation PO.       Principal Problem:    Septic shock (CMS-HCC) POA: Yes      Overview: - WBC: 16 on admission Lactic acid: 6      - needed norepinephrine for low blood pressure in the ICU.       - Blood culture negative.       - Cdiff PCR+, toxin AB NGT ;        - CT abd/pelvis at outside facility without contrast negative for       intraabdominal pathology        - USG shows \"distended gall bladder, No evidence of gallstone or biliary       ductal dilatation, Enlarged echogenic liver consistent with fatty change       versus hepatocellular dysfunction\".        - Cortizone 10 on admission.   Active Problems:    Diarrhea POA: Yes      Overview: Cdiff PCR+, toxin AB NGT.      No WBC in stool.     Abdominal pain POA: Yes      Overview: - US abdomen shows distended gallbladder without evidence of biliary duct       dilation or gallstone, fatty liver disease      - AST: 46>>>61      - ALT: 36>>62      - Al phosphatase: 179>>>233      - GGT: 1059    Electrolyte depletion POA: Yes      Overview: K: 2.7 and M.6 and Ca: 8 corrected.     GI bleeding POA: Yes      Overview: Hb: 11 on admission >>>9       MCV: 88       FOBT: " Positive.       EGD/Colonoscopy on 6/1/17: ulcerative esophagitis, gastritis, duodenitis,       cecal AVMs x three, hemorrhoids.     SLE (systemic lupus erythematosus) (CMS-[facility]) (Chronic) POA: Yes      Overview: stable    Chronic rheumatic arthritis (CMS-[facility]) (Chronic) POA: Yes      Overview: - on home methotrexate, humira, prednisone            Alcohol abuse (Chronic) POA: Yes      Overview: - Maddrey score 11.3        - quit alcohol since her father passed away on 3/2017        - US abdomen shows distended gallbladder without evidence of biliary duct       dilation or gallstone, fatty liver disease.      - AST/ALT: 62/62    RENEE (acute kidney injury) (CMS-[facility]) POA: Yes      Overview: On admission Cr: 4.1      U/S renal with no abnormalities    Low vitamin D level (Chronic) POA: Yes      Overview: Vit D is 8 on 6/1/17      DEXA scan: osteopenia.     Hyperlipidemia (Chronic) POA: Yes    HTN (hypertension) (Chronic) POA: Yes      Overview: On admission: 180/100  Resolved Problems:    * No resolved hospital problems. *      Review of Systems   Constitutional: Positive for malaise/fatigue. Negative for fever, chills and diaphoresis.   HENT: Negative for tinnitus.    Eyes: Negative for blurred vision and double vision.   Respiratory: Negative for hemoptysis and sputum production.    Cardiovascular: Negative for chest pain and palpitations.   Gastrointestinal: Positive for nausea, abdominal pain, diarrhea, blood in stool and melena. Negative for heartburn, vomiting and constipation.   Genitourinary: Negative for dysuria, urgency and frequency.   Musculoskeletal: Negative for myalgias and neck pain.   Skin: Negative for rash.   Neurological: Positive for weakness. Negative for dizziness, tingling and headaches.   Psychiatric/Behavioral: Negative for depression, suicidal ideas and substance abuse.       Consultants/Specialty  none    Disposition  Home     Quality Measures  EKG reviewed, Labs reviewed, Radiology images  reviewed and Medications reviewed  Kovacs catheter: No Kovacs      DVT: GI bleeding on SCDs.                  Physical Exam       Filed Vitals:    06/02/17 0000 06/02/17 0400 06/02/17 0830 06/02/17 1217   BP: 152/88 124/61 157/88 148/92   Pulse: 91 70 91 86   Temp: 36.4 °C (97.5 °F) 36.1 °C (97 °F) 36.4 °C (97.5 °F) 36.2 °C (97.2 °F)   Resp: 18 18 16 16   Height:       Weight:       SpO2: 98% 94% 97% 97%     Body mass index is 33.48 kg/(m^2). Weight: 85.7 kg (188 lb 15 oz)  Oxygen Therapy:  Pulse Oximetry: 97 %, O2 (LPM): 0, O2 Delivery: None (Room Air)    Physical Exam   Constitutional: She is oriented to person, place, and time and well-developed, well-nourished, and in no distress. No distress.   Neck: No JVD present. No tracheal deviation present. No thyromegaly present.   Cardiovascular: Normal rate.    No murmur heard.  Pulmonary/Chest: No respiratory distress. She has no wheezes. She has no rales.   Abdominal: She exhibits no distension. There is tenderness. There is no rebound and no guarding.   Musculoskeletal: She exhibits no edema.   Neurological: She is alert and oriented to person, place, and time. No cranial nerve deficit.   Skin: Skin is warm. No rash noted. No erythema.         Lab Data Review:      6/1/2017  5:17 PM    Recent Labs      05/31/17   0532   06/01/17   0554  06/01/17   0711  06/02/17   0325   SODIUM  139   < >  138  140  139   POTASSIUM  2.4*   < >  2.7*  2.8*  2.8*   CHLORIDE  110   < >  108  108  106   CO2  19*   < >  21  23  25   BUN  15   < >  7*  8  6*   CREATININE  0.88   < >  0.67  0.61  0.43*   MAGNESIUM  1.0*   --    --   1.3*  1.5   PHOSPHORUS  1.7*   --    --    --   1.7*   CALCIUM  6.7*   < >  6.7*  6.6*  7.0*    < > = values in this interval not displayed.       Recent Labs      05/31/17   0532   06/01/17   0316  06/01/17   0554  06/01/17   0711  06/02/17   0325  06/02/17   0919   ALTSGPT  70*   --   62*   --    --   46   --    ASTSGOT  104*   --   62*   --    --   37   --   "  ALKPHOSPHAT  245*   --   233*   --    --   193*   --    TBILIRUBIN  0.6   --   0.5   --    --   0.5   --    GAMMAGT   --    --    --    --    --    --   881*   GLUCOSE  110*   < >  91  95  98  90   --     < > = values in this interval not displayed.       Recent Labs      05/31/17   0532  06/01/17 0316 06/02/17   0325   RBC  3.11*  3.15*  2.90*   HEMOGLOBIN  9.0*  9.1*  8.3*   HEMATOCRIT  28.5*  28.0*  25.7*   PLATELETCT  115*  106*  107*       Recent Labs      05/31/17   0532  06/01/17 0316 06/02/17   0325   WBC  5.1  6.6  5.9   NEUTSPOLYS  51.90  58.90  50.80   LYMPHOCYTES  37.80  30.00  35.90   MONOCYTES  8.50  7.60  9.80   EOSINOPHILS  0.80  2.10  1.50   BASOPHILS  0.40  0.30  0.50   ASTSGOT  104*  62*  37   ALTSGPT  70*  62*  46   ALKPHOSPHAT  245*  233*  193*   TBILIRUBIN  0.6  0.5  0.5           Assessment/Plan     * Septic shock (CMS-HCC) (present on admission)  Overview  - WBC: 16 on admission Lactic acid: 6  - needed norepinephrine for low blood pressure in the ICU.   - Blood culture negative.   - Cdiff PCR+, toxin AB NGT ;    - CT abd/pelvis at outside facility without contrast negative for intraabdominal pathology    - USG shows \"distended gall bladder, No evidence of gallstone or biliary ductal dilatation, Enlarged echogenic liver consistent with fatty change versus hepatocellular dysfunction\".    - Cortizone 10 on admission.     Assessment & Plan  Was given vanco PO for  3 5/17>>5/29 days and Zosyn  for 5 days 5/27>>5/31  Resolved.   No surgical intervention planned from Surgery ( Dr. Ibrahim), recommended conservative management.  She still has diarrhea 3-4 loose stool black.   Likely infection intra abdomen, adrenal failure(she was on prednisone and ran it out before admission.  Stable now no fever and no hypotension.   HIDA: pending.   Monitor H&H  Continue on prednisone  No abx at this time and resume zosyn or vanco PO if needed.     Diarrhea (present on admission)  Overview  Cdiff PCR+, " toxin AB NGT.  No WBC in stool.     Assessment & Plan  Likely related to GI bleeding  HIDA to reassess the gall bladder.   IV fluid and monitor H&H  Resume Vanco PO if needed.     Abdominal pain (present on admission)  Overview  - US abdomen shows distended gallbladder without evidence of biliary duct dilation or gallstone, fatty liver disease  - AST: 46>>>61  - ALT: 36>>62  - Al phosphatase: 179>>>233  - GGT: 1059    Assessment & Plan  Gets better but still generalized pain  HIDA to reassess the gall bladder.     Electrolyte depletion (present on admission)  Overview  K: 2.7 and M.6 and Ca: 8 corrected.     Assessment & Plan  replacement  Follow up.     GI bleeding (present on admission)  Overview  Hb: 11 on admission >>>9   MCV: 88   FOBT: Positive.   EGD/Colonoscopy on 17: ulcerative esophagitis, gastritis, duodenitis, cecal AVMs x three, hemorrhoids.     Assessment & Plan  Monitor H&H and blood transfusion if Hb less than 7  Omeprazole 20 mg BID and sucralfate.   EGD in 12 weeks  Colonoscopy in 7-10 years.   Follow up pathology.     SLE (systemic lupus erythematosus) (CMS-HCC) (present on admission)  Overview  stable    Assessment & Plan  - on  prednisone 15 mg daily,   - continue to hold methotrexate and humira in the setting of infection     Chronic rheumatic arthritis (CMS-HCC) (present on admission)  Overview  - on home methotrexate, humira, prednisone        Assessment & Plan  - on  prednisone 15 mg daily,   - continue to hold methotrexate and humira at the hospital.     Alcohol abuse (present on admission)  Overview  - Maddrey score 11.3    - quit alcohol since her father passed away on 3/2017    - US abdomen shows distended gallbladder without evidence of biliary duct dilation or gallstone, fatty liver disease.  - AST/ALT: 62/62    Assessment & Plan  Encouraged the patient to stay away from alcohol.     Hyperlipidemia (present on admission)  Assessment & Plan  - held home gemfibrozil, pravastatin,  restart once LFT stable    HTN (hypertension) (present on admission)  Overview  On admission: 180/100    Assessment & Plan  Home med: lisinopril >>was held due to RENEE  On amlodipine 5 mg daily  Follow up and resume lisinopril if needed.     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  nephro was on board >>did not need dialysis.   Cr: 0.8  On prednisone and fluid.       Low vitamin D level (present on admission)  Overview  Vit D is 8 on 6/1/17  DEXA scan: osteopenia.     Assessment & Plan  D2 50,000 weekly.   Ca gluconate.

## 2017-06-02 NOTE — CARE PLAN
Problem: Nutritional:  Goal: Achieve adequate nutritional intake  Patient will consume 50% of meals  Outcome: NOT MET         no diarrhea/no fever/no vomiting

## 2017-06-02 NOTE — DIETARY
Nutrition Services - Brief Update - Admit day 6     Pt on clears at this time r/t upcoming gallbladder scan.  Spoke with nursing today r/t Pt essentially on clear liquids/NPO for the majority of admission   Nursing to follow up w/ MD following procedure fr/t solid foods     Weight: 85.7Kg per bed scale, up from 79.4Kg on admission  Body mass index is 33.48 kg/(m^2).   No edema noted per chart     Pertinent Labs: K+ 2.8, BUN 6, Cr 0.43, corrected calcium = 8.04 (7.0 skewed r/t low albumin inflammatory marker), phosphorous 1.7  Pertinent Medication: norvasc, OS-KEERTHI, Mag-Ox, prilosec, K-Dur, prednisone, carafate, Vitamin D  Fluids: KCl in NS at 83mL/hr     GI: last BM today. Pt c/o diarrhea 6-7x per day  Need for soluble fiber     Plan/Recommendation     Replace phosphorous     Recommend PO diet as soon as medically feasible, if not, consider nutrition support if Pt continues to be NPO/clear liquids for 48-72 hours.     RD following

## 2017-06-02 NOTE — PROGRESS NOTES
Received report from RN. Assumed pt care. Assessment completed. A&Ox4. Pain 5/10 in abdomen. O2 97% on RA.   Pt is currently in bed. Denies SOB, chest pain, dizziness.   Pt upself, pt calls appropriately and does not get out of bed. Bed in lowest position, bed locked, RN and CNA numbers provided, no further needs at this time. Safety precautions in place. Hourly rounding in progress. Call light within reach

## 2017-06-02 NOTE — ASSESSMENT & PLAN NOTE
Home med: lisinopril >>was held due to RENEE  On amlodipine 5 mg daily  Follow up and resume lisinopril if needed.

## 2017-06-02 NOTE — PROGRESS NOTES
Handoff report received. Assume patient care. Patient sitting at edge of bed. Pt took incoming call. Patient not in pain. Patient not in distress, AAOX 4. Safety precautions in place. Call light and personal belongings within reach. Educated to call for assistance if needed.

## 2017-06-02 NOTE — PROGRESS NOTES
"                               [facility] [facility] [place]    Name Brandi Leggett     1965   Age/Sex 51 y.o. female   MRN [id number]   Code Status Full     After 5PM or if no immediate response to page, please call for cross-coverage  Attending/Team: Dr Ferguson Call [phone] to page   1st Call - Day Intern (R1):   Dr Perez 2nd Call - Day Sr. Resident (R2/R3):   Dr Herrera         Chief complaint/ reason for interval visit (Primary Diagnosis)   Abdominal pain/N/V/septic shock.     Interval Problem Daily Status Update:  In ICU from >>  Floor tele :    The patient feels better today but still has diarrhea and black stool.  HIDA was ordered.       Principal Problem:    Septic shock (CMS-HCC) POA: Yes      Overview: - WBC: 16 on admission Lactic acid: 6      - needed norepinephrine for low blood pressure in the ICU.       - Blood culture negative.       - Cdiff PCR+, toxin AB NGT ;        - CT abd/pelvis at outside facility without contrast negative for       intraabdominal pathology        - USG shows \"distended gall bladder, No evidence of gallstone or biliary       ductal dilatation, Enlarged echogenic liver consistent with fatty change       versus hepatocellular dysfunction\".        - Cortizone 10 on admission.   Active Problems:    Diarrhea POA: Yes      Overview: - Cdiff PCR+, toxin AB NGT ;      Abdominal pain POA: Yes      Overview: - US abdomen shows distended gallbladder without evidence of biliary duct       dilation or gallstone, fatty liver disease      - AST: 46>>>61      - ALT: 36>>62      - Al phosphatase: 179>>>233      - GGT: 1059    Electrolyte depletion POA: Yes      Overview: K: 2.7 and M.6 and Ca: 8 corrected.     GI bleeding POA: Yes      Overview: Hb: 11 on admission >>>9       MCV: 88       FOBT: Positive.       EGD/Colonoscopy on 17: ulcerative esophagitis, gastritis, duodenitis,       cecal AVMs x three, hemorrhoids.     SLE (systemic lupus " erythematosus) (CMS-HCC) (Chronic) POA: Yes      Overview: stable    Chronic rheumatic arthritis (CMS-HCC) (Chronic) POA: Yes      Overview: - on home methotrexate, humira, prednisone            Alcohol abuse (Chronic) POA: Yes      Overview: - Maddrey score 11.3        - quit alcohol since her father passed away on 3/2017        - US abdomen shows distended gallbladder without evidence of biliary duct       dilation or gallstone, fatty liver disease.      - AST/ALT: 62/62    RENEE (acute kidney injury) (CMS-HCC) POA: Yes      Overview: On admission Cr: 4.1      U/S renal with no abnormalities    Low vitamin D level (Chronic) POA: Yes      Overview: Vit D is 8 on 6/1/17      DEXA scan: osteopenia.     Hyperlipidemia (Chronic) POA: Yes    HTN (hypertension) (Chronic) POA: Yes      Overview: On admission: 180/100  Resolved Problems:    * No resolved hospital problems. *      Review of Systems   Constitutional: Positive for malaise/fatigue. Negative for fever, chills and diaphoresis.   HENT: Negative for tinnitus.    Eyes: Negative for blurred vision and double vision.   Respiratory: Negative for hemoptysis and sputum production.    Cardiovascular: Negative for chest pain and palpitations.   Gastrointestinal: Positive for nausea, abdominal pain, diarrhea, blood in stool and melena. Negative for heartburn, vomiting and constipation.   Genitourinary: Negative for dysuria, urgency and frequency.   Musculoskeletal: Negative for myalgias and neck pain.   Skin: Negative for rash.   Neurological: Positive for weakness. Negative for dizziness, tingling and headaches.   Psychiatric/Behavioral: Negative for depression, suicidal ideas and substance abuse.       Consultants/Specialty  none    Disposition  Home     Quality Measures  EKG reviewed, Labs reviewed, Radiology images reviewed and Medications reviewed  Kovacs catheter: No Kovacs      DVT: GI bleeding on SCDs.                  Physical Exam       Filed Vitals:    06/01/17 0428  06/01/17 0813 06/01/17 1203 06/01/17 1648   BP: 130/76 169/95 150/93 160/90   Pulse: 76 81 92 82   Temp: 36.7 °C (98 °F) 36.5 °C (97.7 °F) 36.3 °C (97.3 °F) 36.3 °C (97.4 °F)   Resp: 20 19 19 19   Height:       Weight:       SpO2: 95% 98% 95% 98%     Body mass index is 33.36 kg/(m^2). Weight: 85.4 kg (188 lb 4.4 oz)  Oxygen Therapy:  Pulse Oximetry: 98 %, O2 (LPM): 0, O2 Delivery: None (Room Air)    Physical Exam   Constitutional: She is oriented to person, place, and time and well-developed, well-nourished, and in no distress. No distress.   Neck: No JVD present. No tracheal deviation present. No thyromegaly present.   Cardiovascular: Normal rate.    No murmur heard.  Pulmonary/Chest: No respiratory distress. She has no wheezes. She has no rales.   Abdominal: She exhibits no distension. There is tenderness. There is no rebound and no guarding.   Musculoskeletal: She exhibits no edema.   Neurological: She is alert and oriented to person, place, and time. No cranial nerve deficit.   Skin: Skin is warm. No rash noted. No erythema.         Lab Data Review:      6/1/2017  5:17 PM    Recent Labs      05/31/17   0532   06/01/17   0316  06/01/17   0554  06/01/17   0711   SODIUM  139   < >  138  138  140   POTASSIUM  2.4*   < >  2.9*  2.7*  2.8*   CHLORIDE  110   < >  107  108  108   CO2  19*   < >  21  21  23   BUN  15   < >  8  7*  8   CREATININE  0.88   < >  0.70  0.67  0.61   MAGNESIUM  1.0*   --    --    --   1.3*   PHOSPHORUS  1.7*   --    --    --    --    CALCIUM  6.7*   < >  6.7*  6.7*  6.6*    < > = values in this interval not displayed.       Recent Labs      05/30/17   0555  05/31/17   0532   06/01/17   0316  06/01/17   0554  06/01/17   0711   ALTSGPT  61*  70*   --   62*   --    --    ASTSGOT  97*  104*   --   62*   --    --    ALKPHOSPHAT  262*  245*   --   233*   --    --    TBILIRUBIN  0.6  0.6   --   0.5   --    --    GAMMAGT  1059*   --    --    --    --    --    GLUCOSE  168*  110*   < >  91  95  98    <  "> = values in this interval not displayed.       Recent Labs      05/30/17   0555  05/31/17   0532  06/01/17   0316   RBC  3.81*  3.11*  3.15*   HEMOGLOBIN  11.2*  9.0*  9.1*   HEMATOCRIT  34.1*  28.5*  28.0*   PLATELETCT  127*  115*  106*   IRON  103   --    --    FERRITIN  816.7*   --    --    TOTIRONBC  234*   --    --        Recent Labs      05/30/17   0555  05/31/17   0532  06/01/17   0316   WBC  2.7*  5.1  6.6   NEUTSPOLYS  68.60  51.90  58.90   LYMPHOCYTES  25.70  37.80  30.00   MONOCYTES  4.90  8.50  7.60   EOSINOPHILS  0.00  0.80  2.10   BASOPHILS  0.40  0.40  0.30   ASTSGOT  97*  104*  62*   ALTSGPT  61*  70*  62*   ALKPHOSPHAT  262*  245*  233*   TBILIRUBIN  0.6  0.6  0.5           Assessment/Plan     * Septic shock (CMS-HCC) (present on admission)  Overview  - WBC: 16 on admission Lactic acid: 6  - needed norepinephrine for low blood pressure in the ICU.   - Blood culture negative.   - Cdiff PCR+, toxin AB NGT ;    - CT abd/pelvis at outside facility without contrast negative for intraabdominal pathology    - USG shows \"distended gall bladder, No evidence of gallstone or biliary ductal dilatation, Enlarged echogenic liver consistent with fatty change versus hepatocellular dysfunction\".    - Cortizone 10 on admission.     Assessment & Plan  Was given vanco PO for  3 5/17>>5/29 days and Zosyn  for 5 days 5/27>>5/31  No surgical intervention planned from Surgery ( Dr. Ibrahim), recommended conservative management.  She still has diarrhea 3-4 loose stool black.   Likely infection intra abdomen, adrenal failure(she was on prednisone and ran it out before admission.  Stable now no fever and no hypotension.   HIDA to reassess the gall bladder.  Monitor H&H  Continue on prednisone  No abx at this time and resume zosyn or vanco PO if needed.     Diarrhea (present on admission)  Overview  - Cdiff PCR+, toxin AB NGT ;      Assessment & Plan  Likely related to GI bleeding  HIDA to reassess the gall bladder.   IV fluid " and monitor H&H  Resume Vanco PO if needed.     Abdominal pain (present on admission)  Overview  - US abdomen shows distended gallbladder without evidence of biliary duct dilation or gallstone, fatty liver disease  - AST: 46>>>61  - ALT: 36>>62  - Al phosphatase: 179>>>233  - GGT: 1059    Assessment & Plan  Gets better but still generalized pain  HIDA to reassess the gall bladder.     Electrolyte depletion (present on admission)  Overview  K: 2.7 and M.6 and Ca: 8 corrected.     Assessment & Plan  replacement  Follow up.     GI bleeding (present on admission)  Overview  Hb: 11 on admission >>>9   MCV: 88   FOBT: Positive.   EGD/Colonoscopy on 17: ulcerative esophagitis, gastritis, duodenitis, cecal AVMs x three, hemorrhoids.     Assessment & Plan  Monitor H&H and blood transfusion if Hb less than 7  Omeprazole 20 mg BID and sucralfate.   EGD in 12 weeks  Colonoscopy in 7-10 years.   Follow up pathology.     SLE (systemic lupus erythematosus) (CMS-HCC) (present on admission)  Overview  stable    Assessment & Plan  - on  prednisone 15 mg daily,   - continue to hold methotrexate and humira in the setting of infection     Chronic rheumatic arthritis (CMS-HCC) (present on admission)  Overview  - on home methotrexate, humira, prednisone        Assessment & Plan  - on  prednisone 15 mg daily,   - continue to hold methotrexate and humira at the hospital.     Alcohol abuse (present on admission)  Overview  - Maddrey score 11.3    - quit alcohol since her father passed away on 3/2017    - US abdomen shows distended gallbladder without evidence of biliary duct dilation or gallstone, fatty liver disease.  - AST/ALT: 62/62    Assessment & Plan  Encouraged the patient to stay away from alcohol.     Hyperlipidemia (present on admission)  Assessment & Plan  - held home gemfibrozil, pravastatin, restart once LFT stable    HTN (hypertension) (present on admission)  Overview  On admission: 180/100    Assessment & Plan  Home  med: lisinopril >>was held due to RENEE  On amlodipine 5 mg daily  Follow up and resume lisinopril if needed.     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  nephro was on board >>did not need dialysis.   Cr: 0.8  On prednisone and fluid.       Low vitamin D level (present on admission)  Overview  Vit D is 8 on 6/1/17  DEXA scan: osteopenia.     Assessment & Plan  D2 50,000 weekly.   Ca gluconate.

## 2017-06-02 NOTE — ASSESSMENT & PLAN NOTE
Monitor H&H and blood transfusion if Hb less than 7  Omeprazole 20 mg BID and sucralfate.   EGD in 12 weeks  Colonoscopy in 7-10 years.   Follow up pathology.

## 2017-06-02 NOTE — CARE PLAN
Problem: Infection  Goal: Will remain free from infection  Intervention: Assess signs and symptoms of infection  Will monitor pt's vitals and labs to for signs of infection. No changes in status at this time.       Problem: Bowel/Gastric:  Goal: Normal bowel function is maintained or improved  Intervention: Collaborate with Interdisciplinary Team for optimal positioning for bowel evacuation  Pt experiencing frequent loose stools. Diet changed to regular as recommended by RD and physician and pt encouraged to eat solid foods. Pt appetite increased. Pt progressing as expected.

## 2017-06-03 LAB
ALBUMIN SERPL BCP-MCNC: 2.6 G/DL (ref 3.2–4.9)
ALBUMIN/GLOB SERPL: 1.2 G/DL
ALP SERPL-CCNC: 170 U/L (ref 30–99)
ALT SERPL-CCNC: 43 U/L (ref 2–50)
ANION GAP SERPL CALC-SCNC: 6 MMOL/L (ref 0–11.9)
AST SERPL-CCNC: 28 U/L (ref 12–45)
BASOPHILS # BLD AUTO: 0.2 % (ref 0–1.8)
BASOPHILS # BLD: 0.01 K/UL (ref 0–0.12)
BILIRUB SERPL-MCNC: 0.3 MG/DL (ref 0.1–1.5)
BUN SERPL-MCNC: 8 MG/DL (ref 8–22)
CALCIUM SERPL-MCNC: 7.7 MG/DL (ref 8.5–10.5)
CHLORIDE SERPL-SCNC: 108 MMOL/L (ref 96–112)
CO2 SERPL-SCNC: 25 MMOL/L (ref 20–33)
CREAT SERPL-MCNC: 0.68 MG/DL (ref 0.5–1.4)
EOSINOPHIL # BLD AUTO: 0.07 K/UL (ref 0–0.51)
EOSINOPHIL NFR BLD: 1.3 % (ref 0–6.9)
ERYTHROCYTE [DISTWIDTH] IN BLOOD BY AUTOMATED COUNT: 42.6 FL (ref 35.9–50)
ERYTHROCYTE [DISTWIDTH] IN BLOOD BY AUTOMATED COUNT: 43.3 FL (ref 35.9–50)
GFR SERPL CREATININE-BSD FRML MDRD: >60 ML/MIN/1.73 M 2
GLOBULIN SER CALC-MCNC: 2.1 G/DL (ref 1.9–3.5)
GLUCOSE SERPL-MCNC: 115 MG/DL (ref 65–99)
HCT VFR BLD AUTO: 24.2 % (ref 37–47)
HCT VFR BLD AUTO: 27 % (ref 37–47)
HGB BLD-MCNC: 7.7 G/DL (ref 12–16)
HGB BLD-MCNC: 8.6 G/DL (ref 12–16)
IMM GRANULOCYTES # BLD AUTO: 0.09 K/UL (ref 0–0.11)
IMM GRANULOCYTES NFR BLD AUTO: 1.6 % (ref 0–0.9)
LYMPHOCYTES # BLD AUTO: 1.79 K/UL (ref 1–4.8)
LYMPHOCYTES NFR BLD: 32.8 % (ref 22–41)
MAGNESIUM SERPL-MCNC: 1.3 MG/DL (ref 1.5–2.5)
MCH RBC QN AUTO: 28.9 PG (ref 27–33)
MCH RBC QN AUTO: 29.1 PG (ref 27–33)
MCHC RBC AUTO-ENTMCNC: 31.8 G/DL (ref 33.6–35)
MCHC RBC AUTO-ENTMCNC: 31.9 G/DL (ref 33.6–35)
MCV RBC AUTO: 91 FL (ref 81.4–97.8)
MCV RBC AUTO: 91.2 FL (ref 81.4–97.8)
MONOCYTES # BLD AUTO: 0.63 K/UL (ref 0–0.85)
MONOCYTES NFR BLD AUTO: 11.5 % (ref 0–13.4)
NEUTROPHILS # BLD AUTO: 2.87 K/UL (ref 2–7.15)
NEUTROPHILS NFR BLD: 52.6 % (ref 44–72)
NRBC # BLD AUTO: 0 K/UL
NRBC BLD AUTO-RTO: 0 /100 WBC
PHOSPHATE SERPL-MCNC: 1.7 MG/DL (ref 2.5–4.5)
PLATELET # BLD AUTO: 122 K/UL (ref 164–446)
PLATELET # BLD AUTO: 170 K/UL (ref 164–446)
PMV BLD AUTO: 10.4 FL (ref 9–12.9)
PMV BLD AUTO: 9.7 FL (ref 9–12.9)
POTASSIUM SERPL-SCNC: 3.5 MMOL/L (ref 3.6–5.5)
PROT SERPL-MCNC: 4.7 G/DL (ref 6–8.2)
RBC # BLD AUTO: 2.66 M/UL (ref 4.2–5.4)
RBC # BLD AUTO: 2.96 M/UL (ref 4.2–5.4)
SODIUM SERPL-SCNC: 139 MMOL/L (ref 135–145)
WBC # BLD AUTO: 5.5 K/UL (ref 4.8–10.8)
WBC # BLD AUTO: 6.3 K/UL (ref 4.8–10.8)

## 2017-06-03 PROCEDURE — 87899 AGENT NOS ASSAY W/OPTIC: CPT

## 2017-06-03 PROCEDURE — 87045 FECES CULTURE AEROBIC BACT: CPT

## 2017-06-03 PROCEDURE — 85025 COMPLETE CBC W/AUTO DIFF WBC: CPT

## 2017-06-03 PROCEDURE — 700102 HCHG RX REV CODE 250 W/ 637 OVERRIDE(OP): Performed by: COLON & RECTAL SURGERY

## 2017-06-03 PROCEDURE — 700102 HCHG RX REV CODE 250 W/ 637 OVERRIDE(OP): Performed by: INTERNAL MEDICINE

## 2017-06-03 PROCEDURE — 85027 COMPLETE CBC AUTOMATED: CPT

## 2017-06-03 PROCEDURE — 700111 HCHG RX REV CODE 636 W/ 250 OVERRIDE (IP): Performed by: STUDENT IN AN ORGANIZED HEALTH CARE EDUCATION/TRAINING PROGRAM

## 2017-06-03 PROCEDURE — 770020 HCHG ROOM/CARE - TELE (206)

## 2017-06-03 PROCEDURE — 83735 ASSAY OF MAGNESIUM: CPT

## 2017-06-03 PROCEDURE — 700102 HCHG RX REV CODE 250 W/ 637 OVERRIDE(OP): Performed by: STUDENT IN AN ORGANIZED HEALTH CARE EDUCATION/TRAINING PROGRAM

## 2017-06-03 PROCEDURE — 84100 ASSAY OF PHOSPHORUS: CPT

## 2017-06-03 PROCEDURE — A9270 NON-COVERED ITEM OR SERVICE: HCPCS | Performed by: STUDENT IN AN ORGANIZED HEALTH CARE EDUCATION/TRAINING PROGRAM

## 2017-06-03 PROCEDURE — 82784 ASSAY IGA/IGD/IGG/IGM EACH: CPT

## 2017-06-03 PROCEDURE — 700102 HCHG RX REV CODE 250 W/ 637 OVERRIDE(OP): Performed by: HOSPITALIST

## 2017-06-03 PROCEDURE — 87046 STOOL CULTR AEROBIC BACT EA: CPT

## 2017-06-03 PROCEDURE — A9270 NON-COVERED ITEM OR SERVICE: HCPCS | Performed by: INTERNAL MEDICINE

## 2017-06-03 PROCEDURE — A9270 NON-COVERED ITEM OR SERVICE: HCPCS | Performed by: HOSPITALIST

## 2017-06-03 PROCEDURE — 700105 HCHG RX REV CODE 258: Performed by: INTERNAL MEDICINE

## 2017-06-03 PROCEDURE — 80053 COMPREHEN METABOLIC PANEL: CPT

## 2017-06-03 PROCEDURE — 99231 SBSQ HOSP IP/OBS SF/LOW 25: CPT | Mod: GC | Performed by: HOSPITALIST

## 2017-06-03 PROCEDURE — 700111 HCHG RX REV CODE 636 W/ 250 OVERRIDE (IP): Performed by: INTERNAL MEDICINE

## 2017-06-03 PROCEDURE — A9270 NON-COVERED ITEM OR SERVICE: HCPCS | Performed by: COLON & RECTAL SURGERY

## 2017-06-03 PROCEDURE — 83516 IMMUNOASSAY NONANTIBODY: CPT

## 2017-06-03 PROCEDURE — 36415 COLL VENOUS BLD VENIPUNCTURE: CPT

## 2017-06-03 RX ORDER — CALCIUM POLYCARBOPHIL 625 MG 625 MG/1
625 TABLET ORAL
Status: DISCONTINUED | OUTPATIENT
Start: 2017-06-03 | End: 2017-06-05 | Stop reason: HOSPADM

## 2017-06-03 RX ORDER — MAGNESIUM SULFATE HEPTAHYDRATE 40 MG/ML
4 INJECTION, SOLUTION INTRAVENOUS ONCE
Status: COMPLETED | OUTPATIENT
Start: 2017-06-03 | End: 2017-06-03

## 2017-06-03 RX ORDER — CHOLESTYRAMINE LIGHT 4 G/5.7G
1 POWDER, FOR SUSPENSION ORAL 2 TIMES DAILY
Status: DISCONTINUED | OUTPATIENT
Start: 2017-06-03 | End: 2017-06-05 | Stop reason: HOSPADM

## 2017-06-03 RX ADMIN — OMEPRAZOLE 20 MG: 20 CAPSULE, DELAYED RELEASE ORAL at 09:07

## 2017-06-03 RX ADMIN — SUCRALFATE 1 G: 1 SUSPENSION ORAL at 11:36

## 2017-06-03 RX ADMIN — POTASSIUM CHLORIDE: 2 INJECTION, SOLUTION, CONCENTRATE INTRAVENOUS at 04:42

## 2017-06-03 RX ADMIN — CHOLESTYRAMINE 4 G: 4 POWDER, FOR SUSPENSION ORAL at 21:18

## 2017-06-03 RX ADMIN — CALCIUM POLYCARBOPHIL 625 MG: 625 TABLET, FILM COATED ORAL at 18:07

## 2017-06-03 RX ADMIN — SUCRALFATE 1 G: 1 SUSPENSION ORAL at 23:28

## 2017-06-03 RX ADMIN — POTASSIUM CHLORIDE 40 MEQ: 1500 TABLET, EXTENDED RELEASE ORAL at 21:17

## 2017-06-03 RX ADMIN — OMEPRAZOLE 20 MG: 20 CAPSULE, DELAYED RELEASE ORAL at 21:18

## 2017-06-03 RX ADMIN — DIBASIC SODIUM PHOSPHATE, MONOBASIC POTASSIUM PHOSPHATE AND MONOBASIC SODIUM PHOSPHATE 1 TABLET: 852; 155; 130 TABLET ORAL at 18:07

## 2017-06-03 RX ADMIN — Medication 500 MG: at 09:07

## 2017-06-03 RX ADMIN — Medication 500 MG: at 18:07

## 2017-06-03 RX ADMIN — POTASSIUM CHLORIDE 40 MEQ: 1500 TABLET, EXTENDED RELEASE ORAL at 09:08

## 2017-06-03 RX ADMIN — POTASSIUM CHLORIDE: 2 INJECTION, SOLUTION, CONCENTRATE INTRAVENOUS at 21:18

## 2017-06-03 RX ADMIN — DIBASIC SODIUM PHOSPHATE, MONOBASIC POTASSIUM PHOSPHATE AND MONOBASIC SODIUM PHOSPHATE 1 TABLET: 852; 155; 130 TABLET ORAL at 23:28

## 2017-06-03 RX ADMIN — SUCRALFATE 1 G: 1 SUSPENSION ORAL at 04:44

## 2017-06-03 RX ADMIN — DIBASIC SODIUM PHOSPHATE, MONOBASIC POTASSIUM PHOSPHATE AND MONOBASIC SODIUM PHOSPHATE 1 TABLET: 852; 155; 130 TABLET ORAL at 11:36

## 2017-06-03 RX ADMIN — MAGNESIUM GLUCONATE 500 MG ORAL TABLET 400 MG: 500 TABLET ORAL at 09:07

## 2017-06-03 RX ADMIN — DIBASIC SODIUM PHOSPHATE, MONOBASIC POTASSIUM PHOSPHATE AND MONOBASIC SODIUM PHOSPHATE 1 TABLET: 852; 155; 130 TABLET ORAL at 04:44

## 2017-06-03 RX ADMIN — CHOLESTYRAMINE 4 G: 4 POWDER, FOR SUSPENSION ORAL at 14:20

## 2017-06-03 RX ADMIN — AMLODIPINE BESYLATE 5 MG: 5 TABLET ORAL at 09:07

## 2017-06-03 RX ADMIN — SUCRALFATE 1 G: 1 SUSPENSION ORAL at 18:07

## 2017-06-03 RX ADMIN — CALCIUM POLYCARBOPHIL 625 MG: 625 TABLET, FILM COATED ORAL at 11:36

## 2017-06-03 RX ADMIN — MAGNESIUM SULFATE IN WATER 4 G: 40 INJECTION, SOLUTION INTRAVENOUS at 09:08

## 2017-06-03 RX ADMIN — PREDNISONE 15 MG: 5 TABLET ORAL at 09:07

## 2017-06-03 ASSESSMENT — ENCOUNTER SYMPTOMS
HEARTBURN: 0
DIAPHORESIS: 0
SPUTUM PRODUCTION: 0
CHILLS: 0
DIZZINESS: 0
HEADACHES: 0
NAUSEA: 0
DIARRHEA: 1
VOMITING: 0
BLOOD IN STOOL: 0
DOUBLE VISION: 0
TINGLING: 0
FEVER: 0
CONSTIPATION: 0
ABDOMINAL PAIN: 0
HEMOPTYSIS: 0
WEAKNESS: 1
BLURRED VISION: 0
DEPRESSION: 0
MYALGIAS: 0
NECK PAIN: 0
PALPITATIONS: 0

## 2017-06-03 ASSESSMENT — PAIN SCALES - GENERAL
PAINLEVEL_OUTOF10: 3
PAINLEVEL_OUTOF10: 5
PAINLEVEL_OUTOF10: 0
PAINLEVEL_OUTOF10: 0
PAINLEVEL_OUTOF10: 5

## 2017-06-03 ASSESSMENT — LIFESTYLE VARIABLES: SUBSTANCE_ABUSE: 0

## 2017-06-03 NOTE — PROGRESS NOTES
Received report from RN. Assumed pt care. Assessment completed. A&Ox4. Pain 5/10, pt denies pharmacological interventions. O2 100% on RA.   Pt is currently in bed. Denies SOB, chest pain, dizziness.   Pt upself, pt calls appropriately. Bed in lowest position, bed locked, RN and CNA numbers provided, no further needs at this time. Safety precautions in place. Hourly rounding in progress.

## 2017-06-03 NOTE — PROGRESS NOTES
Received report. Assessed pt. Discussed plan of care. AOx4 Call light in reach. Fall precautions in place. Bed in lowest position, bed locked, RN and CNA numbers provided. Provided water and crackers. Hourly rounding in practice.

## 2017-06-03 NOTE — PROGRESS NOTES
"                               [facility] Internal Medicine Interval Note    Name Brandi Leggett     1965   Age/Sex 51 y.o. female   MRN [id number]   Code Status Full     After 5PM or if no immediate response to page, please call for cross-coverage  Attending/Team: Dr Ferguson Call [phone] to page   1st Call - Day Intern (R1):   Dr Perez 2nd Call - Day Sr. Resident (R2/R3):   Dr Herrera         Chief complaint/ reason for interval visit (Primary Diagnosis)   Abdominal pain/N/V/septic shock.     Interval Problem Daily Status Update:  In ICU from >> then floor tele .  Diarrhea get better>>3-4 BM/24hr  HIDA : normal>>Anti Smooth muscle AB pending.   Hb dropped from 9>8>>7.7>>follow up.   Still Mg and Po4 low.   Started with cholestyramine for diarrhea.       Principal Problem:    Septic shock (CMS-HCC) POA: Yes      Overview: - WBC: 16 on admission Lactic acid: 6      - needed norepinephrine for low blood pressure in the ICU.       - Blood culture negative.       - Cdiff PCR+, toxin AB NGT ;        - CT abd/pelvis at outside facility without contrast negative for       intraabdominal pathology        - USG shows \"distended gall bladder, No evidence of gallstone or biliary       ductal dilatation, Enlarged echogenic liver consistent with fatty change       versus hepatocellular dysfunction\".        - Cortizone 10 on admission.       - HIDA:  No evidence for acute cholecystitis or common bile duct       obstruction.  Active Problems:    Diarrhea POA: Yes      Overview: Cdiff PCR+, toxin AB NGT.      No WBC in stool.     Abdominal pain POA: Yes      Overview: - US abdomen shows distended gallbladder without evidence of biliary duct       dilation or gallstone, fatty liver disease      - AST: 46>>>61>28      - ALT: 36>>62>>43      - Al phosphatase: 179>>>233>>170      - GGT: 1059>>881      HIDA: normal.     Electrolyte depletion POA: Yes      Overview: K: 2.7 and M.6 and Ca: 8 corrected.     " GI bleeding POA: Yes      Overview: Hb: 11 on admission >>>9>>7.7      MCV: 88       FOBT: Positive.       EGD/Colonoscopy on 6/1/17: ulcerative esophagitis, gastritis, duodenitis,       cecal AVMs x three, hemorrhoids.     SLE (systemic lupus erythematosus) (CMS-HCC) (Chronic) POA: Yes      Overview: stable    Chronic rheumatic arthritis (CMS-HCC) (Chronic) POA: Yes      Overview: - on home methotrexate, humira, prednisone            Alcohol abuse (Chronic) POA: Yes      Overview: - Maddrey score 11.3        - quit alcohol since her father passed away on 3/2017        - US abdomen shows distended gallbladder without evidence of biliary duct       dilation or gallstone, fatty liver disease.      - AST/ALT: 62/62    RENEE (acute kidney injury) (CMS-[facility]) POA: Yes      Overview: On admission Cr: 4.1      U/S renal with no abnormalities    Low vitamin D level (Chronic) POA: Yes      Overview: Vit D is 8 on 6/1/17      DEXA scan: osteopenia.     Hyperlipidemia (Chronic) POA: Yes    HTN (hypertension) (Chronic) POA: Yes      Overview: On admission: 180/100  Resolved Problems:    * No resolved hospital problems. *      Review of Systems   Constitutional: Positive for malaise/fatigue. Negative for fever, chills and diaphoresis.   HENT: Negative for tinnitus.    Eyes: Negative for blurred vision and double vision.   Respiratory: Negative for hemoptysis and sputum production.    Cardiovascular: Negative for chest pain and palpitations.   Gastrointestinal: Positive for diarrhea. Negative for heartburn, nausea, vomiting, abdominal pain, constipation, blood in stool and melena.   Genitourinary: Negative for dysuria, urgency and frequency.   Musculoskeletal: Negative for myalgias and neck pain.   Skin: Negative for rash.   Neurological: Positive for weakness. Negative for dizziness, tingling and headaches.   Psychiatric/Behavioral: Negative for depression, suicidal ideas and substance abuse.        Consultants/Specialty  none    Disposition  Home     Quality Measures  EKG reviewed, Labs reviewed, Radiology images reviewed and Medications reviewed  Kovacs catheter: No Kovacs      DVT: GI bleeding on SCDs.  DVT prophylaxis - mechanical: SCDs                Physical Exam       Filed Vitals:    06/03/17 0000 06/03/17 0400 06/03/17 0757 06/03/17 1200   BP: 121/72 119/69 142/91 138/91   Pulse: 96 77 93 102   Temp: 36.2 °C (97.2 °F) 36.3 °C (97.4 °F) 36.3 °C (97.4 °F) 36.3 °C (97.3 °F)   Resp: 16 16 18 18   Height:       Weight: 80.6 kg (177 lb 11.1 oz)      SpO2: 96% 95% 100% 98%     Body mass index is 31.48 kg/(m^2). Weight: 80.6 kg (177 lb 11.1 oz)  Oxygen Therapy:  Pulse Oximetry: 98 %, O2 (LPM): 0, O2 Delivery: None (Room Air)    Physical Exam   Constitutional: She is oriented to person, place, and time and well-developed, well-nourished, and in no distress. No distress.   Neck: No JVD present. No tracheal deviation present. No thyromegaly present.   Cardiovascular: Normal rate.    No murmur heard.  Pulmonary/Chest: No respiratory distress. She has no wheezes. She has no rales.   Abdominal: She exhibits no distension. There is tenderness. There is no rebound and no guarding.   Musculoskeletal: She exhibits no edema.   Neurological: She is alert and oriented to person, place, and time. No cranial nerve deficit.   Skin: Skin is warm. No rash noted. No erythema.         Lab Data Review:      6/1/2017  5:17 PM    Recent Labs      06/01/17   0711  06/02/17   0325  06/03/17   0254   SODIUM  140  139  139   POTASSIUM  2.8*  2.8*  3.5*   CHLORIDE  108  106  108   CO2  23  25  25   BUN  8  6*  8   CREATININE  0.61  0.43*  0.68   MAGNESIUM  1.3*  1.5  1.3*   PHOSPHORUS   --   1.7*  1.7*   CALCIUM  6.6*  7.0*  7.7*       Recent Labs      06/01/17   0316   06/01/17   0711  06/02/17   0325  06/02/17   0919  06/03/17   0254   ALTSGPT  62*   --    --   46   --   43   ASTSGOT  62*   --    --   37   --   28   ALKPHOSPHAT   "233*   --    --   193*   --   170*   TBILIRUBIN  0.5   --    --   0.5   --   0.3   GAMMAGT   --    --    --    --   881*   --    GLUCOSE  91   < >  98  90   --   115*    < > = values in this interval not displayed.       Recent Labs      06/01/17 0316 06/02/17 0325 06/03/17   0254   RBC  3.15*  2.90*  2.66*   HEMOGLOBIN  9.1*  8.3*  7.7*   HEMATOCRIT  28.0*  25.7*  24.2*   PLATELETCT  106*  107*  122*       Recent Labs      06/01/17 0316 06/02/17 0325 06/03/17   0254   WBC  6.6  5.9  5.5   NEUTSPOLYS  58.90  50.80  52.60   LYMPHOCYTES  30.00  35.90  32.80   MONOCYTES  7.60  9.80  11.50   EOSINOPHILS  2.10  1.50  1.30   BASOPHILS  0.30  0.50  0.20   ASTSGOT  62*  37  28   ALTSGPT  62*  46  43   ALKPHOSPHAT  233*  193*  170*   TBILIRUBIN  0.5  0.5  0.3           Assessment/Plan     * Septic shock (CMS-HCC) (present on admission)  Overview  - WBC: 16 on admission Lactic acid: 6  - needed norepinephrine for low blood pressure in the ICU.   - Blood culture negative.   - Cdiff PCR+, toxin AB NGT ;    - CT abd/pelvis at outside facility without contrast negative for intraabdominal pathology    - USG shows \"distended gall bladder, No evidence of gallstone or biliary ductal dilatation, Enlarged echogenic liver consistent with fatty change versus hepatocellular dysfunction\".    - Cortizone 10 on admission.   - HIDA:  No evidence for acute cholecystitis or common bile duct obstruction.    Assessment & Plan  Was given vanco PO for  3 5/17>>5/29 days and Zosyn  for 5 days 5/27>>5/31  No source of infection.   Resolved.   No surgical intervention planned from Surgery ( Dr. Ibrahim), recommended conservative management.  She still has diarrhea 3-4 loose stool black.   Likely infection intra abdomen, adrenal failure(she was on prednisone and ran it out before admission.  Stable now no fever and no hypotension.    Monitor H&H  Continue on prednisone  No abx at this time and resume zosyn or vanco PO if needed.     Diarrhea " (present on admission)  Overview  Cdiff PCR+, toxin AB NGT.  No WBC in stool.     Assessment & Plan  Likely related to GI bleeding  Started cholystramine on 6/3/17  IV fluid and monitor H&H  Resume Vanco PO if needed.     Abdominal pain (present on admission)  Overview  - US abdomen shows distended gallbladder without evidence of biliary duct dilation or gallstone, fatty liver disease  - AST: 46>>>61>28  - ALT: 36>>62>>43  - Al phosphatase: 179>>>233>>170  - GGT: 1059>>881  HIDA: normal.     Assessment & Plan  Gets better but still generalized pain      Electrolyte depletion (present on admission)  Overview  K: 2.7 and M.6 and Ca: 8 corrected.     Assessment & Plan  replacement  Follow up.     GI bleeding (present on admission)  Overview  Hb: 11 on admission >>>9>>7.7  MCV: 88   FOBT: Positive.   EGD/Colonoscopy on 17: ulcerative esophagitis, gastritis, duodenitis, cecal AVMs x three, hemorrhoids.     Assessment & Plan  Monitor H&H and blood transfusion if Hb less than 7  Omeprazole 20 mg BID and sucralfate.   EGD in 12 weeks  Colonoscopy in 7-10 years.   Follow up pathology.     SLE (systemic lupus erythematosus) (CMS-HCC) (present on admission)  Overview  stable    Assessment & Plan  - on  prednisone 15 mg daily,   - continue to hold methotrexate and humira in the setting of infection     Chronic rheumatic arthritis (CMS-HCC) (present on admission)  Overview  - on home methotrexate, humira, prednisone        Assessment & Plan  - on  prednisone 15 mg daily,   - continue to hold methotrexate and humira at the hospital.     Alcohol abuse (present on admission)  Overview  - Maddrey score 11.3    - quit alcohol since her father passed away on 3/2017    - US abdomen shows distended gallbladder without evidence of biliary duct dilation or gallstone, fatty liver disease.  - AST/ALT: 62/62    Assessment & Plan  Encouraged the patient to stay away from alcohol.     Hyperlipidemia (present on admission)  Assessment &  Plan  - held home gemfibrozil, pravastatin, restart once LFT stable    HTN (hypertension) (present on admission)  Overview  On admission: 180/100    Assessment & Plan  Home med: lisinopril >>was held due to RENEE  On amlodipine 5 mg daily  Follow up and resume lisinopril if needed.     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  nephro was on board >>did not need dialysis.   Cr: 0.8>>0.6  On prednisone and fluid.       Low vitamin D level (present on admission)  Overview  Vit D is 8 on 6/1/17  DEXA scan: osteopenia.     Assessment & Plan  D2 50,000 weekly.   Ca gluconate.

## 2017-06-03 NOTE — CARE PLAN
Problem: Communication  Goal: The ability to communicate needs accurately and effectively will improve  Intervention: Educate patient and significant other/support system about the plan of care, procedures, treatments, medications and allow for questions  Discuss plan of care. Address concerns and questions.       Problem: Safety  Goal: Will remain free from injury  Intervention: Provide assistance with mobility  Discuss safety precautions and educate pt to call for assistance.

## 2017-06-03 NOTE — PROGRESS NOTES
Pt returned from [facility]. No complaints, no concerns at this time. Food tray ordered. Vitals taken by JUAN MANUEL Ojeda.

## 2017-06-03 NOTE — CARE PLAN
Problem: Venous Thromboembolism (VTW)/Deep Vein Thrombosis (DVT) Prevention:  Goal: Patient will participate in Venous Thrombosis (VTE)/Deep Vein Thrombosis (DVT)Prevention Measures    06/03/17 0800   OTHER   Risk Assessment Score 2   VTE RISK Moderate   Mechanical Prophylaxis SCDs, Sequentials (Intermittent Pneumatic Compression Devices)     Pt on SCD's. Pt tolerates well. Pt does not qualify for pharmacological treatment due to recent GI bleed.     Problem: Bowel/Gastric:  Goal: Normal bowel function is maintained or improved  Outcome: PROGRESSING SLOWER THAN EXPECTED  Pt still experiencing frequent BM and loose dark green stool. Regular diet initiated, supplemental fiber added to diet.

## 2017-06-04 LAB
ALBUMIN SERPL BCP-MCNC: 2.8 G/DL (ref 3.2–4.9)
ALBUMIN/GLOB SERPL: 1.2 G/DL
ALP SERPL-CCNC: 178 U/L (ref 30–99)
ALT SERPL-CCNC: 46 U/L (ref 2–50)
ANION GAP SERPL CALC-SCNC: 7 MMOL/L (ref 0–11.9)
ANISOCYTOSIS BLD QL SMEAR: ABNORMAL
AST SERPL-CCNC: 31 U/L (ref 12–45)
BASOPHILS # BLD AUTO: 0.9 % (ref 0–1.8)
BASOPHILS # BLD: 0.05 K/UL (ref 0–0.12)
BILIRUB SERPL-MCNC: 0.3 MG/DL (ref 0.1–1.5)
BUN SERPL-MCNC: 7 MG/DL (ref 8–22)
CALCIUM SERPL-MCNC: 8.4 MG/DL (ref 8.5–10.5)
CHLORIDE SERPL-SCNC: 111 MMOL/L (ref 96–112)
CO2 SERPL-SCNC: 22 MMOL/L (ref 20–33)
CREAT SERPL-MCNC: 0.6 MG/DL (ref 0.5–1.4)
E COLI SXT1+2 STL IA: NORMAL
EOSINOPHIL # BLD AUTO: 0.11 K/UL (ref 0–0.51)
EOSINOPHIL NFR BLD: 1.8 % (ref 0–6.9)
ERYTHROCYTE [DISTWIDTH] IN BLOOD BY AUTOMATED COUNT: 44.3 FL (ref 35.9–50)
ERYTHROCYTE [DISTWIDTH] IN BLOOD BY AUTOMATED COUNT: 45.1 FL (ref 35.9–50)
GFR SERPL CREATININE-BSD FRML MDRD: >60 ML/MIN/1.73 M 2
GLOBULIN SER CALC-MCNC: 2.3 G/DL (ref 1.9–3.5)
GLUCOSE SERPL-MCNC: 91 MG/DL (ref 65–99)
HCT VFR BLD AUTO: 25 % (ref 37–47)
HCT VFR BLD AUTO: 27.5 % (ref 37–47)
HGB BLD-MCNC: 7.8 G/DL (ref 12–16)
HGB BLD-MCNC: 8.5 G/DL (ref 12–16)
LYMPHOCYTES # BLD AUTO: 1.43 K/UL (ref 1–4.8)
LYMPHOCYTES NFR BLD: 23.9 % (ref 22–41)
MAGNESIUM SERPL-MCNC: 1.6 MG/DL (ref 1.5–2.5)
MANUAL DIFF BLD: NORMAL
MCH RBC QN AUTO: 28.7 PG (ref 27–33)
MCH RBC QN AUTO: 29 PG (ref 27–33)
MCHC RBC AUTO-ENTMCNC: 30.9 G/DL (ref 33.6–35)
MCHC RBC AUTO-ENTMCNC: 31.2 G/DL (ref 33.6–35)
MCV RBC AUTO: 92.9 FL (ref 81.4–97.8)
MCV RBC AUTO: 92.9 FL (ref 81.4–97.8)
MICROCYTES BLD QL SMEAR: ABNORMAL
MONOCYTES # BLD AUTO: 0.26 K/UL (ref 0–0.85)
MONOCYTES NFR BLD AUTO: 4.4 % (ref 0–13.4)
MORPHOLOGY BLD-IMP: NORMAL
MYELOCYTES NFR BLD MANUAL: 0.9 %
NEUTROPHILS # BLD AUTO: 4.09 K/UL (ref 2–7.15)
NEUTROPHILS NFR BLD: 68.1 % (ref 44–72)
NEUTS HYPERSEG BLD QL SMEAR: NORMAL
NRBC # BLD AUTO: 0.02 K/UL
NRBC BLD AUTO-RTO: 0.3 /100 WBC
PHOSPHATE SERPL-MCNC: 2.6 MG/DL (ref 2.5–4.5)
PLATELET # BLD AUTO: 154 K/UL (ref 164–446)
PLATELET # BLD AUTO: 173 K/UL (ref 164–446)
PLATELET BLD QL SMEAR: NORMAL
PMV BLD AUTO: 9.2 FL (ref 9–12.9)
PMV BLD AUTO: 9.6 FL (ref 9–12.9)
POTASSIUM SERPL-SCNC: 4.2 MMOL/L (ref 3.6–5.5)
PROT SERPL-MCNC: 5.1 G/DL (ref 6–8.2)
RBC # BLD AUTO: 2.69 M/UL (ref 4.2–5.4)
RBC # BLD AUTO: 2.96 M/UL (ref 4.2–5.4)
RBC BLD AUTO: PRESENT
SIGNIFICANT IND 70042: NORMAL
SITE SITE: NORMAL
SODIUM SERPL-SCNC: 140 MMOL/L (ref 135–145)
SOURCE SOURCE: NORMAL
WBC # BLD AUTO: 6 K/UL (ref 4.8–10.8)
WBC # BLD AUTO: 6.3 K/UL (ref 4.8–10.8)

## 2017-06-04 PROCEDURE — 700102 HCHG RX REV CODE 250 W/ 637 OVERRIDE(OP): Performed by: STUDENT IN AN ORGANIZED HEALTH CARE EDUCATION/TRAINING PROGRAM

## 2017-06-04 PROCEDURE — 85027 COMPLETE CBC AUTOMATED: CPT

## 2017-06-04 PROCEDURE — 99231 SBSQ HOSP IP/OBS SF/LOW 25: CPT | Mod: GC | Performed by: HOSPITALIST

## 2017-06-04 PROCEDURE — 700102 HCHG RX REV CODE 250 W/ 637 OVERRIDE(OP): Performed by: HOSPITALIST

## 2017-06-04 PROCEDURE — 80053 COMPREHEN METABOLIC PANEL: CPT

## 2017-06-04 PROCEDURE — A9270 NON-COVERED ITEM OR SERVICE: HCPCS | Performed by: INTERNAL MEDICINE

## 2017-06-04 PROCEDURE — 700105 HCHG RX REV CODE 258: Performed by: INTERNAL MEDICINE

## 2017-06-04 PROCEDURE — 700102 HCHG RX REV CODE 250 W/ 637 OVERRIDE(OP): Performed by: COLON & RECTAL SURGERY

## 2017-06-04 PROCEDURE — A9270 NON-COVERED ITEM OR SERVICE: HCPCS | Performed by: HOSPITALIST

## 2017-06-04 PROCEDURE — A9270 NON-COVERED ITEM OR SERVICE: HCPCS | Performed by: STUDENT IN AN ORGANIZED HEALTH CARE EDUCATION/TRAINING PROGRAM

## 2017-06-04 PROCEDURE — 83735 ASSAY OF MAGNESIUM: CPT

## 2017-06-04 PROCEDURE — 700111 HCHG RX REV CODE 636 W/ 250 OVERRIDE (IP): Performed by: STUDENT IN AN ORGANIZED HEALTH CARE EDUCATION/TRAINING PROGRAM

## 2017-06-04 PROCEDURE — 700102 HCHG RX REV CODE 250 W/ 637 OVERRIDE(OP): Performed by: INTERNAL MEDICINE

## 2017-06-04 PROCEDURE — 84100 ASSAY OF PHOSPHORUS: CPT

## 2017-06-04 PROCEDURE — A9270 NON-COVERED ITEM OR SERVICE: HCPCS | Performed by: COLON & RECTAL SURGERY

## 2017-06-04 PROCEDURE — 700111 HCHG RX REV CODE 636 W/ 250 OVERRIDE (IP): Performed by: INTERNAL MEDICINE

## 2017-06-04 PROCEDURE — 36415 COLL VENOUS BLD VENIPUNCTURE: CPT

## 2017-06-04 PROCEDURE — 770020 HCHG ROOM/CARE - TELE (206)

## 2017-06-04 PROCEDURE — 85007 BL SMEAR W/DIFF WBC COUNT: CPT

## 2017-06-04 RX ORDER — SUCRALFATE ORAL 1 G/10ML
1 SUSPENSION ORAL EVERY 6 HOURS
Qty: 30 ML | Refills: 0 | Status: SHIPPED | OUTPATIENT
Start: 2017-06-04 | End: 2017-08-04

## 2017-06-04 RX ORDER — OMEPRAZOLE 20 MG/1
20 CAPSULE, DELAYED RELEASE ORAL DAILY
Qty: 30 CAP | Refills: 3 | Status: SHIPPED | OUTPATIENT
Start: 2017-06-04

## 2017-06-04 RX ORDER — CHOLESTYRAMINE LIGHT 4 G/5.7G
4 POWDER, FOR SUSPENSION ORAL 2 TIMES DAILY
Qty: 52 PACKET | Refills: 3 | Status: SHIPPED | OUTPATIENT
Start: 2017-06-04 | End: 2017-06-30

## 2017-06-04 RX ORDER — PREDNISONE 5 MG/1
15 TABLET ORAL DAILY
Qty: 30 TAB | Refills: 0 | Status: SHIPPED | OUTPATIENT
Start: 2017-06-04 | End: 2017-07-18

## 2017-06-04 RX ORDER — ERGOCALCIFEROL 1.25 MG/1
50000 CAPSULE ORAL
Qty: 7 CAP | Refills: 0 | Status: SHIPPED | OUTPATIENT
Start: 2017-06-04 | End: 2017-07-29

## 2017-06-04 RX ADMIN — SUCRALFATE 1 G: 1 SUSPENSION ORAL at 05:24

## 2017-06-04 RX ADMIN — OMEPRAZOLE 20 MG: 20 CAPSULE, DELAYED RELEASE ORAL at 21:04

## 2017-06-04 RX ADMIN — OMEPRAZOLE 20 MG: 20 CAPSULE, DELAYED RELEASE ORAL at 10:00

## 2017-06-04 RX ADMIN — Medication 500 MG: at 17:47

## 2017-06-04 RX ADMIN — CALCIUM POLYCARBOPHIL 625 MG: 625 TABLET, FILM COATED ORAL at 10:00

## 2017-06-04 RX ADMIN — DIBASIC SODIUM PHOSPHATE, MONOBASIC POTASSIUM PHOSPHATE AND MONOBASIC SODIUM PHOSPHATE 1 TABLET: 852; 155; 130 TABLET ORAL at 23:48

## 2017-06-04 RX ADMIN — PREDNISONE 15 MG: 5 TABLET ORAL at 10:00

## 2017-06-04 RX ADMIN — MAGNESIUM GLUCONATE 500 MG ORAL TABLET 400 MG: 500 TABLET ORAL at 10:00

## 2017-06-04 RX ADMIN — SUCRALFATE 1 G: 1 SUSPENSION ORAL at 23:48

## 2017-06-04 RX ADMIN — SUCRALFATE 1 G: 1 SUSPENSION ORAL at 13:28

## 2017-06-04 RX ADMIN — POTASSIUM CHLORIDE: 2 INJECTION, SOLUTION, CONCENTRATE INTRAVENOUS at 13:28

## 2017-06-04 RX ADMIN — CALCIUM POLYCARBOPHIL 625 MG: 625 TABLET, FILM COATED ORAL at 17:47

## 2017-06-04 RX ADMIN — DIBASIC SODIUM PHOSPHATE, MONOBASIC POTASSIUM PHOSPHATE AND MONOBASIC SODIUM PHOSPHATE 1 TABLET: 852; 155; 130 TABLET ORAL at 17:47

## 2017-06-04 RX ADMIN — Medication 500 MG: at 10:00

## 2017-06-04 RX ADMIN — CHOLESTYRAMINE 4 G: 4 POWDER, FOR SUSPENSION ORAL at 21:04

## 2017-06-04 RX ADMIN — SUCRALFATE 1 G: 1 SUSPENSION ORAL at 17:47

## 2017-06-04 RX ADMIN — CHOLESTYRAMINE 4 G: 4 POWDER, FOR SUSPENSION ORAL at 10:00

## 2017-06-04 RX ADMIN — CALCIUM POLYCARBOPHIL 625 MG: 625 TABLET, FILM COATED ORAL at 13:28

## 2017-06-04 RX ADMIN — DIBASIC SODIUM PHOSPHATE, MONOBASIC POTASSIUM PHOSPHATE AND MONOBASIC SODIUM PHOSPHATE 1 TABLET: 852; 155; 130 TABLET ORAL at 13:28

## 2017-06-04 RX ADMIN — DIBASIC SODIUM PHOSPHATE, MONOBASIC POTASSIUM PHOSPHATE AND MONOBASIC SODIUM PHOSPHATE 1 TABLET: 852; 155; 130 TABLET ORAL at 05:24

## 2017-06-04 ASSESSMENT — PAIN SCALES - GENERAL
PAINLEVEL_OUTOF10: 5
PAINLEVEL_OUTOF10: 5
PAINLEVEL_OUTOF10: 0
PAINLEVEL_OUTOF10: 5

## 2017-06-04 ASSESSMENT — LIFESTYLE VARIABLES: DO YOU DRINK ALCOHOL: NO
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
## 2017-06-04 NOTE — PROGRESS NOTES
Pt calm and resting in bed. No changes in status at this time. Pt patiently waiting for discharge, pending [facility] van for transportation home.

## 2017-06-04 NOTE — CARE PLAN
Problem: Communication  Goal: The ability to communicate needs accurately and effectively will improve  Intervention: Educate patient and significant other/support system about the plan of care, procedures, treatments, medications and allow for questions  Discuss plan of care. Address concerns and questions.       Problem: Safety  Goal: Will remain free from injury  Intervention: Provide assistance with mobility  Educate pt to call for assistance.       Problem: Mobility  Goal: Risk for activity intolerance will decrease  Intervention: Assess and monitor signs of activity intolerance  Encourage pt to ambulate.

## 2017-06-04 NOTE — CARE PLAN
"Problem: Bowel/Gastric:  Goal: Normal bowel function is maintained or improved  Outcome: PROGRESSING AS EXPECTED  Pt having regular BM's, stating, \"I'm having firm normal bowel movements again.\" Will continue to encourage patient to eat meals and monitor lab values for changes.     Problem: Discharge Barriers/Planning  Goal: Patient’s continuum of care needs will be met  Intervention: Collaborate with Transitional Care Team and Interdisciplinary Team to meet discharge needs  Social work involved with discharge. In contact with [facility] for transportation to patient's home in [place].          "

## 2017-06-04 NOTE — PROGRESS NOTES
Received report from RN. Assumed pt care. Assessment completed. A&Ox4. Pain 5/10. O2 98% on RA.   Pt is currently in bed. Denies SOB, chest pain, dizziness.   Pt upself, pt calls appropriately. Bed in lowest position, bed locked, RN and CNA numbers provided, no further needs at this time. Safety precautions in place. Hourly rounding in progress.
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
## 2017-06-04 NOTE — PROGRESS NOTES
Notified patient of patient's friend's phone call.  She reported that her friend is crazy because she has celiac disease.  She states she has always been crazy.  RN notified.

## 2017-06-05 VITALS
RESPIRATION RATE: 20 BRPM | HEART RATE: 80 BPM | OXYGEN SATURATION: 98 % | BODY MASS INDEX: 33.32 KG/M2 | TEMPERATURE: 97.3 F | WEIGHT: 188.05 LBS | SYSTOLIC BLOOD PRESSURE: 151 MMHG | HEIGHT: 63 IN | DIASTOLIC BLOOD PRESSURE: 94 MMHG

## 2017-06-05 LAB
ERYTHROCYTE [DISTWIDTH] IN BLOOD BY AUTOMATED COUNT: 45.6 FL (ref 35.9–50)
HCT VFR BLD AUTO: 26 % (ref 37–47)
HGB BLD-MCNC: 8 G/DL (ref 12–16)
IGA SERPL-MCNC: 259 MG/DL (ref 68–408)
MCH RBC QN AUTO: 28.8 PG (ref 27–33)
MCHC RBC AUTO-ENTMCNC: 30.8 G/DL (ref 33.6–35)
MCV RBC AUTO: 93.5 FL (ref 81.4–97.8)
PLATELET # BLD AUTO: 186 K/UL (ref 164–446)
PMV BLD AUTO: 9.7 FL (ref 9–12.9)
RBC # BLD AUTO: 2.78 M/UL (ref 4.2–5.4)
TTG IGA SER IA-ACNC: 1 U/ML (ref 0–3)
WBC # BLD AUTO: 6.3 K/UL (ref 4.8–10.8)

## 2017-06-05 PROCEDURE — 700111 HCHG RX REV CODE 636 W/ 250 OVERRIDE (IP): Performed by: STUDENT IN AN ORGANIZED HEALTH CARE EDUCATION/TRAINING PROGRAM

## 2017-06-05 PROCEDURE — A9270 NON-COVERED ITEM OR SERVICE: HCPCS | Performed by: HOSPITALIST

## 2017-06-05 PROCEDURE — 700102 HCHG RX REV CODE 250 W/ 637 OVERRIDE(OP): Performed by: COLON & RECTAL SURGERY

## 2017-06-05 PROCEDURE — 700102 HCHG RX REV CODE 250 W/ 637 OVERRIDE(OP): Performed by: STUDENT IN AN ORGANIZED HEALTH CARE EDUCATION/TRAINING PROGRAM

## 2017-06-05 PROCEDURE — A9270 NON-COVERED ITEM OR SERVICE: HCPCS | Performed by: COLON & RECTAL SURGERY

## 2017-06-05 PROCEDURE — 700102 HCHG RX REV CODE 250 W/ 637 OVERRIDE(OP): Performed by: INTERNAL MEDICINE

## 2017-06-05 PROCEDURE — 85027 COMPLETE CBC AUTOMATED: CPT

## 2017-06-05 PROCEDURE — 700102 HCHG RX REV CODE 250 W/ 637 OVERRIDE(OP): Performed by: HOSPITALIST

## 2017-06-05 PROCEDURE — A9270 NON-COVERED ITEM OR SERVICE: HCPCS | Performed by: INTERNAL MEDICINE

## 2017-06-05 PROCEDURE — 99239 HOSP IP/OBS DSCHRG MGMT >30: CPT | Mod: GC | Performed by: HOSPITALIST

## 2017-06-05 PROCEDURE — 36415 COLL VENOUS BLD VENIPUNCTURE: CPT

## 2017-06-05 PROCEDURE — A9270 NON-COVERED ITEM OR SERVICE: HCPCS | Performed by: STUDENT IN AN ORGANIZED HEALTH CARE EDUCATION/TRAINING PROGRAM

## 2017-06-05 RX ADMIN — DIBASIC SODIUM PHOSPHATE, MONOBASIC POTASSIUM PHOSPHATE AND MONOBASIC SODIUM PHOSPHATE 1 TABLET: 852; 155; 130 TABLET ORAL at 11:38

## 2017-06-05 RX ADMIN — CALCIUM POLYCARBOPHIL 625 MG: 625 TABLET, FILM COATED ORAL at 08:23

## 2017-06-05 RX ADMIN — Medication 500 MG: at 08:22

## 2017-06-05 RX ADMIN — MAGNESIUM GLUCONATE 500 MG ORAL TABLET 400 MG: 500 TABLET ORAL at 09:00

## 2017-06-05 RX ADMIN — CALCIUM POLYCARBOPHIL 625 MG: 625 TABLET, FILM COATED ORAL at 11:38

## 2017-06-05 RX ADMIN — DIBASIC SODIUM PHOSPHATE, MONOBASIC POTASSIUM PHOSPHATE AND MONOBASIC SODIUM PHOSPHATE 1 TABLET: 852; 155; 130 TABLET ORAL at 05:49

## 2017-06-05 RX ADMIN — PREDNISONE 15 MG: 5 TABLET ORAL at 08:22

## 2017-06-05 RX ADMIN — CHOLESTYRAMINE 4 G: 4 POWDER, FOR SUSPENSION ORAL at 08:22

## 2017-06-05 RX ADMIN — OMEPRAZOLE 20 MG: 20 CAPSULE, DELAYED RELEASE ORAL at 08:22

## 2017-06-05 RX ADMIN — SUCRALFATE 1 G: 1 SUSPENSION ORAL at 11:38

## 2017-06-05 RX ADMIN — SUCRALFATE 1 G: 1 SUSPENSION ORAL at 05:49

## 2017-06-05 ASSESSMENT — ENCOUNTER SYMPTOMS
ABDOMINAL PAIN: 0
HEADACHES: 0
TINGLING: 0
CONSTIPATION: 0
BLURRED VISION: 0
DOUBLE VISION: 0
PALPITATIONS: 0
NAUSEA: 0
WEAKNESS: 1
MYALGIAS: 0
SPUTUM PRODUCTION: 0
HEARTBURN: 0
HEMOPTYSIS: 0
FEVER: 0
DEPRESSION: 0
NECK PAIN: 0
BLOOD IN STOOL: 0
DIARRHEA: 1
CHILLS: 0
DIZZINESS: 0
DIAPHORESIS: 0
VOMITING: 0

## 2017-06-05 ASSESSMENT — PAIN SCALES - GENERAL
PAINLEVEL_OUTOF10: 0

## 2017-06-05 ASSESSMENT — LIFESTYLE VARIABLES
SUBSTANCE_ABUSE: 0
DO YOU DRINK ALCOHOL: NO

## 2017-06-05 NOTE — PROGRESS NOTES
"                               [facility] Interval Note    Name Brandi Leggett     1965   Age/Sex 51 y.o. female   MRN [id number]   Code Status Full     After 5PM or if no immediate response to page, please call for cross-coverage  Attending/Team: Dr Ferguson/Ranjan  Call [phone] to page   1st Call - Day Intern (R1):   Dr Perez 2nd Call - Day Sr. Resident (R2/R3):   Dr Herrera         Chief complaint/ reason for interval visit (Primary Diagnosis)   Abdominal pain/N/V/septic shock.     Interval Problem Daily Status Update:  In ICU from >> then floor tele .    Stable, no diarrhea and her electrolytes are normal, ready to go home.  The patient states she is safe at home.  Working with  for transportation the patient.       Principal Problem:    Septic shock (CMS-HCC) POA: Yes      Overview: - WBC: 16 on admission Lactic acid: 6      - needed norepinephrine for low blood pressure in the ICU.       - Blood culture negative.       - Cdiff PCR+, toxin AB NGT ;        - CT abd/pelvis at outside facility without contrast negative for       intraabdominal pathology        - USG shows \"distended gall bladder, No evidence of gallstone or biliary       ductal dilatation, Enlarged echogenic liver consistent with fatty change       versus hepatocellular dysfunction\".        - Cortizone 10 on admission.       - HIDA:  No evidence for acute cholecystitis or common bile duct       obstruction.  Active Problems:    Diarrhea POA: Yes      Overview: Cdiff PCR+, toxin AB NGT.      No WBC in stool.     Abdominal pain POA: Yes      Overview: - US abdomen shows distended gallbladder without evidence of biliary duct       dilation or gallstone, fatty liver disease      - AST: 46>>>61>28      - ALT: 36>>62>>43      - Al phosphatase: 179>>>233>>170      - GGT: 1059>>881      HIDA: normal.     Electrolyte depletion POA: Yes      Overview: K: 2.7 and M.6 and Ca: 8 corrected.     GI bleeding POA: " Yes      Overview: Hb: 11 on admission >>>9>>7.7      MCV: 88       FOBT: Positive.       EGD/Colonoscopy on 6/1/17: ulcerative esophagitis, gastritis, duodenitis,       cecal AVMs x three, hemorrhoids.     SLE (systemic lupus erythematosus) (CMS-HCC) (Chronic) POA: Yes      Overview: stable    Chronic rheumatic arthritis (CMS-HCC) (Chronic) POA: Yes      Overview: - on home methotrexate, humira, prednisone            Alcohol abuse (Chronic) POA: Yes      Overview: - Maddrey score 11.3        - quit alcohol since her father passed away on 3/2017        - US abdomen shows distended gallbladder without evidence of biliary duct       dilation or gallstone, fatty liver disease.      - AST/ALT: 62/62    RENEE (acute kidney injury) (CMS-[facility]) POA: Yes      Overview: On admission Cr: 4.1      U/S renal with no abnormalities    Low vitamin D level (Chronic) POA: Yes      Overview: Vit D is 8 on 6/1/17      DEXA scan: osteopenia.     Hyperlipidemia (Chronic) POA: Yes    HTN (hypertension) (Chronic) POA: Yes      Overview: On admission: 180/100  Resolved Problems:    * No resolved hospital problems. *      Review of Systems   Constitutional: Positive for malaise/fatigue. Negative for fever, chills and diaphoresis.   HENT: Negative for tinnitus.    Eyes: Negative for blurred vision and double vision.   Respiratory: Negative for hemoptysis and sputum production.    Cardiovascular: Negative for chest pain and palpitations.   Gastrointestinal: Positive for diarrhea. Negative for heartburn, nausea, vomiting, abdominal pain, constipation, blood in stool and melena.   Genitourinary: Negative for dysuria, urgency and frequency.   Musculoskeletal: Negative for myalgias and neck pain.   Skin: Negative for rash.   Neurological: Positive for weakness. Negative for dizziness, tingling and headaches.   Psychiatric/Behavioral: Negative for depression, suicidal ideas and substance abuse.       Consultants/Specialty  none    Disposition  Home      Quality Measures  EKG reviewed, Labs reviewed, Radiology images reviewed and Medications reviewed  Kovacs catheter: No Kovacs      DVT: GI bleeding on SCDs.  DVT prophylaxis - mechanical: SCDs                Physical Exam       Filed Vitals:    06/04/17 1600 06/04/17 1955 06/04/17 2332 06/05/17 0345   BP: 140/98 133/87 124/75 115/71   Pulse: 79 66 86 76   Temp: 36.3 °C (97.3 °F) 36.6 °C (97.9 °F) 36.7 °C (98 °F) 36.5 °C (97.7 °F)   Resp: 18 18 18 17   Height:       Weight:  85.3 kg (188 lb 0.8 oz)     SpO2: 94% 98% 97% 98%     Body mass index is 33.32 kg/(m^2). Weight: 85.3 kg (188 lb 0.8 oz)  Oxygen Therapy:  Pulse Oximetry: 98 %, O2 (LPM): 0, O2 Delivery: None (Room Air)    Physical Exam   Constitutional: She is oriented to person, place, and time and well-developed, well-nourished, and in no distress. No distress.   Neck: No JVD present. No tracheal deviation present. No thyromegaly present.   Cardiovascular: Normal rate.    No murmur heard.  Pulmonary/Chest: No respiratory distress. She has no wheezes. She has no rales.   Abdominal: She exhibits no distension. There is tenderness. There is no rebound and no guarding.   Musculoskeletal: She exhibits no edema.   Neurological: She is alert and oriented to person, place, and time. No cranial nerve deficit.   Skin: Skin is warm. No rash noted. No erythema.         Lab Data Review:      6/1/2017  5:17 PM    Recent Labs      06/03/17   0254  06/04/17   0451   SODIUM  139  140   POTASSIUM  3.5*  4.2   CHLORIDE  108  111   CO2  25  22   BUN  8  7*   CREATININE  0.68  0.60   MAGNESIUM  1.3*  1.6   PHOSPHORUS  1.7*  2.6   CALCIUM  7.7*  8.4*       Recent Labs      06/02/17   0919  06/03/17   0254  06/04/17   0451   ALTSGPT   --   43  46   ASTSGOT   --   28  31   ALKPHOSPHAT   --   170*  178*   TBILIRUBIN   --   0.3  0.3   GAMMAGT  881*   --    --    GLUCOSE   --   115*  91       Recent Labs      06/04/17   0451  06/04/17   1703  06/05/17   0435   RBC  2.69*  2.96*  2.78*  "  HEMOGLOBIN  7.8*  8.5*  8.0*   HEMATOCRIT  25.0*  27.5*  26.0*   PLATELETCT  154*  173  186       Recent Labs      06/03/17   0254   06/04/17   0451  06/04/17   1703  06/05/17   0435   WBC  5.5   < >  6.0  6.3  6.3   NEUTSPOLYS  52.60   --   68.10   --    --    LYMPHOCYTES  32.80   --   23.90   --    --    MONOCYTES  11.50   --   4.40   --    --    EOSINOPHILS  1.30   --   1.80   --    --    BASOPHILS  0.20   --   0.90   --    --    ASTSGOT  28   --   31   --    --    ALTSGPT  43   --   46   --    --    ALKPHOSPHAT  170*   --   178*   --    --    TBILIRUBIN  0.3   --   0.3   --    --     < > = values in this interval not displayed.           Assessment/Plan     * Septic shock (CMS-HCC) (present on admission)  Overview  - WBC: 16 on admission Lactic acid: 6  - needed norepinephrine for low blood pressure in the ICU.   - Blood culture negative.   - Cdiff PCR+, toxin AB NGT ;    - CT abd/pelvis at outside facility without contrast negative for intraabdominal pathology    - USG shows \"distended gall bladder, No evidence of gallstone or biliary ductal dilatation, Enlarged echogenic liver consistent with fatty change versus hepatocellular dysfunction\".    - Cortizone 10 on admission.   - HIDA:  No evidence for acute cholecystitis or common bile duct obstruction.    Assessment & Plan  Was given vanco PO for  3 5/17>>5/29 days and Zosyn  for 5 days 5/27>>5/31  No source of infection.   Resolved.   No surgical intervention planned from Surgery ( Dr. Ibrahim), recommended conservative management.  She still has diarrhea 3-4 loose stool black.   Likely infection intra abdomen, adrenal failure(she was on prednisone and ran it out before admission.  Stable now no fever and no hypotension.    Monitor H&H  Continue on prednisone  No abx at this time and resume zosyn or vanco PO if needed.     Diarrhea (present on admission)  Overview  Cdiff PCR+, toxin AB NGT.  No WBC in stool.     Assessment & Plan  Likely related to GI " bleeding  Started cholystramine on 6/3/17  IV fluid and monitor H&H  Resume Vanco PO if needed.     Abdominal pain (present on admission)  Overview  - US abdomen shows distended gallbladder without evidence of biliary duct dilation or gallstone, fatty liver disease  - AST: 46>>>61>28  - ALT: 36>>62>>43  - Al phosphatase: 179>>>233>>170  - GGT: 1059>>881  HIDA: normal.     Assessment & Plan  Gets better but still generalized pain      Electrolyte depletion (present on admission)  Overview  K: 2.7 and M.6 and Ca: 8 corrected.     Assessment & Plan  replacement  Follow up.     GI bleeding (present on admission)  Overview  Hb: 11 on admission >>>9>>7.7  MCV: 88   FOBT: Positive.   EGD/Colonoscopy on 17: ulcerative esophagitis, gastritis, duodenitis, cecal AVMs x three, hemorrhoids.     Assessment & Plan  Monitor H&H and blood transfusion if Hb less than 7  Omeprazole 20 mg BID and sucralfate.   EGD in 12 weeks  Colonoscopy in 7-10 years.   Follow up pathology.     SLE (systemic lupus erythematosus) (CMS-HCC) (present on admission)  Overview  stable    Assessment & Plan  - on  prednisone 15 mg daily,   - continue to hold methotrexate and humira in the setting of infection     Chronic rheumatic arthritis (CMS-HCC) (present on admission)  Overview  - on home methotrexate, humira, prednisone        Assessment & Plan  - on  prednisone 15 mg daily,   - continue to hold methotrexate and humira at the hospital.     Alcohol abuse (present on admission)  Overview  - Maddrey score 11.3    - quit alcohol since her father passed away on 3/2017    - US abdomen shows distended gallbladder without evidence of biliary duct dilation or gallstone, fatty liver disease.  - AST/ALT: 62/62    Assessment & Plan  Encouraged the patient to stay away from alcohol.     Hyperlipidemia (present on admission)  Assessment & Plan  - held home gemfibrozil, pravastatin, restart once LFT stable    HTN (hypertension) (present on  admission)  Overview  On admission: 180/100    Assessment & Plan  Home med: lisinopril >>was held due to RENEE  On amlodipine 5 mg daily  Follow up and resume lisinopril if needed.     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  nephro was on board >>did not need dialysis.   Cr: 0.8>>0.6  On prednisone and fluid.       Low vitamin D level (present on admission)  Overview  Vit D is 8 on 6/1/17  DEXA scan: osteopenia.     Assessment & Plan  D2 50,000 weekly.   Ca gluconate.

## 2017-06-05 NOTE — DISCHARGE INSTRUCTIONS
Discharge Instructions    Discharged to home by [facility] with escort. Discharged via wheelchair, hospital escort: Yes.  Special equipment needed: Not Applicable    Be sure to schedule a follow-up appointment with your primary care doctor or any specialists as instructed.     Discharge Plan:   Diet Plan: Discussed  Activity Level: Discussed  Confirmed Follow up Appointment: Patient to Call and Schedule Appointment  Confirmed Symptoms Management: Discussed  Medication Reconciliation Updated: Yes  Pneumococcal Vaccine Given - only chart on this line when given: Given (See MAR)  Influenza Vaccine Indication: Indicated: Not available from distributor/    I understand that a diet low in cholesterol, fat, and sodium is recommended for good health. Unless I have been given specific instructions below for another diet, I accept this instruction as my diet prescription.   Other diet: REGULAR    Special Instructions: None    · Is patient discharged on Warfarin / Coumadin?   No     · Is patient Post Blood Transfusion?  No    Depression / Suicide Risk    As you are discharged from this [facility] facility, it is important to learn how to keep safe from harming yourself.    Recognize the warning signs:  · Abrupt changes in personality, positive or negative- including increase in energy   · Giving away possessions  · Change in eating patterns- significant weight changes-  positive or negative  · Change in sleeping patterns- unable to sleep or sleeping all the time   · Unwillingness or inability to communicate  · Depression  · Unusual sadness, discouragement and loneliness  · Talk of wanting to die  · Neglect of personal appearance   · Rebelliousness- reckless behavior  · Withdrawal from people/activities they love  · Confusion- inability to concentrate     If you or a loved one observes any of these behaviors or has concerns about self-harm, here's what you can do:  · Talk about it- your feelings and reasons for  harming yourself  · Remove any means that you might use to hurt yourself (examples: pills, rope, extension cords, firearm)  · Get professional help from the community (Mental Health, Substance Abuse, psychological counseling)  · Do not be alone:Call your Safe Contact- someone whom you trust who will be there for you.  · Call your local CRISIS HOTLINE 303-7723 or 596-015-8277  · Call your local Children's Mobile Crisis Response Team [place] (156) 739-7207 or wwwLocal Funeral  · Call the toll free National Suicide Prevention Hotlines   · National Suicide Prevention Lifeline 760-283-KTBN (5618)  · National Hope Line Network 800-SUICIDE (833-7958)    Gastrointestinal Bleeding  Gastrointestinal (GI) bleeding means there is bleeding somewhere along the digestive tract, between the mouth and anus.  CAUSES   There are many different problems that can cause GI bleeding. Possible causes include:  · Esophagitis. This is inflammation, irritation, or swelling of the esophagus.  · Hemorrhoids. These are veins that are full of blood (engorged) in the rectum. They cause pain, inflammation, and may bleed.  · Anal fissures. These are areas of painful tearing which may bleed. They are often caused by passing hard stool.  · Diverticulosis. These are pouches that form on the colon over time, with age, and may bleed significantly.  · Diverticulitis. This is inflammation in areas with diverticulosis. It can cause pain, fever, and bloody stools, although bleeding is rare.  · Polyps and cancer. Colon cancer often starts out as precancerous polyps.  · Gastritis and ulcers. Bleeding from the upper gastrointestinal tract (near the stomach) may travel through the intestines and produce black, sometimes tarry, often bad smelling stools. In certain cases, if the bleeding is fast enough, the stools may not be black, but red. This condition may be life-threatening.  SYMPTOMS   · Vomiting bright red blood or material that looks like coffee  grounds.  · Bloody, black, or tarry stools.  DIAGNOSIS   Your caregiver may diagnose your condition by taking your history and performing a physical exam. More tests may be needed, including:  · X-rays and other imaging tests.  · Esophagogastroduodenoscopy (EGD). This test uses a flexible, lighted tube to look at your esophagus, stomach, and small intestine.  · Colonoscopy. This test uses a flexible, lighted tube to look at your colon.  TREATMENT   Treatment depends on the cause of your bleeding.   · For bleeding from the esophagus, stomach, small intestine, or colon, the caregiver doing your EGD or colonoscopy may be able to stop the bleeding as part of the procedure.  · Inflammation or infection of the colon can be treated with medicines.  · Many rectal problems can be treated with creams, suppositories, or warm baths.  · Surgery is sometimes needed.  · Blood transfusions are sometimes needed if you have lost a lot of blood.  If bleeding is slow, you may be allowed to go home. If there is a lot of bleeding, you will need to stay in the hospital for observation.  HOME CARE INSTRUCTIONS   · Take any medicines exactly as prescribed.  · Keep your stools soft by eating foods that are high in fiber. These foods include whole grains, legumes, fruits, and vegetables. Prunes (1 to 3 a day) work well for many people.  · Drink enough fluids to keep your urine clear or pale yellow.  SEEK IMMEDIATE MEDICAL CARE IF:   · Your bleeding increases.  · You feel lightheaded, weak, or you faint.  · You have severe cramps in your back or abdomen.  · You pass large blood clots in your stool.  · Your problems are getting worse.  MAKE SURE YOU:   · Understand these instructions.  · Will watch your condition.  · Will get help right away if you are not doing well or get worse.     This information is not intended to replace advice given to you by your health care provider. Make sure you discuss any questions you have with your health care  provider.     Document Released: 12/15/2001 Document Revised: 12/04/2013 Document Reviewed: 11/26/2012  [employer] Interactive Patient Education ©2016 [employer]  Septic Shock  Septic shock is the final, most serious stage of the body's inflammatory response to an infection (sepsis). Sepsis happens when the chemicals that are produced by your body to fight infection cause inflammation through your entire body (systemic). This can lead to problems with your blood pressure that prevent your organs from getting the oxygen they need. Septic shock results when your organs begin to fail from the drop in blood pressure.  Infections that lead to sepsis often begin in the lungs, abdomen, urinary tract, reproductive system, or digestive system.  CAUSES  Septic shock can result from any bacterial, fungal, or viral infection in the body. Septic shock from a viral infection is rare.  RISK FACTORS  You may be more likely to develop septic shock from an infection if you:  · Are very young or very old.  · Have AIDS or another disease that weakens your body's defense system (immune system).  · Have diabetes.  · Have lymphoma or leukemia.  · Have a disease of the lungs, intestines, reproductive system, or urinary tract.  · Have been hospitalized for a long time, especially if you are using a catheter.  · Had a recent surgery or medical procedure.  · Have been taking antibiotic medicines or steroid medicines for a long time.  SIGNS AND SYMPTOMS  Signs and symptoms of septic shock may include:  · Low blood pressure.  · A rapid heart rate or heart palpitations.  · Shortness of breath.  · Rash or discolored skin.  · A very high or very low body temperature with chills.  · Reduced urine output.  · Feeling lightheaded, weak, or confused.  · Agitation or restlessness.  DIAGNOSIS  Your health care provider can diagnose septic shock based on your symptoms and your medical history. Your health care provider will also perform a physical exam.  Tests that will be done to confirm the diagnosis may include:  · Tests to check for infection by trying to grow (culture) bacteria from samples of:  · Blood.  · Urine.  · Brain and spinal fluid (cerebrospinal fluid or CSF).  · Mucus.  · Wound secretions.  · Imaging tests, such as:  · X-rays.  · Ultrasound.  · CT scans.  · MRI.  TREATMENT  Septic shock is a medical emergency. Treatment takes place in a hospital, usually in the intensive care unit (ICU). You may be given antibiotics through an IV tube immediately. Other possible treatments include:  · Medicine to increase blood pressure (vasopressor medicines).  · Steroids to reduce inflammation.  · IV fluids to help with symptoms of dehydration.  · Respirators or breathing machines to support breathing.  · Insulin to control blood sugar.  · Surgery to clean wounds or remove infected or dead tissue. This is needed in some cases.  · Dialysis.  SEEK IMMEDIATE MEDICAL CARE IF:  Septic shock is a serious problem that is a medical emergency. Do not wait to see if the symptoms will go away. Get medical help right away. Call your local emergency services (911 in the [place] Do not drive yourself to the hospital.     This information is not intended to replace advice given to you by your health care provider. Make sure you discuss any questions you have with your health care provider.     Document Released: 08/21/2015 Document Reviewed: 08/21/2015  [employer] Interactive Patient Education ©2016 [employer]

## 2017-06-05 NOTE — PROGRESS NOTES
Pt left with [facility] transport. Discharge instructions and recommendations explained, scripts given. Pt denies any needs at this point.

## 2017-06-05 NOTE — CARE PLAN
Problem: Infection  Goal: Will remain free from infection  Intervention: Assess signs and symptoms of infection  Patient is afebrile.  Stool is formed.  WBC count WDL.

## 2017-06-05 NOTE — DISCHARGE PLANNING
Medical Social Work    SW received order to assist pt with transportation to her home in [place]. Pt has Medicaid FFS. Transportation form completed and faxed to [facility].

## 2017-06-05 NOTE — PROGRESS NOTES
Assumed care of patient.  Patient is alert, oriented x4, pleasant.  No signs or symptoms of distress noted.  Special contact precautions in place for colonized c-diff.  Call light is in reach.  Plan of care discussed.  Will monitor per protocol.

## 2017-06-05 NOTE — PROGRESS NOTES
Bedside report from night shift nurse, assumed patient care.  Patient alert and oriented x 4, informed of POC , verbalized understanding.  No complaints of pain at this time.  Safety precautions in place, bed in low locked position, call light within reach.

## 2017-06-05 NOTE — PROGRESS NOTES
Placed a call to  [facility], no answer,  left. No word yet on [facility] service taking patient to [place].

## 2017-06-05 NOTE — DISCHARGE PLANNING
Received transport form from BARBARA Mercado at 1046. Contacted [facility] spoke to [facility] transport to call back with a time. Notified BARBARA Mercado via phone.

## 2017-06-05 NOTE — DISCHARGE PLANNING
Medical Social Work    Bedside RN received phone call from [facility] stating transportation will be here for pt within 1 hour.

## 2017-06-05 NOTE — CARE PLAN
Problem: Mobility  Goal: Risk for activity intolerance will decrease  Intervention: Assess and monitor signs of activity intolerance  Patient at baseline independent status

## 2017-06-06 ENCOUNTER — PATIENT OUTREACH (OUTPATIENT)
Dept: HEALTH INFORMATION MANAGEMENT | Facility: OTHER | Age: 52
End: 2017-06-06

## 2017-06-06 LAB
BACTERIA STL CULT: NORMAL
E COLI SXT1+2 STL IA: NORMAL
SIGNIFICANT IND 70042: NORMAL
SITE SITE: NORMAL
SMA IGG SER-ACNC: 6 UNITS (ref 0–19)
SOURCE SOURCE: NORMAL

## 2017-06-06 NOTE — PROGRESS NOTES
· Placed discharge outreach phone call to patient s/p hospital discharge 6/5/17.  Left voicemail with my contact information and instructions to return my phone call.

## 2017-06-29 ENCOUNTER — OFFICE VISIT (OUTPATIENT)
Dept: RHEUMATOLOGY | Facility: PHYSICIAN GROUP | Age: 52
End: 2017-06-29
Payer: MEDICAID

## 2017-06-29 VITALS
RESPIRATION RATE: 12 BRPM | BODY MASS INDEX: 29.94 KG/M2 | WEIGHT: 169 LBS | OXYGEN SATURATION: 100 % | TEMPERATURE: 98.1 F | SYSTOLIC BLOOD PRESSURE: 100 MMHG | HEART RATE: 132 BPM | DIASTOLIC BLOOD PRESSURE: 60 MMHG

## 2017-06-29 DIAGNOSIS — M19.90 INFLAMMATORY ARTHRITIS: ICD-10-CM

## 2017-06-29 DIAGNOSIS — M1A.9XX1 CHRONIC TOPHACEOUS GOUT: ICD-10-CM

## 2017-06-29 DIAGNOSIS — M32.9 SYSTEMIC LUPUS ERYTHEMATOSUS, UNSPECIFIED SLE TYPE, UNSPECIFIED ORGAN INVOLVEMENT STATUS (HCC): Chronic | ICD-10-CM

## 2017-06-29 DIAGNOSIS — R00.0 TACHYCARDIA: ICD-10-CM

## 2017-06-29 DIAGNOSIS — L40.50 PSORIATIC ARTHRITIS (HCC): ICD-10-CM

## 2017-06-29 PROCEDURE — 99214 OFFICE O/P EST MOD 30 MIN: CPT | Performed by: INTERNAL MEDICINE

## 2017-06-29 ASSESSMENT — ENCOUNTER SYMPTOMS
COUGH: 0
PHOTOPHOBIA: 0
EYE PAIN: 0
PALPITATIONS: 0
CONSTIPATION: 0
BLOOD IN STOOL: 0
DOUBLE VISION: 0
FEVER: 0
HEMOPTYSIS: 0
BACK PAIN: 1
HEADACHES: 0
NECK PAIN: 1
CHILLS: 0

## 2017-06-29 NOTE — MR AVS SNAPSHOT
Brandi Mariann Leggett   2017 1:30 PM   Office Visit   MRN: [id number]    Department:  Rheumatology [facility]   Dept Phone:  [phone]    Description:  Female : 1965   Provider:  Yamilka Mcknight M.D.           Reason for Visit     Follow-Up follow up      Allergies as of 2017     Allergen Noted Reactions    Codeine 2014         Vital Signs     Blood Pressure Pulse Temperature Respirations Weight Oxygen Saturation    100/60 mmHg 132 36.7 °C (98.1 °F) 12 76.658 kg (169 lb) 100%    Breastfeeding? Smoking Status                No Never Smoker           Basic Information     Date Of Birth Sex Race Ethnicity Preferred Language    1965 Female White Non- English      Your appointments     Sep 27, 2017  2:30 PM   Follow Up Visit with Yamilka Mcknight M.D.   [facility] (--)    [address] [place] [address], [address]  [facility] [id number] [phone]           You will be receiving a confirmation call a few days before your appointment from our automated call confirmation system.              Problem List              ICD-10-CM Priority Class Noted - Resolved    Vertigo R42   2014 - Present    Left arm swelling M79.89   2014 - Present    SLE (systemic lupus erythematosus) (CMS-[facility]) (Chronic) M32.9 Medium  Unknown - Present    Psoriatic arthritis (CMS-[facility]) L40.50   Unknown - Present    Pancreatitis K85.90   Unknown - Present    Right hand pain M79.641   9/15/2015 - Present    Hyperuricemia E79.0   9/15/2015 - Present    Polyarthralgia M25.50   9/15/2015 - Present    Chronic inflammatory arthritis M19.90   9/15/2015 - Present    Chronic tophaceous gout M1A.9XX1   2015 - Present    Drug-induced chronic pancreatitis (CMS-[facility]) K86.1   10/6/2015 - Present    Vitamin D deficiency E55.9   10/6/2015 - Present    Osteopenia M85.80   10/26/2015 - Present    Dysuria R30.0   2015 - Present    Abnormal LFTs R79.89   2016 - Present    Abdominal pain in female patient R10.9    1/14/2016 - Present    Xanthoma E75.5   2/10/2016 - Present    Hypertriglyceridemia E78.1   2/10/2016 - Present    Traumatic periorbital ecchymosis S00.10XA   4/4/2016 - Present    Chronic right shoulder pain M25.511, G89.29   6/2/2016 - Present    Essential hypertension I10   6/2/2016 - Present    Skin lesions L98.9   6/2/2016 - Present    Chronic fatigue R53.82   7/14/2016 - Present    Hyperlipidemia (Chronic) E78.5   7/14/2016 - Present    DDD (degenerative disc disease), cervical M50.30   7/14/2016 - Present    Septic shock (CMS-HCC) A41.9, R65.21 High  5/27/2017 - Present    Diarrhea R19.7 High  5/28/2017 - Present    RENEE (acute kidney injury) (CMS-HCC) N17.9 Low  5/28/2017 - Present    Abdominal pain R10.9 High  5/28/2017 - Present    Chronic rheumatic arthritis (CMS-HCC) (Chronic) M06.9 Medium  6/1/2017 - Present    Electrolyte depletion E87.8 High  6/1/2017 - Present    Alcohol abuse (Chronic) F10.10 Medium  6/1/2017 - Present    GI bleeding K92.2 High  6/1/2017 - Present    Low vitamin D level (Chronic) E55.9 Low  6/1/2017 - Present    HTN (hypertension) (Chronic) I10   6/1/2017 - Present      Health Maintenance        Date Due Completion Dates    IMM HEP B VACCINE (1 of 3 - Primary Series) 1965 ---    DIABETES MONOFILAMENT / LE EXAM 6/14/1968 ---    RETINAL SCREENING 12/14/1985 ---    URINE ACR / MICROALBUMIN 12/14/1985 ---    IMM DTaP/Tdap/Td Vaccine (1 - Tdap) 12/14/1986 ---    PAP SMEAR 12/14/1988 ---    MAMMOGRAM 8/5/2009 8/5/2008, 8/5/2008    A1C SCREENING 3/21/2016 9/21/2015    FASTING LIPID PROFILE 9/21/2016 9/21/2015, 6/22/2015    SERUM CREATININE 6/4/2018 6/4/2017, 6/3/2017, 6/2/2017, 6/1/2017, 6/1/2017, 6/1/2017, 5/31/2017, 5/31/2017, 5/31/2017, 5/30/2017, 5/29/2017, 5/28/2017, 5/27/2017, 5/27/2017, 5/27/2017, 3/30/2017, 11/30/2016, 7/22/2016, 2/10/2016, 12/30/2015, 12/14/2015, 10/21/2015, 9/21/2015, 9/16/2015, 6/22/2015, 2/24/2015, 11/5/2014, 11/4/2014, 6/2/2014, 1/22/2014, 1/21/2014,  1/20/2014, 1/19/2014            Current Immunizations     13-VALENT PCV PREVNAR 5/28/2017 12:17 PM    Influenza TIV (IM) 1/19/2014 11:47 AM    Pneumococcal polysaccharide vaccine (PPSV-23) 12/15/2015 11:45 AM, 1/9/2014      Below and/or attached are the medications your provider expects you to take. Review all of your home medications and newly ordered medications with your provider and/or pharmacist. Follow medication instructions as directed by your provider and/or pharmacist. Please keep your medication list with you and share with your provider. Update the information when medications are discontinued, doses are changed, or new medications (including over-the-counter products) are added; and carry medication information at all times in the event of emergency situations     Allergies:  CODEINE - (reactions not documented)               Medications  Valid as of: June 29, 2017 -  2:07 PM    Generic Name Brand Name Tablet Size Instructions for use    Adalimumab (Pen-injector Kit) Adalimumab 40 MG/0.8ML Inject 40 mg as instructed every 14 days.        Allopurinol (Tab) ZYLOPRIM 300 MG Take 1 Tab by mouth every day.        Cholestyramine Light (Pack) QUESTRAN,PREVALITE 4 GM Take 1 Packet by mouth 2 Times a Day for 26 days.        Ergocalciferol (Cap) DRISDOL 74582 UNIT Take 1 cap once weekly for 12 weeks.        Ergocalciferol (Cap) DRISDOL 91364 UNITS Take 1 Cap by mouth every 7 days for 55 days.        Folic Acid (Tab) FOLVITE 1 MG Take 2 tabs daily.        Gemfibrozil (Tab) LOPID 600 MG Take 600 mg by mouth 2 times a day.        Hydroxychloroquine Sulfate (Tab) PLAQUENIL 200 MG Take 1 Tab by mouth 2 times a day.        Lisinopril (Tab) PRINIVIL 20 MG Take 20 mg by mouth every day.        Methotrexate Sodium   Take 1 Tab by mouth every day.        Omeprazole (CAPSULE DELAYED RELEASE) PRILOSEC 20 MG Take 1 Cap by mouth every day.        Oxybutynin Chloride (Tab) DITROPAN 5 MG Take 5 mg by mouth 2 Times a Day.         PredniSONE (Tab) DELTASONE 5 MG Take 15 mg by mouth every day.        PredniSONE (Tab) DELTASONE 5 MG Take 3 Tabs by mouth every day.        Sucralfate (Suspension) CARAFATE 1 GM/10ML Take 10 mL by mouth every 6 hours.        .                 Medicines prescribed today were sent to:     [facility] - [place], [place] - [address]    [address] [facility] [address]    Phone: [phone] Fax: [phone]    Open 24 Hours?: No      Medication refill instructions:       If your prescription bottle indicates you have medication refills left, it is not necessary to call your provider’s office. Please contact your pharmacy and they will refill your medication.    If your prescription bottle indicates you do not have any refills left, you may request refills at any time through one of the following ways: The online [email or web address] system (except Urgent Care), by calling your provider’s office, or by asking your pharmacy to contact your provider’s office with a refill request. Medication refills are processed only during regular business hours and may not be available until the next business day. Your provider may request additional information or to have a follow-up visit with you prior to refilling your medication.   *Please Note: Medication refills are assigned a new Rx number when refilled electronically. Your pharmacy may indicate that no refills were authorized even though a new prescription for the same medication is available at the pharmacy. Please request the medicine by name with the pharmacy before contacting your provider for a refill.           [email or web address] Access Code: [email or web address]  Expires: 7/5/2017 11:10 AM    [email or web address]  A secure, online tool to manage your health information     [employer]® is a secure, online tool that connects you to your personalized health information from the privacy of your home -- day or night - making it very easy for you to manage your  healthcare. Once the activation process is completed, you can even access your medical information using the CellScope aniket, which is available for free in the Apple Aniket store or Google Play store.     CellScope provides the following levels of access (as shown below):   My Chart Features   [facility] Doctor [facility]  Specialists [facility]  Urgent  Care Non-[facility]  Primary Care  Doctor   Email your healthcare team securely and privately 24/7 X X X    Manage appointments: schedule your next appointment; view details of past/upcoming appointments X      Request prescription refills. X      View recent personal medical records, including lab and immunizations X X X X   View health record, including health history, allergies, medications X X X X   Read reports about your outpatient visits, procedures, consult and ER notes X X X X   See your discharge summary, which is a recap of your hospital and/or ER visit that includes your diagnosis, lab results, and care plan. X X       How to register for CellScope:  1. Go to  [email or web address].  2. Click on the Sign Up Now box, which takes you to the New Member Sign Up page. You will need to provide the following information:  a. Enter your CellScope Access Code exactly as it appears at the top of this page. (You will not need to use this code after you’ve completed the sign-up process. If you do not sign up before the expiration date, you must request a new code.)   b. Enter your date of birth.   c. Enter your home email address.   d. Click Submit, and follow the next screen’s instructions.  3. Create a CellScope ID. This will be your CellScope login ID and cannot be changed, so think of one that is secure and easy to remember.  4. Create a CellScope password. You can change your password at any time.  5. Enter your Password Reset Question and Answer. This can be used at a later time if you forget your password.   6. Enter your e-mail address. This allows you to receive e-mail  notifications when new information is available in [employer].  7. Click Sign Up. You can now view your health information.    For assistance activating your [employer] account, call [phone]

## 2017-06-29 NOTE — PROGRESS NOTES
Subjective:   Date of Consultation:6/29/2017  1:29 PM  Primary care physician:Jasmyne oSto M.D.    Reason for Consultation:  Ms. Leggett  is a pleasant 50 y.o. year old female who presents for follow-up of Chronic inflammatory arthritis, chronic tophaceous gout    Recent Hospitalization  She was hospitalized for septic shock and RENEE. She was C diff positive.    History of recurrent pancreatitis  This most recent episode (Oct 2016) would be her 6th episode.  Episodes of pancreatitis started in 2007 when she was drinking. No further episodes    Chronic inflammatory arthritis  She reports increase pain.     Current Medications:  Humira 40 mg po q 2 weeks  (q Wednesday) she is currently off due to refill issues  Sulfasalazine was stopped due to elevated LFT  Plaquenil 200 mg BID  She had tapered down to 15 mg po q day of prednisone  She is off methotrexate (secondary to elevated LFT) stopped in December    RHEUM HISTORY  She first presented to see Dr. Patino 9/15/2015 with right wrist and hand pain with swelling and erythema.   However she was initially diagnosed in 2004 with bursitis and 2009 with a diagnosis of lupus.  She presented to Dr. Lawler's practice and was diagnosed with seronegative Rheumatoid Arthritis.  Historically it seems she was responsive only to high dose steroids.  She was previously on Enbrel with poor response she started getting sick.  She was then placed on CImzia and had good response however insurance would not cover the medication, thus she was started on Humira.  Her care has been complicated with sepsis and elevated LFT for which MTX was initially stopped then sulfasalazine.    She does report a history of lupus and received a diagnosis in 2009.  According to notes from 10/6/2015 her manifestations include intermittent oral ulcers, pleuritic chest pain, photosensitivity.   Her history has been noted     Hand xray 9/15/2015 shows no erosions but soft tissue swelling related to OA vs  inflammatory arthritis    Elevated LFT  She was followed with Dr. Boone and felt she had fatty liver.    Chronic Tophaceous gout   No gout flares since last visit.  Last uric acid was 4.9 on  7/22/2016  Off colchicine - this was stopped during her hospitalization   She has not had any problems or flares.    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 Soto    Osteopenia  On alendronate  No fractures since last visit    Hematuria  She reports she notes blood tinge hematuria.  She has seen urology at [facility] and her evaluation felt it was 2/2 methotrexate    DDD, cervical  She notes increased pain. In the past she has been followed by neurosurgery    Vitamin D deficiency  Patient state she is taking calcium and vitamin D  25 hydroxy vitamin D in 7/2016 was 29    Chronic steroid use  She was told she had adrenal insufficiency at her October hospitalization  She is again on 15 mg po q day       Dr Jay for liver problems  Past Medical History   Diagnosis Date   • Lupus (CMS-HCC)    • Fibroids    • SLE (systemic lupus erythematosus) (CMS-HCC)    • Psoriatic arthritis (CMS-HCC)    • Pancreatitis    • Arthritis      Past Surgical History   Procedure Laterality Date   • Gyn surgery  8/2013     Hysteroscopy, D and C   • Colonoscopy N/A 5/31/2017     Procedure: COLONOSCOPY;  Surgeon: Enrique Canseco M.D.;  Location: SURGERY [facility];  Service:    • Gastroscopy with biopsy N/A 5/31/2017     Procedure: GASTROSCOPY WITH BIOPSY;  Surgeon: Enrique Canseco M.D.;  Location: SURGERY [facility];  Service:      Allergies   Allergen Reactions   • Codeine      Outpatient Encounter Prescriptions as of 6/29/2017   Medication Sig Dispense Refill   • predniSONE (DELTASONE) 5 MG Tab Take 3 Tabs by mouth every day. 30 Tab 0   •  omeprazole (PRILOSEC) 20 MG delayed-release capsule Take 1 Cap by mouth every day. 30 Cap 3   • lisinopril (PRINIVIL) 20 MG Tab Take 20 mg by mouth every day.     • allopurinol (ZYLOPRIM) 300 MG Tab Take 1 Tab by mouth every day. 30 Tab 0   • hydroxychloroquine (PLAQUENIL) 200 MG Tab Take 1 Tab by mouth 2 times a day. 60 Tab 1   • folic acid (FOLVITE) 1 MG Tab Take 2 tabs daily. 60 Tab 11   • ergocalciferol (DRISDOL) 37908 UNIT capsule Take 1 cap once weekly for 12 weeks. 12 Cap 0   • gemfibrozil (LOPID) 600 MG Tab Take 600 mg by mouth 2 times a day.     • predniSONE (DELTASONE) 5 MG Tab Take 15 mg by mouth every day.     • oxybutynin (DITROPAN) 5 MG TABS Take 5 mg by mouth 2 Times a Day.     • cholestyramine (QUESTRAN,PREVALITE) 4 GM Pack Take 1 Packet by mouth 2 Times a Day for 26 days. 52 Packet 3   • sucralfate (CARAFATE) 1 GM/10ML Suspension Take 10 mL by mouth every 6 hours. 30 mL 0   • vitamin D, Ergocalciferol, (DRISDOL) 81852 UNITS Cap capsule Take 1 Cap by mouth every 7 days for 55 days. 7 Cap 0   • Adalimumab 40 MG/0.8ML Pen-injector Kit Inject 40 mg as instructed every 14 days.     • Methotrexate Sodium (METHOTREXATE PO) Take 1 Tab by mouth every day.       No facility-administered encounter medications on file as of 2017.     Social History     Social History   • Marital Status:      Spouse Name: N/A   • Number of Children: N/A   • Years of Education: N/A     Occupational History   • Not on file.     Social History Main Topics   • Smoking status: Never Smoker    • Smokeless tobacco: Not on file   • Alcohol Use: No   • Drug Use: No   • Sexual Activity: Not on file     Other Topics Concern   • Not on file     Social History Narrative      History   Smoking status   • Never Smoker    Smokeless tobacco   • Not on file     History   Alcohol Use No     History   Drug Use No      OB History    Para Term  AB SAB TAB Ectopic Multiple Living   0 0                    No LMP recorded.  Patient is postmenopausal.    G P A L     Family History   Problem Relation Age of Onset   • Diabetes Mother    • Hypertension Mother    • Cancer Mother    • Diabetes Father    • Cancer Father        Review of Systems   Constitutional: Positive for malaise/fatigue. Negative for fever and chills.   HENT:        No oral ulcers or dry mouth   Eyes: Negative for double vision, photophobia and pain.   Respiratory: Negative for cough and hemoptysis.    Cardiovascular: Negative for chest pain and palpitations.   Gastrointestinal: Negative for constipation and blood in stool.   Musculoskeletal: Positive for back pain, joint pain and neck pain.   Skin:        No malar rash or photosensitive rash   Neurological: Negative for headaches.        Objective:   /60 mmHg  Pulse 132  Temp(Src) 36.7 °C (98.1 °F)  Resp 12  Wt 76.658 kg (169 lb)  SpO2 100%  Breastfeeding? No   reheck HR was 140  BP 98/62 left arm    Physical Exam   Constitutional: She is oriented to person, place, and time. She appears well-developed and well-nourished. No distress.   HENT:   Head: Normocephalic.   Right Ear: External ear normal.   Left Ear: External ear normal.   Mouth/Throat: Oropharynx is clear and moist.   Eyes: Conjunctivae and EOM are normal. Pupils are equal, round, and reactive to light. Right eye exhibits no discharge. Left eye exhibits no discharge.   Neck: No JVD present.   Pulmonary/Chest: Effort normal and breath sounds normal. No stridor. No respiratory distress.   Musculoskeletal: She exhibits no edema.   Gout tophi on the right elbow.  Mild ulnar deviation but not periarticular swelling in the MCP or PIP.  No swelling of the wrists.   Neurological: She is alert and oriented to person, place, and time.   Skin: Skin is warm. She is not diaphoretic. No erythema.   Still a small nodule on the left and a bursa on the right.   Psychiatric: She has a normal mood and affect. Her behavior is normal. Thought content normal.        Assessment:     1. Tachycardia  CBC WITH DIFFERENTIAL    COMP METABOLIC PANEL    WESTERGREN SED RATE    CRP QUANTITIVE (NON-CARDIAC)    DSDNA IGG TITER    COMPLEMENT C4    ANTI-DNA (DS)    COMPLEMENT C3    URINALYSIS   2. Systemic lupus erythematosus, unspecified SLE type, unspecified organ involvement status (CMS-HCC)  CBC WITH DIFFERENTIAL    COMP METABOLIC PANEL    WESTERGREN SED RATE    CRP QUANTITIVE (NON-CARDIAC)    DSDNA IGG TITER    COMPLEMENT C4    ANTI-DNA (DS)    COMPLEMENT C3    URINALYSIS   3. Chronic tophaceous gout  CBC WITH DIFFERENTIAL    COMP METABOLIC PANEL    WESTERGREN SED RATE    CRP QUANTITIVE (NON-CARDIAC)    DSDNA IGG TITER    COMPLEMENT C4    ANTI-DNA (DS)    COMPLEMENT C3    URINALYSIS   4. Psoriatic arthritis (CMS-HCC)  CBC WITH DIFFERENTIAL    COMP METABOLIC PANEL    WESTERGREN SED RATE    CRP QUANTITIVE (NON-CARDIAC)    DSDNA IGG TITER    COMPLEMENT C4    ANTI-DNA (DS)    COMPLEMENT C3    URINALYSIS   5. Inflammatory arthritis  CBC WITH DIFFERENTIAL    COMP METABOLIC PANEL    WESTERGREN SED RATE    CRP QUANTITIVE (NON-CARDIAC)    DSDNA IGG TITER    COMPLEMENT C4    ANTI-DNA (DS)    COMPLEMENT C3    URINALYSIS     Labs:    Lab Results   Component Value Date/Time    QUANTIFERON TB GOLD Negative 12/30/2015 12:27 PM     Lab Results   Component Value Date/Time    HEPATITIS B CCORE AB, TOTAL Negative 10/21/2015 08:47 AM    HEPATITIS B SURFACE ANTIGEN Negative 10/21/2015 08:47 AM     Lab Results   Component Value Date/Time    HEPATITIS C ANTIBODY Negative 10/21/2015 08:47 AM     Lab Results   Component Value Date/Time    SODIUM 140 06/04/2017 04:51 AM    POTASSIUM 4.2 06/04/2017 04:51 AM    CHLORIDE 111 06/04/2017 04:51 AM    CO2 22 06/04/2017 04:51 AM    GLUCOSE 91 06/04/2017 04:51 AM    BUN 7* 06/04/2017 04:51 AM    CREATININE 0.60 06/04/2017 04:51 AM      Lab Results   Component Value Date/Time    WBC 6.3 06/05/2017 04:35 AM    RBC 2.78* 06/05/2017 04:35 AM    HEMOGLOBIN 8.0*  06/05/2017 04:35 AM    HEMATOCRIT 26.0* 06/05/2017 04:35 AM    MCV 93.5 06/05/2017 04:35 AM    MCH 28.8 06/05/2017 04:35 AM    MCHC 30.8* 06/05/2017 04:35 AM    MPV 9.7 06/05/2017 04:35 AM    NEUTROPHILS-POLYS 68.10 06/04/2017 04:51 AM    LYMPHOCYTES 23.90 06/04/2017 04:51 AM    MONOCYTES 4.40 06/04/2017 04:51 AM    EOSINOPHILS 1.80 06/04/2017 04:51 AM    BASOPHILS 0.90 06/04/2017 04:51 AM    HYPOCHROMIA 1+ 01/22/2014 02:20 AM    ANISOCYTOSIS 1+ 06/04/2017 04:51 AM      Lab Results   Component Value Date/Time    CALCIUM 8.4* 06/04/2017 04:51 AM    AST(SGOT) 31 06/04/2017 04:51 AM    ALT(SGPT) 46 06/04/2017 04:51 AM    ALKALINE PHOSPHATASE 178* 06/04/2017 04:51 AM    TOTAL BILIRUBIN 0.3 06/04/2017 04:51 AM    ALBUMIN 2.8* 06/04/2017 04:51 AM    TOTAL PROTEIN 5.1* 06/04/2017 04:51 AM     Lab Results   Component Value Date/Time    URIC ACID 4.5 11/30/2016 11:25 AM    RHEUMATOID FACTOR -NEPH- <10 07/22/2016 04:41 PM    CCP ANTIBODIES 2 06/22/2015 11:24 AM    ANTINUCLEAR ANTIBODY None Detected 07/22/2016 04:41 PM     Lab Results   Component Value Date/Time    SED RATE WESTERGREN 16 11/30/2016 11:25 AM    STAT C-REACTIVE PROTEIN 0.20 11/30/2016 11:25 AM     Lab Results   Component Value Date/Time    DILUTE BLANCO VIPER VENOM ALIREZA 46.8 02/10/2016 04:27 PM    LUPUS ANTICOAGULANT Not Present 02/10/2016 04:27 PM     Lab Results   Component Value Date/Time    C3 COMPLEMENT 214.0* 11/30/2016 11:25 AM    COMPLEMENT C4 39.0 11/30/2016 11:25 AM    ANTI-CARIOLIPIN AB IGG 1 02/10/2016 04:27 PM    ANTI-CARDIOLIPIN AB IGM 0 02/10/2016 04:27 PM    ANTI-CARDIOLIPIN AB IGA 1 02/10/2016 04:27 PM     Lab Results   Component Value Date/Time    ANTI-DNA -DS None Detected 11/30/2016 11:25 AM     Lab Results   Component Value Date/Time    ANTI-DNA -DS None Detected 11/30/2016 11:25 AM    ANCA IGG <1:20 02/10/2016 04:27 PM    C3 COMPLEMENT 214.0* 11/30/2016 11:25 AM     Lab Results   Component Value Date/Time    COLOR Straw 05/27/2017 06:26 AM     SPECIFIC GRAVITY 1.005 05/27/2017 06:26 AM    PH 5.0 05/27/2017 06:26 AM    GLUCOSE Trace* 05/27/2017 06:26 AM    KETONES Negative 05/27/2017 06:26 AM    PROTEIN Negative 05/27/2017 06:26 AM     No results found for: TOTPROTUR, TOTALVOLUME, [id number]  No results found for: SSA60, SSA52  Lab Results   Component Value Date/Time    GLYCOHEMOGLOBIN 6.5* 09/21/2015 09:34 AM     Lab Results   Component Value Date/Time    CPK TOTAL 145 05/27/2017 09:49 AM     Lab Results   Component Value Date/Time    G-6-PD 11.0 12/14/2015 12:40 PM     No results found for: [id number]  No results found for: ACESERUM  Lab Results   Component Value Date/Time    25-HYDROXY   VITAMIN D 25 8* 06/01/2017 07:11 AM     No results found for: TSH, FREEDIR  Lab Results   Component Value Date/Time    TSH 1.310 07/22/2016 04:41 PM    FREE T-4 0.99 09/21/2015 09:34 AM     No results found for: MICROSOMALA, ANTITHYROGL  No results found for: IGGLYMEABS  Lab Results   Component Value Date/Time    F-ACTIN AB, IGG 6 06/03/2017 08:05 AM     Lab Results   Component Value Date/Time    IMMUNOGLOBULIN A 259 06/03/2017 02:54 AM    T-TG IGA 1 06/03/2017 02:54 AM     Lab Results   Component Value Date/Time    FLUID TYPE comment 09/15/2015 02:59 PM    CRYSTALS, BODY FLUID Many 09/15/2015 02:59 PM     No results found for: ISTATICAL  No results found for: ISTATCREAT  No results found for: CTELOPEP  No results found for: GBMABG  Lab Results   Component Value Date/Time    PTH, INTACT 105.3* 06/01/2017 07:11 AM           Medical Decision Making:  Today's Assessment / Status / Plan:   Tachycardia  Note that she was found to be tachycardic as high as 140 bpm. Her blood pressure was 98/62 on the left arm. Patient is asymptomatic. However for tachycardia and asked her to return back to the emergency room for further evaluation. I did note that she was anemic upon discharge however her vitals at discharge showed that her heart rate was 80 and blood pressure was in the  150s for systolic and 64 for diastolic blood pressure.    Chronic inflammatory arthritis  She is currently on prednisone 15 mg and Humira.  She has been off Humira for a few weeks.  She is not on sulfasalazine.  She is not on methotrexate.  I would first like her to restart the Humira.  I did advise the patient not to restart her medications of she is ill.    History of Lupus  No active signs or symptoms.  Will recheck complement and anti dsDNA and evaluate for cytopenias at next appointment  Continue plaquenil 200 mg BID    Elevated LFT and hepatomegaly on exam  She has a follow-up GI appointment  She also had an abnormal ultrasound    History of Hematuria  Etiology was attributed to methotrexate use.  Will recheck at next visit.    Osteopenia and vitamin D deficiency  Continue fosamax   Continue vitamin D and calcium supplement    Chronic tophaceous gout  Continue allopurinol   Uric acid  11/30/2016 was 4.5 - doing well goal is 6.0       1. Tachycardia  CBC WITH DIFFERENTIAL    COMP METABOLIC PANEL    WESTERGREN SED RATE    CRP QUANTITIVE (NON-CARDIAC)    DSDNA IGG TITER    COMPLEMENT C4    ANTI-DNA (DS)    COMPLEMENT C3    URINALYSIS   2. Systemic lupus erythematosus, unspecified SLE type, unspecified organ involvement status (CMS-[facility])  CBC WITH DIFFERENTIAL    COMP METABOLIC PANEL    WESTERGREN SED RATE    CRP QUANTITIVE (NON-CARDIAC)    DSDNA IGG TITER    COMPLEMENT C4    ANTI-DNA (DS)    COMPLEMENT C3    URINALYSIS   3. Chronic tophaceous gout  CBC WITH DIFFERENTIAL    COMP METABOLIC PANEL    WESTERGREN SED RATE    CRP QUANTITIVE (NON-CARDIAC)    DSDNA IGG TITER    COMPLEMENT C4    ANTI-DNA (DS)    COMPLEMENT C3    URINALYSIS   4. Psoriatic arthritis (CMS-[facility])  CBC WITH DIFFERENTIAL    COMP METABOLIC PANEL    WESTERGREN SED RATE    CRP QUANTITIVE (NON-CARDIAC)    DSDNA IGG TITER    COMPLEMENT C4    ANTI-DNA (DS)    COMPLEMENT C3    URINALYSIS   5. Inflammatory arthritis  CBC WITH DIFFERENTIAL    COMP METABOLIC  PANEL    WESTERGREN SED RATE    CRP QUANTITIVE (NON-CARDIAC)    DSDNA IGG TITER    COMPLEMENT C4    ANTI-DNA (DS)    COMPLEMENT C3    URINALYSIS           Return in about 3 months (around 9/29/2017).     I have spent greater than 50% of this 30 minute visit in counseling/coordination of care with the patient regarding  Her tachycardia and contacting the ED as well as discussing the next steps.      She agreed and verbalized her agreement and understanding with the current plan. I answered all questions and concerns she has at this time and advised her to call at any time in there interim with questions or concerns in regards to her care.    Thank you for allowing me to participate in her care, I will continue to follow closely.

## 2017-07-18 DIAGNOSIS — M19.90 CHRONIC INFLAMMATORY ARTHRITIS: ICD-10-CM

## 2017-07-18 RX ORDER — PREDNISONE 5 MG/1
TABLET ORAL
Qty: 75 TAB | Refills: 1 | Status: SHIPPED | OUTPATIENT
Start: 2017-07-18 | End: 2017-08-04

## 2017-07-18 RX ORDER — HYDROXYCHLOROQUINE SULFATE 200 MG/1
200 TABLET, FILM COATED ORAL 2 TIMES DAILY
Qty: 60 TAB | Refills: 2 | Status: SHIPPED | OUTPATIENT
Start: 2017-07-18

## 2017-07-18 RX ORDER — FOLIC ACID 1 MG/1
TABLET ORAL
Qty: 60 TAB | Refills: 11 | OUTPATIENT
Start: 2017-07-18

## 2017-07-18 NOTE — TELEPHONE ENCOUNTER
Was the patient seen in the last year in this department? Yes     Does patient have an active prescription for medications requested? No     Received Request Via: Pharmacy       Pharmacy is also requesting Sulfasalazine 500MG and Colchicine 0.6MG thank you!

## 2017-07-18 NOTE — TELEPHONE ENCOUNTER
Not on methotrexate so we don't need folic acid.  Will recheck LFT and CBC before restarting sulfasalazine

## 2017-08-01 ENCOUNTER — TELEPHONE (OUTPATIENT)
Dept: RHEUMATOLOGY | Facility: PHYSICIAN GROUP | Age: 52
End: 2017-08-01

## 2017-08-01 DIAGNOSIS — M10.9 GOUT, UNSPECIFIED CAUSE, UNSPECIFIED CHRONICITY, UNSPECIFIED SITE: ICD-10-CM

## 2017-08-01 RX ORDER — COLCHICINE 0.6 MG/1
TABLET ORAL
Qty: 6 TAB | Refills: 0 | Status: SHIPPED | OUTPATIENT
Start: 2017-08-01 | End: 2017-08-04

## 2017-08-03 ENCOUNTER — HOSPITAL ENCOUNTER (OUTPATIENT)
Dept: LAB | Facility: MEDICAL CENTER | Age: 52
End: 2017-08-03
Attending: INTERNAL MEDICINE
Payer: MEDICAID

## 2017-08-03 DIAGNOSIS — L40.50 PSORIATIC ARTHRITIS (HCC): ICD-10-CM

## 2017-08-03 DIAGNOSIS — M1A.9XX1 CHRONIC TOPHACEOUS GOUT: ICD-10-CM

## 2017-08-03 DIAGNOSIS — M32.9 SYSTEMIC LUPUS ERYTHEMATOSUS, UNSPECIFIED SLE TYPE, UNSPECIFIED ORGAN INVOLVEMENT STATUS (HCC): Chronic | ICD-10-CM

## 2017-08-03 DIAGNOSIS — R00.0 TACHYCARDIA: ICD-10-CM

## 2017-08-03 DIAGNOSIS — M19.90 INFLAMMATORY ARTHRITIS: ICD-10-CM

## 2017-08-03 LAB
ALBUMIN SERPL BCP-MCNC: 3.7 G/DL (ref 3.2–4.9)
ALBUMIN/GLOB SERPL: 1.4 G/DL
ALP SERPL-CCNC: 235 U/L (ref 30–99)
ALT SERPL-CCNC: 24 U/L (ref 2–50)
ANION GAP SERPL CALC-SCNC: 22 MMOL/L (ref 0–11.9)
APPEARANCE UR: ABNORMAL
AST SERPL-CCNC: 70 U/L (ref 12–45)
BACTERIA #/AREA URNS HPF: ABNORMAL /HPF
BASOPHILS # BLD AUTO: 0.9 % (ref 0–1.8)
BASOPHILS # BLD: 0.07 K/UL (ref 0–0.12)
BILIRUB SERPL-MCNC: 1.7 MG/DL (ref 0.1–1.5)
BILIRUB UR QL STRIP.AUTO: NEGATIVE
BUN SERPL-MCNC: 29 MG/DL (ref 8–22)
C3 SERPL-MCNC: 172 MG/DL (ref 87–200)
C4 SERPL-MCNC: 38 MG/DL (ref 19–52)
CALCIUM SERPL-MCNC: 8.8 MG/DL (ref 8.5–10.5)
CAOX CRY #/AREA URNS HPF: ABNORMAL /HPF
CHLORIDE SERPL-SCNC: 89 MMOL/L (ref 96–112)
CO2 SERPL-SCNC: 14 MMOL/L (ref 20–33)
COLOR UR: ABNORMAL
CREAT SERPL-MCNC: 3.79 MG/DL (ref 0.5–1.4)
CRP SERPL HS-MCNC: 0.42 MG/DL (ref 0–0.75)
EOSINOPHIL # BLD AUTO: 0.05 K/UL (ref 0–0.51)
EOSINOPHIL NFR BLD: 0.6 % (ref 0–6.9)
EPI CELLS #/AREA URNS HPF: ABNORMAL /HPF
ERYTHROCYTE [DISTWIDTH] IN BLOOD BY AUTOMATED COUNT: 61.1 FL (ref 35.9–50)
ERYTHROCYTE [SEDIMENTATION RATE] IN BLOOD BY WESTERGREN METHOD: 55 MM/HOUR (ref 0–20)
GFR SERPL CREATININE-BSD FRML MDRD: 13 ML/MIN/1.73 M 2
GLOBULIN SER CALC-MCNC: 2.7 G/DL (ref 1.9–3.5)
GLUCOSE SERPL-MCNC: 110 MG/DL (ref 65–99)
GLUCOSE UR STRIP.AUTO-MCNC: NEGATIVE MG/DL
HCT VFR BLD AUTO: 27.1 % (ref 37–47)
HGB BLD-MCNC: 8.4 G/DL (ref 12–16)
HYALINE CASTS #/AREA URNS LPF: ABNORMAL /LPF
IMM GRANULOCYTES # BLD AUTO: 0.3 K/UL (ref 0–0.11)
IMM GRANULOCYTES NFR BLD AUTO: 3.7 % (ref 0–0.9)
KETONES UR STRIP.AUTO-MCNC: ABNORMAL MG/DL
LEUKOCYTE ESTERASE UR QL STRIP.AUTO: ABNORMAL
LYMPHOCYTES # BLD AUTO: 2.92 K/UL (ref 1–4.8)
LYMPHOCYTES NFR BLD: 36.2 % (ref 22–41)
MCH RBC QN AUTO: 29.5 PG (ref 27–33)
MCHC RBC AUTO-ENTMCNC: 31 G/DL (ref 33.6–35)
MCV RBC AUTO: 95.1 FL (ref 81.4–97.8)
MICRO URNS: ABNORMAL
MONOCYTES # BLD AUTO: 1.28 K/UL (ref 0–0.85)
MONOCYTES NFR BLD AUTO: 15.9 % (ref 0–13.4)
NEUTROPHILS # BLD AUTO: 3.45 K/UL (ref 2–7.15)
NEUTROPHILS NFR BLD: 42.7 % (ref 44–72)
NITRITE UR QL STRIP.AUTO: NEGATIVE
NRBC # BLD AUTO: 0.03 K/UL
NRBC BLD AUTO-RTO: 0.4 /100 WBC
PH UR STRIP.AUTO: 5.5 [PH]
PLATELET # BLD AUTO: 311 K/UL (ref 164–446)
PMV BLD AUTO: 10.2 FL (ref 9–12.9)
POTASSIUM SERPL-SCNC: 2.7 MMOL/L (ref 3.6–5.5)
PROT SERPL-MCNC: 6.4 G/DL (ref 6–8.2)
PROT UR QL STRIP: 100 MG/DL
RBC # BLD AUTO: 2.85 M/UL (ref 4.2–5.4)
RBC # URNS HPF: ABNORMAL /HPF
RBC UR QL AUTO: NEGATIVE
SODIUM SERPL-SCNC: 125 MMOL/L (ref 135–145)
SP GR UR STRIP.AUTO: 1.02
UROBILINOGEN UR STRIP.AUTO-MCNC: 1 MG/DL
WBC # BLD AUTO: 8.1 K/UL (ref 4.8–10.8)
WBC #/AREA URNS HPF: ABNORMAL /HPF

## 2017-08-03 PROCEDURE — 83516 IMMUNOASSAY NONANTIBODY: CPT

## 2017-08-03 PROCEDURE — 82784 ASSAY IGA/IGD/IGG/IGM EACH: CPT

## 2017-08-04 ENCOUNTER — TELEPHONE (OUTPATIENT)
Dept: RHEUMATOLOGY | Facility: PHYSICIAN GROUP | Age: 52
End: 2017-08-04

## 2017-08-04 ENCOUNTER — OFFICE VISIT (OUTPATIENT)
Dept: URGENT CARE | Facility: PHYSICIAN GROUP | Age: 52
End: 2017-08-04
Payer: MEDICAID

## 2017-08-04 ENCOUNTER — HOSPITAL ENCOUNTER (INPATIENT)
Facility: MEDICAL CENTER | Age: 52
LOS: 5 days | DRG: 682 | End: 2017-08-09
Attending: EMERGENCY MEDICINE | Admitting: INTERNAL MEDICINE
Payer: MEDICAID

## 2017-08-04 ENCOUNTER — RESOLUTE PROFESSIONAL BILLING HOSPITAL PROF FEE (OUTPATIENT)
Dept: HOSPITALIST | Facility: MEDICAL CENTER | Age: 52
End: 2017-08-04
Payer: MEDICAID

## 2017-08-04 ENCOUNTER — APPOINTMENT (OUTPATIENT)
Dept: RADIOLOGY | Facility: MEDICAL CENTER | Age: 52
DRG: 682 | End: 2017-08-04
Attending: EMERGENCY MEDICINE
Payer: MEDICAID

## 2017-08-04 VITALS
BODY MASS INDEX: 29.59 KG/M2 | DIASTOLIC BLOOD PRESSURE: 58 MMHG | WEIGHT: 167 LBS | RESPIRATION RATE: 16 BRPM | TEMPERATURE: 97.5 F | HEIGHT: 63 IN | SYSTOLIC BLOOD PRESSURE: 86 MMHG | OXYGEN SATURATION: 99 % | HEART RATE: 127 BPM

## 2017-08-04 DIAGNOSIS — E87.6 HYPOKALEMIA: ICD-10-CM

## 2017-08-04 DIAGNOSIS — Z86.19 H/O SEPTIC SHOCK: ICD-10-CM

## 2017-08-04 DIAGNOSIS — I95.9 HYPOTENSION, UNSPECIFIED: ICD-10-CM

## 2017-08-04 DIAGNOSIS — D64.9 ANEMIA, UNSPECIFIED TYPE: ICD-10-CM

## 2017-08-04 DIAGNOSIS — N28.9 RENAL DYSFUNCTION: ICD-10-CM

## 2017-08-04 DIAGNOSIS — R00.0 TACHYCARDIA: ICD-10-CM

## 2017-08-04 DIAGNOSIS — E87.8 HYPOCHLOREMIA: ICD-10-CM

## 2017-08-04 DIAGNOSIS — E87.1 HYPONATREMIA: ICD-10-CM

## 2017-08-04 PROBLEM — A41.9 SEPSIS, UNSPECIFIED: Status: ACTIVE | Noted: 2017-08-04

## 2017-08-04 PROBLEM — D84.9 IMMUNOSUPPRESSED STATUS (HCC): Status: ACTIVE | Noted: 2017-08-04

## 2017-08-04 PROBLEM — N17.9 ARF (ACUTE RENAL FAILURE) (HCC): Status: ACTIVE | Noted: 2017-08-04

## 2017-08-04 LAB
ALBUMIN SERPL BCP-MCNC: 3.2 G/DL (ref 3.2–4.9)
ALBUMIN/GLOB SERPL: 1.2 G/DL
ALP SERPL-CCNC: 228 U/L (ref 30–99)
ALT SERPL-CCNC: 22 U/L (ref 2–50)
ANION GAP SERPL CALC-SCNC: 19 MMOL/L (ref 0–11.9)
ANISOCYTOSIS BLD QL SMEAR: ABNORMAL
APPEARANCE UR: ABNORMAL
APPEARANCE UR: CLEAR
AST SERPL-CCNC: 72 U/L (ref 12–45)
BACTERIA #/AREA URNS HPF: ABNORMAL /HPF
BACTERIA #/AREA URNS HPF: ABNORMAL /HPF
BASOPHILS # BLD AUTO: 0.9 % (ref 0–1.8)
BASOPHILS # BLD: 0.04 K/UL (ref 0–0.12)
BILIRUB SERPL-MCNC: 1.6 MG/DL (ref 0.1–1.5)
BILIRUB UR QL STRIP.AUTO: NEGATIVE
BILIRUB UR QL STRIP.AUTO: NEGATIVE
BUN SERPL-MCNC: 28 MG/DL (ref 8–22)
CALCIUM SERPL-MCNC: 8.3 MG/DL (ref 8.5–10.5)
CHLORIDE SERPL-SCNC: 94 MMOL/L (ref 96–112)
CO2 SERPL-SCNC: 16 MMOL/L (ref 20–33)
COLOR UR: YELLOW
COLOR UR: YELLOW
CORTIS SERPL-MCNC: 11.8 UG/DL (ref 0–23)
CREAT SERPL-MCNC: 3.14 MG/DL (ref 0.5–1.4)
CULTURE IF INDICATED INDCX: NO UA CULTURE
EKG IMPRESSION: NORMAL
EOSINOPHIL # BLD AUTO: 0 K/UL (ref 0–0.51)
EOSINOPHIL NFR BLD: 0 % (ref 0–6.9)
EPI CELLS #/AREA URNS HPF: ABNORMAL /HPF
EPI CELLS #/AREA URNS HPF: ABNORMAL /HPF
ERYTHROCYTE [DISTWIDTH] IN BLOOD BY AUTOMATED COUNT: 61 FL (ref 35.9–50)
ERYTHROCYTE [SEDIMENTATION RATE] IN BLOOD BY WESTERGREN METHOD: 39 MM/HOUR (ref 0–20)
GFR SERPL CREATININE-BSD FRML MDRD: 16 ML/MIN/1.73 M 2
GLOBULIN SER CALC-MCNC: 2.7 G/DL (ref 1.9–3.5)
GLUCOSE SERPL-MCNC: 119 MG/DL (ref 65–99)
GLUCOSE UR STRIP.AUTO-MCNC: 500 MG/DL
GLUCOSE UR STRIP.AUTO-MCNC: ABNORMAL MG/DL
HCT VFR BLD AUTO: 23.5 % (ref 37–47)
HGB BLD-MCNC: 7.6 G/DL (ref 12–16)
HYALINE CASTS #/AREA URNS LPF: ABNORMAL /LPF
KETONES UR STRIP.AUTO-MCNC: NEGATIVE MG/DL
KETONES UR STRIP.AUTO-MCNC: NEGATIVE MG/DL
LACTATE BLD-SCNC: 1.8 MMOL/L (ref 0.5–2)
LACTATE BLD-SCNC: 3.5 MMOL/L (ref 0.5–2)
LEUKOCYTE ESTERASE UR QL STRIP.AUTO: ABNORMAL
LEUKOCYTE ESTERASE UR QL STRIP.AUTO: NEGATIVE
LG PLATELETS BLD QL SMEAR: NORMAL
LIPASE SERPL-CCNC: 54 U/L (ref 11–82)
LYMPHOCYTES # BLD AUTO: 1.04 K/UL (ref 1–4.8)
LYMPHOCYTES NFR BLD: 22.1 % (ref 22–41)
MACROCYTES BLD QL SMEAR: ABNORMAL
MAGNESIUM SERPL-MCNC: 1.3 MG/DL (ref 1.5–2.5)
MANUAL DIFF BLD: NORMAL
MCH RBC QN AUTO: 30 PG (ref 27–33)
MCHC RBC AUTO-ENTMCNC: 32.3 G/DL (ref 33.6–35)
MCV RBC AUTO: 92.9 FL (ref 81.4–97.8)
METAMYELOCYTES NFR BLD MANUAL: 1.9 %
MICRO URNS: ABNORMAL
MICRO URNS: ABNORMAL
MICROCYTES BLD QL SMEAR: ABNORMAL
MONOCYTES # BLD AUTO: 0.45 K/UL (ref 0–0.85)
MONOCYTES NFR BLD AUTO: 9.6 % (ref 0–13.4)
MORPHOLOGY BLD-IMP: NORMAL
MUCOUS THREADS #/AREA URNS HPF: ABNORMAL /HPF
MYELOCYTES NFR BLD MANUAL: 1 %
NEUTROPHILS # BLD AUTO: 3.03 K/UL (ref 2–7.15)
NEUTROPHILS NFR BLD: 63.5 % (ref 44–72)
NEUTS BAND NFR BLD MANUAL: 1 % (ref 0–10)
NITRITE UR QL STRIP.AUTO: NEGATIVE
NITRITE UR QL STRIP.AUTO: NEGATIVE
NRBC # BLD AUTO: 0.02 K/UL
NRBC BLD AUTO-RTO: 0.4 /100 WBC
PH UR STRIP.AUTO: 5 [PH]
PH UR STRIP.AUTO: 6 [PH]
PLATELET # BLD AUTO: 244 K/UL (ref 164–446)
PLATELET BLD QL SMEAR: NORMAL
PMV BLD AUTO: 9.8 FL (ref 9–12.9)
POTASSIUM SERPL-SCNC: 2.7 MMOL/L (ref 3.6–5.5)
PROT SERPL-MCNC: 5.9 G/DL (ref 6–8.2)
PROT UR QL STRIP: 30 MG/DL
PROT UR QL STRIP: NEGATIVE MG/DL
RBC # BLD AUTO: 2.53 M/UL (ref 4.2–5.4)
RBC # URNS HPF: ABNORMAL /HPF
RBC # URNS HPF: ABNORMAL /HPF
RBC BLD AUTO: PRESENT
RBC UR QL AUTO: NEGATIVE
RBC UR QL AUTO: NEGATIVE
SMUDGE CELLS BLD QL SMEAR: NORMAL
SODIUM SERPL-SCNC: 129 MMOL/L (ref 135–145)
SP GR UR STRIP.AUTO: 1
SP GR UR STRIP.AUTO: 1
TOXIC GRANULES BLD QL SMEAR: SLIGHT
TRANS CELLS #/AREA URNS HPF: ABNORMAL /HPF
TRANS CELLS #/AREA URNS HPF: ABNORMAL /HPF
UROBILINOGEN UR STRIP.AUTO-MCNC: NORMAL MG/DL
WBC # BLD AUTO: 4.7 K/UL (ref 4.8–10.8)
WBC #/AREA URNS HPF: ABNORMAL /HPF
WBC #/AREA URNS HPF: ABNORMAL /HPF

## 2017-08-04 PROCEDURE — 700111 HCHG RX REV CODE 636 W/ 250 OVERRIDE (IP): Performed by: INTERNAL MEDICINE

## 2017-08-04 PROCEDURE — 71010 DX-CHEST-PORTABLE (1 VIEW): CPT

## 2017-08-04 PROCEDURE — 96365 THER/PROPH/DIAG IV INF INIT: CPT

## 2017-08-04 PROCEDURE — 83735 ASSAY OF MAGNESIUM: CPT

## 2017-08-04 PROCEDURE — 700105 HCHG RX REV CODE 258: Performed by: INTERNAL MEDICINE

## 2017-08-04 PROCEDURE — 94760 N-INVAS EAR/PLS OXIMETRY 1: CPT

## 2017-08-04 PROCEDURE — 700102 HCHG RX REV CODE 250 W/ 637 OVERRIDE(OP): Performed by: INTERNAL MEDICINE

## 2017-08-04 PROCEDURE — 83690 ASSAY OF LIPASE: CPT

## 2017-08-04 PROCEDURE — 96375 TX/PRO/DX INJ NEW DRUG ADDON: CPT

## 2017-08-04 PROCEDURE — 87040 BLOOD CULTURE FOR BACTERIA: CPT

## 2017-08-04 PROCEDURE — 99204 OFFICE O/P NEW MOD 45 MIN: CPT | Performed by: FAMILY MEDICINE

## 2017-08-04 PROCEDURE — 96361 HYDRATE IV INFUSION ADD-ON: CPT

## 2017-08-04 PROCEDURE — 36415 COLL VENOUS BLD VENIPUNCTURE: CPT

## 2017-08-04 PROCEDURE — 99285 EMERGENCY DEPT VISIT HI MDM: CPT

## 2017-08-04 PROCEDURE — 81001 URINALYSIS AUTO W/SCOPE: CPT

## 2017-08-04 PROCEDURE — 770020 HCHG ROOM/CARE - TELE (206)

## 2017-08-04 PROCEDURE — 85652 RBC SED RATE AUTOMATED: CPT

## 2017-08-04 PROCEDURE — 85007 BL SMEAR W/DIFF WBC COUNT: CPT

## 2017-08-04 PROCEDURE — 700105 HCHG RX REV CODE 258: Performed by: EMERGENCY MEDICINE

## 2017-08-04 PROCEDURE — 700102 HCHG RX REV CODE 250 W/ 637 OVERRIDE(OP): Performed by: EMERGENCY MEDICINE

## 2017-08-04 PROCEDURE — 96367 TX/PROPH/DG ADDL SEQ IV INF: CPT

## 2017-08-04 PROCEDURE — 82533 TOTAL CORTISOL: CPT

## 2017-08-04 PROCEDURE — 85027 COMPLETE CBC AUTOMATED: CPT

## 2017-08-04 PROCEDURE — A9270 NON-COVERED ITEM OR SERVICE: HCPCS | Performed by: INTERNAL MEDICINE

## 2017-08-04 PROCEDURE — 96368 THER/DIAG CONCURRENT INF: CPT

## 2017-08-04 PROCEDURE — 99223 1ST HOSP IP/OBS HIGH 75: CPT | Performed by: INTERNAL MEDICINE

## 2017-08-04 PROCEDURE — 93005 ELECTROCARDIOGRAM TRACING: CPT | Performed by: EMERGENCY MEDICINE

## 2017-08-04 PROCEDURE — 83605 ASSAY OF LACTIC ACID: CPT

## 2017-08-04 PROCEDURE — 80053 COMPREHEN METABOLIC PANEL: CPT

## 2017-08-04 PROCEDURE — A9270 NON-COVERED ITEM OR SERVICE: HCPCS | Performed by: EMERGENCY MEDICINE

## 2017-08-04 PROCEDURE — 700111 HCHG RX REV CODE 636 W/ 250 OVERRIDE (IP): Performed by: EMERGENCY MEDICINE

## 2017-08-04 RX ORDER — OMEPRAZOLE 20 MG/1
20 CAPSULE, DELAYED RELEASE ORAL DAILY
Status: DISCONTINUED | OUTPATIENT
Start: 2017-08-04 | End: 2017-08-09 | Stop reason: HOSPADM

## 2017-08-04 RX ORDER — PREDNISONE 5 MG/1
15 TABLET ORAL DAILY
COMMUNITY
End: 2017-12-18 | Stop reason: SDUPTHER

## 2017-08-04 RX ORDER — AMOXICILLIN 250 MG
2 CAPSULE ORAL 2 TIMES DAILY
Status: DISCONTINUED | OUTPATIENT
Start: 2017-08-04 | End: 2017-08-09 | Stop reason: HOSPADM

## 2017-08-04 RX ORDER — PROMETHAZINE HYDROCHLORIDE 25 MG/1
12.5-25 SUPPOSITORY RECTAL EVERY 4 HOURS PRN
Status: DISCONTINUED | OUTPATIENT
Start: 2017-08-04 | End: 2017-08-09 | Stop reason: HOSPADM

## 2017-08-04 RX ORDER — POLYETHYLENE GLYCOL 3350 17 G/17G
1 POWDER, FOR SOLUTION ORAL
Status: DISCONTINUED | OUTPATIENT
Start: 2017-08-04 | End: 2017-08-09 | Stop reason: HOSPADM

## 2017-08-04 RX ORDER — SODIUM CHLORIDE 9 MG/ML
30 INJECTION, SOLUTION INTRAVENOUS
Status: DISCONTINUED | OUTPATIENT
Start: 2017-08-04 | End: 2017-08-05

## 2017-08-04 RX ORDER — CEFTRIAXONE 2 G/1
2 INJECTION, POWDER, FOR SOLUTION INTRAMUSCULAR; INTRAVENOUS ONCE
Status: COMPLETED | OUTPATIENT
Start: 2017-08-04 | End: 2017-08-04

## 2017-08-04 RX ORDER — FOLIC ACID 1 MG/1
1 TABLET ORAL DAILY
COMMUNITY

## 2017-08-04 RX ORDER — PROMETHAZINE HYDROCHLORIDE 25 MG/1
12.5-25 TABLET ORAL EVERY 4 HOURS PRN
Status: DISCONTINUED | OUTPATIENT
Start: 2017-08-04 | End: 2017-08-09 | Stop reason: HOSPADM

## 2017-08-04 RX ORDER — SODIUM CHLORIDE 9 MG/ML
INJECTION, SOLUTION INTRAVENOUS CONTINUOUS
Status: DISCONTINUED | OUTPATIENT
Start: 2017-08-04 | End: 2017-08-09 | Stop reason: HOSPADM

## 2017-08-04 RX ORDER — MAGNESIUM SULFATE HEPTAHYDRATE 500 MG/ML
1 INJECTION, SOLUTION INTRAMUSCULAR; INTRAVENOUS ONCE
Status: DISCONTINUED | OUTPATIENT
Start: 2017-08-04 | End: 2017-08-04

## 2017-08-04 RX ORDER — ONDANSETRON 2 MG/ML
4 INJECTION INTRAMUSCULAR; INTRAVENOUS EVERY 4 HOURS PRN
Status: DISCONTINUED | OUTPATIENT
Start: 2017-08-04 | End: 2017-08-09 | Stop reason: HOSPADM

## 2017-08-04 RX ORDER — POTASSIUM CHLORIDE 7.45 MG/ML
10 INJECTION INTRAVENOUS ONCE
Status: COMPLETED | OUTPATIENT
Start: 2017-08-04 | End: 2017-08-04

## 2017-08-04 RX ORDER — SODIUM CHLORIDE 9 MG/ML
500 INJECTION, SOLUTION INTRAVENOUS
Status: DISCONTINUED | OUTPATIENT
Start: 2017-08-04 | End: 2017-08-05

## 2017-08-04 RX ORDER — ONDANSETRON 4 MG/1
4 TABLET, ORALLY DISINTEGRATING ORAL EVERY 4 HOURS PRN
Status: DISCONTINUED | OUTPATIENT
Start: 2017-08-04 | End: 2017-08-09 | Stop reason: HOSPADM

## 2017-08-04 RX ORDER — POTASSIUM CHLORIDE 20 MEQ/1
40 TABLET, EXTENDED RELEASE ORAL ONCE
Status: COMPLETED | OUTPATIENT
Start: 2017-08-04 | End: 2017-08-04

## 2017-08-04 RX ORDER — FOLIC ACID 1 MG/1
1 TABLET ORAL DAILY
Status: DISCONTINUED | OUTPATIENT
Start: 2017-08-04 | End: 2017-08-09 | Stop reason: HOSPADM

## 2017-08-04 RX ORDER — PREDNISONE 5 MG/1
15 TABLET ORAL DAILY
Status: DISCONTINUED | OUTPATIENT
Start: 2017-08-04 | End: 2017-08-09 | Stop reason: HOSPADM

## 2017-08-04 RX ORDER — ACETAMINOPHEN 325 MG/1
650 TABLET ORAL EVERY 6 HOURS PRN
Status: DISCONTINUED | OUTPATIENT
Start: 2017-08-04 | End: 2017-08-09 | Stop reason: HOSPADM

## 2017-08-04 RX ORDER — OXYBUTYNIN CHLORIDE 5 MG/1
5 TABLET ORAL 2 TIMES DAILY
Status: DISCONTINUED | OUTPATIENT
Start: 2017-08-04 | End: 2017-08-09 | Stop reason: HOSPADM

## 2017-08-04 RX ORDER — SODIUM CHLORIDE 9 MG/ML
30 INJECTION, SOLUTION INTRAVENOUS ONCE
Status: COMPLETED | OUTPATIENT
Start: 2017-08-04 | End: 2017-08-04

## 2017-08-04 RX ORDER — BISACODYL 10 MG
10 SUPPOSITORY, RECTAL RECTAL
Status: DISCONTINUED | OUTPATIENT
Start: 2017-08-04 | End: 2017-08-09 | Stop reason: HOSPADM

## 2017-08-04 RX ADMIN — OXYBUTYNIN CHLORIDE 5 MG: 5 TABLET ORAL at 23:28

## 2017-08-04 RX ADMIN — CEFTRIAXONE SODIUM 2 G: 2 INJECTION, POWDER, FOR SOLUTION INTRAMUSCULAR; INTRAVENOUS at 14:37

## 2017-08-04 RX ADMIN — MAGNESIUM GLUCONATE 500 MG ORAL TABLET 400 MG: 500 TABLET ORAL at 23:28

## 2017-08-04 RX ADMIN — MAGNESIUM SULFATE IN DEXTROSE 1 G: 10 INJECTION, SOLUTION INTRAVENOUS at 15:08

## 2017-08-04 RX ADMIN — SODIUM CHLORIDE 2178 ML: 9 INJECTION, SOLUTION INTRAVENOUS at 13:07

## 2017-08-04 RX ADMIN — SODIUM CHLORIDE: 9 INJECTION, SOLUTION INTRAVENOUS at 23:28

## 2017-08-04 RX ADMIN — PREDNISONE 15 MG: 10 TABLET ORAL at 23:33

## 2017-08-04 RX ADMIN — POTASSIUM CHLORIDE 40 MEQ: 1500 TABLET, EXTENDED RELEASE ORAL at 13:38

## 2017-08-04 RX ADMIN — FOLIC ACID 1 MG: 1 TABLET ORAL at 23:28

## 2017-08-04 RX ADMIN — OMEPRAZOLE 20 MG: 20 CAPSULE, DELAYED RELEASE ORAL at 23:29

## 2017-08-04 RX ADMIN — POTASSIUM CHLORIDE 10 MEQ: 7.46 INJECTION, SOLUTION INTRAVENOUS at 13:51

## 2017-08-04 RX ADMIN — HYDROCORTISONE SODIUM SUCCINATE 100 MG: 100 INJECTION, POWDER, FOR SOLUTION INTRAMUSCULAR; INTRAVENOUS at 13:47

## 2017-08-04 ASSESSMENT — ENCOUNTER SYMPTOMS
FEVER: 1
CHILLS: 0
HEADACHES: 1
DIARRHEA: 1
VOMITING: 0
BACK PAIN: 0
DIZZINESS: 0
MYALGIAS: 1
DIZZINESS: 1
BRUISES/BLEEDS EASILY: 0
SHORTNESS OF BREATH: 0
FEVER: 0
DOUBLE VISION: 0
NAUSEA: 1
MYALGIAS: 1
FALLS: 0
MEMORY LOSS: 0
WEAKNESS: 1
NERVOUS/ANXIOUS: 0
FOCAL WEAKNESS: 0
INSOMNIA: 0
BLURRED VISION: 0
WEAKNESS: 1
SHORTNESS OF BREATH: 0
HEADACHES: 0
NAUSEA: 0
PALPITATIONS: 0
FLANK PAIN: 0
COUGH: 0
CHILLS: 1
DIARRHEA: 1
PALPITATIONS: 0
DEPRESSION: 0
SPEECH CHANGE: 0
ABDOMINAL PAIN: 1
COUGH: 0
NERVOUS/ANXIOUS: 0
HALLUCINATIONS: 0
SORE THROAT: 0
SENSORY CHANGE: 0

## 2017-08-04 ASSESSMENT — COGNITIVE AND FUNCTIONAL STATUS - GENERAL
MOBILITY SCORE: 24
SUGGESTED CMS G CODE MODIFIER DAILY ACTIVITY: CH
DAILY ACTIVITIY SCORE: 24
SUGGESTED CMS G CODE MODIFIER MOBILITY: CH

## 2017-08-04 ASSESSMENT — COPD QUESTIONNAIRES
HAVE YOU SMOKED AT LEAST 100 CIGARETTES IN YOUR ENTIRE LIFE: NO/DON'T KNOW
COPD SCREENING SCORE: 4
DO YOU EVER COUGH UP ANY MUCUS OR PHLEGM?: YES, A FEW DAYS A WEEK OR MONTH
DURING THE PAST 4 WEEKS HOW MUCH DID YOU FEEL SHORT OF BREATH: SOME OF THE TIME

## 2017-08-04 ASSESSMENT — PAIN SCALES - GENERAL
PAINLEVEL_OUTOF10: 0
PAINLEVEL_OUTOF10: 7

## 2017-08-04 ASSESSMENT — PATIENT HEALTH QUESTIONNAIRE - PHQ9
5. POOR APPETITE OR OVEREATING: MORE THAN HALF THE DAYS
1. LITTLE INTEREST OR PLEASURE IN DOING THINGS: NEARLY EVERY DAY
SUM OF ALL RESPONSES TO PHQ9 QUESTIONS 1 AND 2: 4
8. MOVING OR SPEAKING SO SLOWLY THAT OTHER PEOPLE COULD HAVE NOTICED. OR THE OPPOSITE, BEING SO FIGETY OR RESTLESS THAT YOU HAVE BEEN MOVING AROUND A LOT MORE THAN USUAL: NEARLY EVERY DAY
7. TROUBLE CONCENTRATING ON THINGS, SUCH AS READING THE NEWSPAPER OR WATCHING TELEVISION: NEARLY EVERY DAY
6. FEELING BAD ABOUT YOURSELF - OR THAT YOU ARE A FAILURE OR HAVE LET YOURSELF OR YOUR FAMILY DOWN: NEARLY EVERY DAY
3. TROUBLE FALLING OR STAYING ASLEEP OR SLEEPING TOO MUCH: NEARLY EVERY DAY
SUM OF ALL RESPONSES TO PHQ QUESTIONS 1-9: 21
2. FEELING DOWN, DEPRESSED, IRRITABLE, OR HOPELESS: SEVERAL DAYS
9. THOUGHTS THAT YOU WOULD BE BETTER OFF DEAD, OR OF HURTING YOURSELF: NOT AT ALL
4. FEELING TIRED OR HAVING LITTLE ENERGY: NEARLY EVERY DAY

## 2017-08-04 ASSESSMENT — LIFESTYLE VARIABLES
EVER_SMOKED: NEVER
EVER_SMOKED: NEVER
ALCOHOL_USE: NO

## 2017-08-04 NOTE — IP AVS SNAPSHOT
8/9/2017    [place] [address]    Dear Brandi:    [facility] wants to ensure your discharge home is safe and you or your loved ones have had all of your questions answered regarding your care after you leave the hospital.    Below is a list of resources and contact information should you have any questions regarding your hospital stay, follow-up instructions, or active medical symptoms.    Questions or Concerns Regarding… Contact   Medical Questions Related to Your Discharge  (7 days a week, 8am-5pm) Contact a Nurse Care Coordinator   [phone]   Medical Questions Not Related to Your Discharge  (24 hours a day / 7 days a week)  Contact the Nurse Health Line   [phone]    Medications or Discharge Instructions Refer to your discharge packet   or contact your [facility] Primary Care Provider   [phone]   Follow-up Appointment(s) Schedule your appointment via [employer]   or contact Scheduling [phone]   Billing Review your statement via [employer]  or contact Billing [phone]   Medical Records Review your records via [employer]   or contact Medical Records [phone]     You may receive a telephone call within two days of discharge. This call is to make certain you understand your discharge instructions and have the opportunity to have any questions answered. You can also easily access your medical information, test results and upcoming appointments via the [employer] free online health management tool. You can learn more and sign up at [email or web address]. For assistance setting up your [employer] account, please call [phone].    Once again, we want to ensure your discharge home is safe and that you have a clear understanding of any next steps in your care. If you have any questions or concerns, please do not hesitate to contact us, we are here for you. Thank you for choosing [facility] for your healthcare needs.    Sincerely,    Your [facility] Healthcare Team

## 2017-08-04 NOTE — IP AVS SNAPSHOT
" Home Care Instructions                                                                                                                  Name:Brandi Leggett  Medical Record Number:[id number]  CSN: [id number]    YOB: 1965   Age: 51 y.o.  Sex: female  HT:1.6 m (5' 2.99\") WT: 85 kg (187 lb 6.3 oz)          Admit Date: 8/4/2017     Discharge Date:   Today's Date: 8/9/2017  Attending Doctor:  Kanwal Redmond D.O.                  Allergies:  Codeine            Discharge Instructions       Discharge Instructions    Discharged to home by car with friend. Discharged via wheelchair, hospital escort: Yes.  Special equipment needed: Not Applicable    Be sure to schedule a follow-up appointment with your primary care doctor or any specialists as instructed.     Discharge Plan:   Influenza Vaccine Indication: Not indicated: Previously immunized this influenza season and > 8 years of age    I understand that a diet low in cholesterol, fat, and sodium is recommended for good health. Unless I have been given specific instructions below for another diet, I accept this instruction as my diet prescription.   Other diet: Cardiac    Special Instructions: None    · Is patient discharged on Warfarin / Coumadin?   No     · Is patient Post Blood Transfusion?  Yes  POST BLOOD TRANSFUSION INFORMATION (ADULT)    The purpose of blood transfusion is to correct anemia, low levels of blood clotting factors or to correct acute blood loss.   Blood transfusion is very safe but occasionally unexpected adverse reactions do occur. Most adverse reactions occur during transfusion, within one to two days following transfusion or one to two weeks following transfusion. Some adverse reactions can occur one to six months after transfusion and some even years later.             If any of the symptoms listed below happen to you during your transfusion,                                 please notify your nurse immediately.   · Fever or " Chills  · Flushing of the Face  · Hives, rash or itching  · Difficulty in breathing or shortness of breath  · Pain or oozing of blood from the IV needle site  · Low back pain  · Nausea or vomiting  · Weakness or fainting  · Chest pain  · Blood in the urine  · Decreased frequency of urination    The above symptoms may also occur within 24 to 48 after transfusion; if so, notify your physician.     · Yellowing of the skin    This can happen one to six months after transfusion; if so, notify your physician    Depression / Suicide Risk    As you are discharged from this [facility] facility, it is important to learn how to keep safe from harming yourself.    Recognize the warning signs:  · Abrupt changes in personality, positive or negative- including increase in energy   · Giving away possessions  · Change in eating patterns- significant weight changes-  positive or negative  · Change in sleeping patterns- unable to sleep or sleeping all the time   · Unwillingness or inability to communicate  · Depression  · Unusual sadness, discouragement and loneliness  · Talk of wanting to die  · Neglect of personal appearance   · Rebelliousness- reckless behavior  · Withdrawal from people/activities they love  · Confusion- inability to concentrate     If you or a loved one observes any of these behaviors or has concerns about self-harm, here's what you can do:  · Talk about it- your feelings and reasons for harming yourself  · Remove any means that you might use to hurt yourself (examples: pills, rope, extension cords, firearm)  · Get professional help from the community (Mental Health, Substance Abuse, psychological counseling)  · Do not be alone:Call your Safe Contact- someone whom you trust who will be there for you.  · Call your local CRISIS HOTLINE 734-5337 or 901-307-5319  · Call your local Children's Mobile Crisis Response Team [place] (258) 854-4729 or www.[employer]  · Call the toll free National Suicide  Prevention Hotlines   · National Suicide Prevention Lifeline 711-321-SVFA (9149)  · [facility] 800-SUICIDE (933-0488)    Esophagitis  Esophagitis is inflammation of the esophagus. It can involve swelling, soreness, and pain in the esophagus. This condition can make it difficult and painful to swallow.  CAUSES   Most causes of esophagitis are not serious. Many different factors can cause esophagitis, including:  · Gastroesophageal reflux disease (GERD). This is when acid from your stomach flows up into the esophagus.  · Recurrent vomiting.  · An allergic-type reaction.  · Certain medicines, especially those that come in large pills.  · Ingestion of harmful chemicals, such as household cleaning products.  · Heavy alcohol use.  · An infection of the esophagus.  · Radiation treatment for cancer.  · Certain diseases such as sarcoidosis, Crohn's disease, and scleroderma. These diseases may cause recurrent esophagitis.  SYMPTOMS   · Trouble swallowing.  · Painful swallowing.  · Chest pain.  · Difficulty breathing.  · Nausea.  · Vomiting.  · Abdominal pain.  DIAGNOSIS   Your caregiver will take your history and do a physical exam. Depending upon what your caregiver finds, certain tests may also be done, including:  · Barium X-ray. You will drink a solution that coats the esophagus, and X-rays will be taken.  · Endoscopy. A lighted tube is put down the esophagus so your caregiver can examine the area.  · Allergy tests. These can sometimes be arranged through follow-up visits.  TREATMENT   Treatment will depend on the cause of your esophagitis. In some cases, steroids or other medicines may be given to help relieve your symptoms or to treat the underlying cause of your condition. Medicines that may be recommended include:  · Viscous lidocaine, to soothe the esophagus.  · Antacids.  · Acid reducers.  · Proton pump inhibitors.  · Antiviral medicines for certain viral infections of the esophagus.  · Antifungal  medicines for certain fungal infections of the esophagus.  · Antibiotic medicines, depending on the cause of the esophagitis.  HOME CARE INSTRUCTIONS   2. Avoid foods and drinks that seem to make your symptoms worse.  3. Eat small, frequent meals instead of large meals.  4. Avoid eating for the 3 hours prior to your bedtime.  5. If you have trouble taking pills, use a pill splitter to decrease the size and likelihood of the pill getting stuck or injuring the esophagus on the way down. Drinking water after taking a pill also helps.  6. Stop smoking if you smoke.  7. Maintain a healthy weight.  8. Wear loose-fitting clothing. Do not wear anything tight around your waist that causes pressure on your stomach.  9. Raise the head of your bed 6 to 8 inches with wood blocks to help you sleep. Extra pillows will not help.  10. Only take over-the-counter or prescription medicines as directed by your caregiver.  SEEK IMMEDIATE MEDICAL CARE IF:  · You have severe chest pain that radiates into your arm, neck, or jaw.  · You feel sweaty, dizzy, or lightheaded.  · You have shortness of breath.  · You vomit blood.  · You have difficulty or pain with swallowing.  · You have bloody or black, tarry stools.  · You have a fever.  · You have a burning sensation in the chest more than 3 times a week for more than 2 weeks.  · You cannot swallow, drink, or eat.  · You drool because you cannot swallow your saliva.  MAKE SURE YOU:  · Understand these instructions.  · Will watch your condition.  · Will get help right away if you are not doing well or get worse.     This information is not intended to replace advice given to you by your health care provider. Make sure you discuss any questions you have with your health care provider.     Document Released: 01/25/2006 Document Revised: 01/08/2016 Document Reviewed: 04/13/2016  [employer] Interactive Patient Education ©2016 Elsevier Inc.    Colonoscopy, Care After  Refer to this sheet in the next  few weeks. These instructions provide you with information on caring for yourself after your procedure. Your health care provider may also give you more specific instructions. Your treatment has been planned according to current medical practices, but problems sometimes occur. Call your health care provider if you have any problems or questions after your procedure.  WHAT TO EXPECT AFTER THE PROCEDURE   After your procedure, it is typical to have the following:  · A small amount of blood in your stool.  · Moderate amounts of gas and mild abdominal cramping or bloating.  HOME CARE INSTRUCTIONS  · Do not drive, operate machinery, or sign important documents for 24 hours.  · You may shower and resume your regular physical activities, but move at a slower pace for the first 24 hours.  · Take frequent rest periods for the first 24 hours.  · Walk around or put a warm pack on your abdomen to help reduce abdominal cramping and bloating.  · Drink enough fluids to keep your urine clear or pale yellow.  · You may resume your normal diet as instructed by your health care provider. Avoid heavy or fried foods that are hard to digest.  · Avoid drinking alcohol for 24 hours or as instructed by your health care provider.  · Only take over-the-counter or prescription medicines as directed by your health care provider.  · If a tissue sample (biopsy) was taken during your procedure:  ¨ Do not take aspirin or blood thinners for 7 days, or as instructed by your health care provider.  ¨ Do not drink alcohol for 7 days, or as instructed by your health care provider.  ¨ Eat soft foods for the first 24 hours.  SEEK MEDICAL CARE IF:  You have persistent spotting of blood in your stool 2-3 days after the procedure.  SEEK IMMEDIATE MEDICAL CARE IF:  · You have more than a small spotting of blood in your stool.  · You pass large blood clots in your stool.  · Your abdomen is swollen (distended).  · You have nausea or vomiting.  · You have a  fever.  · You have increasing abdominal pain that is not relieved with medicine.     This information is not intended to replace advice given to you by your health care provider. Make sure you discuss any questions you have with your health care provider.     Document Released: 08/01/2005 Document Revised: 10/08/2014 Document Reviewed: 08/25/2014  [employer] Interactive Patient Education ©2016 [employer]    Esophagogastroduodenoscopy, Care After  Refer to this sheet in the next few weeks. These instructions provide you with information about caring for yourself after your procedure. Your health care provider may also give you more specific instructions. Your treatment has been planned according to current medical practices, but problems sometimes occur. Call your health care provider if you have any problems or questions after your procedure.  WHAT TO EXPECT AFTER THE PROCEDURE  After your procedure, it is typical to feel:  · Soreness in your throat.  · Pain with swallowing.  · Sick to your stomach (nauseous).  · Bloated.  · Dizzy.  · Fatigued.  HOME CARE INSTRUCTIONS  · Do not eat or drink anything until the numbing medicine (local anesthetic) has worn off and your gag reflex has returned. You will know that the local anesthetic has worn off when you can swallow comfortably.  · Do not drive or operate machinery until directed by your health care provider.  · Take medicines only as directed by your health care provider.  SEEK MEDICAL CARE IF:   · You cannot stop coughing.  · You are not urinating at all or less than usual.  SEEK IMMEDIATE MEDICAL CARE IF:  · You have difficulty swallowing.  · You cannot eat or drink.  · You have worsening throat or chest pain.  · You have dizziness or lightheadedness or you faint.  · You have nausea or vomiting.  · You have chills.  · You have a fever.  · You have severe abdominal pain.  · You have black, tarry, or bloody stools.     This information is not intended to replace  advice given to you by your health care provider. Make sure you discuss any questions you have with your health care provider.     Document Released: 12/04/2013 Document Revised: 01/08/2016 Document Reviewed: 12/04/2013  [employer] Interactive Patient Education ©2016 [employer]    Blood Transfusion   A blood transfusion is a procedure that gives you donated blood through an IV tube. You may need blood because of illness, surgery, or injury. The blood may come from a donor. The blood may also be your own blood that you donated earlier.  The blood you get is made up of different types of cells. You may get:   · Red blood cells. These carry oxygen and replace lost blood.    · Platelets. These control bleeding.    · Plasma. This helps blood to clot.  If you have a clotting disorder, you may also get other types of blood products.   BEFORE THE PROCEDURE  · You may have a blood test. This finds out what type of blood you have. It also finds out what kind of blood your body will accept.    · If you are going to have a planned surgery, you may donate your own blood. This is done in case you need to have a transfusion.    · If you have had an allergic transfusion reaction before, you may be given medicine to help prevent a reaction. Take this medicine only as told by your doctor.  · You will have your temperature, blood pressure, and pulse checked.  PROCEDURE   · An IV will be started in your hand or arm.    · The bag of donated blood will be attached to your IV and run into your vein.    · A doctor will regularly check your temperature, blood pressure, and pulse during the procedure. This is done to find any early signs of a transfusion reaction.  · If you have any signs or symptoms of a reaction, the procedure may be stopped and you may be given medicine.    · When the transfusion is over, your IV will be removed.    · Pressure may be applied to the IV site for a few minutes.    · A bandage (dressing) will be applied.     The procedure may vary among doctors and hospitals.   AFTER THE PROCEDURE  · Your blood pressure, temperature, and pulse will be checked regularly.     This information is not intended to replace advice given to you by your health care provider. Make sure you discuss any questions you have with your health care provider.     Document Released: 03/16/2010 Document Revised: 01/08/2016 Document Reviewed: 10/28/2015  [employer] Interactive Patient Education ©2016 Elsevier Inc.    Sepsis, Adult  Sepsis is a serious infection of your blood or tissues that affects your whole body. The infection that causes sepsis may be bacterial, viral, fungal, or parasitic. Sepsis may be life threatening. Sepsis can cause your blood pressure to drop. This may result in shock. Shock causes your central nervous system and your organs to stop working correctly.   RISK FACTORS  Sepsis can happen in anyone, but it is more likely to happen in people who have weakened immune systems.  SIGNS AND SYMPTOMS   Symptoms of sepsis can include:  · Fever or low body temperature (hypothermia).  · Rapid breathing (hyperventilation).  · Chills.  · Rapid heartbeat (tachycardia).  · Confusion or light-headedness.  · Trouble breathing.  · Urinating much less than usual.  · Cool, clammy skin or red, flushed skin.  · Other problems with the heart, kidneys, or brain.  DIAGNOSIS   Your health care provider will likely do tests to look for an infection, to see if the infection has spread to your blood, and to see how serious your condition is. Tests can include:  · Blood tests, including cultures of your blood.  · Cultures of other fluids from your body, such as:  ¨ Urine.  ¨ Pus from wounds.  ¨ Mucus coughed up from your lungs.  · Urine tests other than cultures.  · X-ray exams or other imaging tests.  TREATMENT   Treatment will begin with elimination of the source of infection. If your sepsis is likely caused by a bacterial or fungal infection, you will be  given antibiotic or antifungal medicines.  You may also receive:  · Oxygen.  · Fluids through an IV tube.  · Medicines to increase your blood pressure.  · A machine to clean your blood (dialysis) if your kidneys fail.  · A machine to help you breathe if your lungs fail.  SEEK IMMEDIATE MEDICAL CARE IF:  You get an infection or develop any of the signs and symptoms of sepsis after surgery or a hospitalization.     This information is not intended to replace advice given to you by your health care provider. Make sure you discuss any questions you have with your health care provider.     Document Released: 09/15/2004 Document Revised: 05/03/2016 Document Reviewed: 08/25/2014  [employer] Interactive Patient Education ©2016 Elsevier Inc.        Your appointments     Aug 18, 2017  1:30 PM   New Patient with Roz Jarquin M.D.   [facility]    [address] [address]   [phone]           Please bring Photo ID, Insurance Cards, All Medication Bottles and copies of any legal documents (such as Living Will, Power of ) If speaking a language besides English please bring an adult . Please arrive 30 minutes prior for check in and registration. You will be receiving a confirmation call a few days before your appointment from our automated call confirmation system.            Sep 27, 2017  2:30 PM   Follow Up Visit with Yamilka Mcknight M.D.   [facility] (--)    [address], [address]  [place] [id number] [phone]           You will be receiving a confirmation call a few days before your appointment from our automated call confirmation system.                 Discharge Medication Instructions:    Below are the medications your physician expects you to take upon discharge:    Review all your home medications and newly ordered medications with your doctor and/or pharmacist. Follow medication instructions as directed by your doctor and/or  pharmacist.    Please keep your medication list with you and share with your physician.               Medication List      START taking these medications        Instructions    Morning Afternoon Evening Bedtime    atorvastatin 80 MG tablet   Last time this was given:  80 mg on 8/8/2017  8:25 PM   Commonly known as:  LIPITOR        Take 1 Tab by mouth every bedtime.   Dose:  80 mg                          CONTINUE taking these medications        Instructions    Morning Afternoon Evening Bedtime    Adalimumab 40 MG/0.8ML Pnkt        Inject 0.8 mL as instructed every 14 days.   Dose:  40 mg                        allopurinol 300 MG Tabs   Commonly known as:  ZYLOPRIM        Take 1 Tab by mouth every day.   Dose:  300 mg                        ergocalciferol 83406 UNIT capsule   Commonly known as:  DRISDOL        Take 1 cap once weekly for 12 weeks.                        folic acid 1 MG Tabs   Last time this was given:  1 mg on 8/9/2017  8:56 AM   Commonly known as:  FOLVITE        Take 1 mg by mouth every day.   Dose:  1 mg                        hydroxychloroquine 200 MG Tabs   Last time this was given:  200 mg on 8/9/2017  8:56 AM   Commonly known as:  PLAQUENIL        Take 1 Tab by mouth 2 times a day.   Dose:  200 mg                        lisinopril 20 MG Tabs   Commonly known as:  PRINIVIL        Take 20 mg by mouth every day.   Dose:  20 mg                        omeprazole 20 MG delayed-release capsule   Last time this was given:  20 mg on 8/9/2017  8:56 AM   Commonly known as:  PRILOSEC        Take 1 Cap by mouth every day.   Dose:  20 mg                        oxybutynin 5 MG Tabs   Last time this was given:  5 mg on 8/9/2017  8:56 AM   Commonly known as:  DITROPAN        Take 5 mg by mouth 2 Times a Day.   Dose:  5 mg                        predniSONE 5 MG Tabs   Last time this was given:  15 mg on 8/9/2017  8:56 AM   Commonly known as:  DELTASONE        Take 15 mg by mouth every day.   Dose:  15 mg                           STOP taking these medications     gemfibrozil 600 MG Tabs   Commonly known as:  LOPID                    Where to Get Your Medications      Information about where to get these medications is not yet available     ! Ask your nurse or doctor about these medications    - atorvastatin 80 MG tablet            Instructions           Diet / Nutrition:    Follow any diet instructions given to you by your doctor or the dietician, including how much salt (sodium) you are allowed each day.    If you are overweight, talk to your doctor about a weight reduction plan.    Activity:    Remain physically active following your doctor's instructions about exercise and activity.    Rest often.     Any time you become even a little tired or short of breath, SIT DOWN and rest.    Worsening Symptoms:    Report any of the following signs and symptoms to the doctor's office immediately:    *Pain of jaw, arm, or neck  *Chest pain not relieved by medication                               *Dizziness or loss of consciousness  *Difficulty breathing even when at rest   *More tired than usual                                       *Bleeding drainage or swelling of surgical site  *Swelling of feet, ankles, legs or stomach                 *Fever (>100ºF)  *Pink or blood tinged sputum  *Weight gain (3lbs/day or 5lbs /week)           *Shock from internal defibrillator (if applicable)  *Palpitations or irregular heartbeats                *Cool and/or numb extremities    Stroke Awareness    Common Risk Factors for Stroke include:    Age  Atrial Fibrillation  Carotid Artery Stenosis  Diabetes Mellitus  Excessive alcohol consumption  High blood pressure  Overweight   Physical inactivity  Smoking    Warning signs and symptoms of a stroke include:    *Sudden numbness or weakness of the face, arm or leg (especially on one side of the body).  *Sudden confusion, trouble speaking or understanding.  *Sudden trouble seeing in one or both  eyes.  *Sudden trouble walking, dizziness, loss of balance or coordination.Sudden severe headache with no known cause.    It is very important to get treatment quickly when a stroke occurs. If you experience any of the above warning signs, call 911 immediately.                   Disclaimer         Quit Smoking / Tobacco Use:    I understand the use of any tobacco products increases my chance of suffering from future heart disease or stroke and could cause other illnesses which may shorten my life. Quitting the use of tobacco products is the single most important thing I can do to improve my health. For further information on smoking / tobacco cessation call a Toll Free Quit Line at 1-573.945.7642 (*[employer] [place]) or 1-838.935.2938 (American Lung Association) or you can access the web based program at www.[employer].org.    [place] Tobacco Users Help Line:  (371) 182-9116       Toll Free: 1-639.155.9066  Quit Tobacco Program [facility] (137)913-8089    Crisis Hotline:    [place] Crisis Hotline:  1-317-RUMGROH or 1-535.101.9308    [place] Crisis Hotline:    1-361.596.8948 or 028-116-0764    Discharge Survey:   Thank you for choosing [facility]. We hope we did everything we could to make your hospital stay a pleasant one. You may be receiving a phone survey and we would appreciate your time and participation in answering the questions. Your input is very valuable to us in our efforts to improve our service to our patients and their families.        My signature on this form indicates that:    1. I have reviewed and understand the above information.  2. My questions regarding this information have been answered to my satisfaction.  3. I have formulated a plan with my discharge nurse to obtain my prescribed medications for home.                  Disclaimer         __________________________________                     __________       ________                       Patient Signature                                                  Date                    Time

## 2017-08-04 NOTE — TELEPHONE ENCOUNTER
Please call patient.  I received her labs and there are several abnormalities including severely low potassium, elevated liver enzymes and decreased kidney function.   Also her labs do show inflammation.      First she should hold her alendronate as her kidney function is impaired.  Next for her electrolyte abnormalities and impaired kidney function  I need her to be evaluated in the ER.

## 2017-08-04 NOTE — MR AVS SNAPSHOT
"        Brandi Leggett   2017 10:15 AM   Office Visit   MRN: [id number]    Department:  [facility]   Dept Phone:  [phone]    Description:  Female : 1965   Provider:  Vesta Cochran D.O.           Reason for Visit     Dizziness was sent over by Dr Mcknight, low Potassium      Allergies as of 2017     Allergen Noted Reactions    Codeine 2014         Vital Signs     Blood Pressure Pulse Temperature Respirations Height Weight    86/58 mmHg 127 36.4 °C (97.5 °F) 16 1.6 m (5' 3\") 75.751 kg (167 lb)    Body Mass Index Oxygen Saturation Smoking Status             29.59 kg/m2 99% Never Smoker          Basic Information     Date Of Birth Sex Race Ethnicity Preferred Language    1965 Female White Non- English      Your appointments     Sep 27, 2017  2:30 PM   Follow Up Visit with Yamilka Mcknight M.D.   [facility] (--)    [address] [place] [address], [address] [address] [phone]           You will be receiving a confirmation call a few days before your appointment from our automated call confirmation system.              Problem List              ICD-10-CM Priority Class Noted - Resolved    Vertigo R42   2014 - Present    Left arm swelling M79.89   2014 - Present    SLE (systemic lupus erythematosus) (CMS-[facility]) (Chronic) M32.9 Medium  Unknown - Present    Psoriatic arthritis (CMS-HCC) L40.50   Unknown - Present    Pancreatitis K85.90   Unknown - Present    Right hand pain M79.641   9/15/2015 - Present    Hyperuricemia E79.0   9/15/2015 - Present    Polyarthralgia M25.50   9/15/2015 - Present    Chronic inflammatory arthritis M19.90   9/15/2015 - Present    Chronic tophaceous gout M1A.9XX1   2015 - Present    Drug-induced chronic pancreatitis (CMS-[facility]) K86.1   10/6/2015 - Present    Vitamin D deficiency E55.9   10/6/2015 - Present    Osteopenia M85.80   10/26/2015 - Present    Dysuria R30.0   2015 - Present    Abnormal LFTs R79.89   2016 " - Present    Abdominal pain in female patient R10.9   1/14/2016 - Present    Xanthoma E75.5   2/10/2016 - Present    Hypertriglyceridemia E78.1   2/10/2016 - Present    Traumatic periorbital ecchymosis S00.10XA   4/4/2016 - Present    Chronic right shoulder pain M25.511, G89.29   6/2/2016 - Present    Essential hypertension I10   6/2/2016 - Present    Skin lesions L98.9   6/2/2016 - Present    Chronic fatigue R53.82   7/14/2016 - Present    Hyperlipidemia (Chronic) E78.5   7/14/2016 - Present    DDD (degenerative disc disease), cervical M50.30   7/14/2016 - Present    Septic shock (CMS-HCC) A41.9, R65.21 High  5/27/2017 - Present    Diarrhea R19.7 High  5/28/2017 - Present    RENEE (acute kidney injury) (CMS-HCC) N17.9 Low  5/28/2017 - Present    Abdominal pain R10.9 High  5/28/2017 - Present    Chronic rheumatic arthritis (CMS-HCC) (Chronic) M06.9 Medium  6/1/2017 - Present    Electrolyte depletion E87.8 High  6/1/2017 - Present    Alcohol abuse (Chronic) F10.10 Medium  6/1/2017 - Present    GI bleeding K92.2 High  6/1/2017 - Present    Low vitamin D level (Chronic) E55.9 Low  6/1/2017 - Present    HTN (hypertension) (Chronic) I10   6/1/2017 - Present      Health Maintenance        Date Due Completion Dates    IMM HEP B VACCINE (1 of 3 - Primary Series) 1965 ---    DIABETES MONOFILAMENT / LE EXAM 6/14/1968 ---    RETINAL SCREENING 12/14/1985 ---    URINE ACR / MICROALBUMIN 12/14/1985 ---    IMM DTaP/Tdap/Td Vaccine (1 - Tdap) 12/14/1986 ---    PAP SMEAR 12/14/1988 ---    MAMMOGRAM 8/5/2009 8/5/2008, 8/5/2008    A1C SCREENING 3/21/2016 9/21/2015    FASTING LIPID PROFILE 9/21/2016 9/21/2015, 6/22/2015    IMM INFLUENZA (1) 9/1/2017 1/19/2014    SERUM CREATININE 8/3/2018 8/3/2017, 6/4/2017, 6/3/2017, 6/2/2017, 6/1/2017, 6/1/2017, 6/1/2017, 5/31/2017, 5/31/2017, 5/31/2017, 5/30/2017, 5/29/2017, 5/28/2017, 5/27/2017, 5/27/2017, 5/27/2017, 3/30/2017, 11/30/2016, 7/22/2016, 2/10/2016, 12/30/2015, 12/14/2015,  10/21/2015, 9/21/2015, 9/16/2015, 6/22/2015, 2/24/2015, 11/5/2014, 11/4/2014, 6/2/2014, 1/22/2014, 1/21/2014, 1/20/2014, 1/19/2014            Current Immunizations     13-VALENT PCV PREVNAR 5/28/2017 12:17 PM    Influenza TIV (IM) 1/19/2014 11:47 AM    Pneumococcal polysaccharide vaccine (PPSV-23) 12/15/2015 11:45 AM, 1/9/2014      Below and/or attached are the medications your provider expects you to take. Review all of your home medications and newly ordered medications with your provider and/or pharmacist. Follow medication instructions as directed by your provider and/or pharmacist. Please keep your medication list with you and share with your provider. Update the information when medications are discontinued, doses are changed, or new medications (including over-the-counter products) are added; and carry medication information at all times in the event of emergency situations     Allergies:  CODEINE - (reactions not documented)               Medications  Valid as of: August 04, 2017 - 12:08 PM    Generic Name Brand Name Tablet Size Instructions for use    Adalimumab (Pen-injector Kit) Adalimumab 40 MG/0.8ML Inject 0.8 mL as instructed every 14 days.        Allopurinol (Tab) ZYLOPRIM 300 MG Take 1 Tab by mouth every day.        Colchicine (Tab) COLCRYS 0.6 MG For gout flare take 1.2mg x 1 followed by 0.6 mg 1 hour later.        Ergocalciferol (Cap) DRISDOL 46750 UNIT Take 1 cap once weekly for 12 weeks.        Folic Acid (Tab) FOLVITE 1 MG Take 2 tabs daily.        Gemfibrozil (Tab) LOPID 600 MG Take 600 mg by mouth 2 times a day.        Hydroxychloroquine Sulfate (Tab) PLAQUENIL 200 MG Take 1 Tab by mouth 2 times a day.        Lisinopril (Tab) PRINIVIL 20 MG Take 20 mg by mouth every day.        Methotrexate Sodium   Take 1 Tab by mouth every day.        Omeprazole (CAPSULE DELAYED RELEASE) PRILOSEC 20 MG Take 1 Cap by mouth every day.        Oxybutynin Chloride (Tab) DITROPAN 5 MG Take 5 mg by mouth 2 Times a  Day.        PredniSONE (Tab) DELTASONE 5 MG alternate 10 mg and 15 mg q other day.        Sucralfate (Suspension) CARAFATE 1 GM/10ML Take 10 mL by mouth every 6 hours.        .                 Medicines prescribed today were sent to:     [facility] - [place], [place] - [address]    [address] [facility] [address] [facility] [address]    Phone: [phone] Fax: [phone]    Open 24 Hours?: No    [id number] - [place], [place] - [address]    [address] [address]    Phone: [phone] Fax: [phone]    Open 24 Hours?: No      Medication refill instructions:       If your prescription bottle indicates you have medication refills left, it is not necessary to call your provider’s office. Please contact your pharmacy and they will refill your medication.    If your prescription bottle indicates you do not have any refills left, you may request refills at any time through one of the following ways: The online [employer] system (except Urgent Care), by calling your provider’s office, or by asking your pharmacy to contact your provider’s office with a refill request. Medication refills are processed only during regular business hours and may not be available until the next business day. Your provider may request additional information or to have a follow-up visit with you prior to refilling your medication.   *Please Note: Medication refills are assigned a new Rx number when refilled electronically. Your pharmacy may indicate that no refills were authorized even though a new prescription for the same medication is available at the pharmacy. Please request the medicine by name with the pharmacy before contacting your provider for a refill.           [employer] Access Code: [email or web address]  Expires: 9/3/2017 12:08 PM    [employer]  A secure, online tool to manage your health information     [employer]® is a secure, online tool that connects you to your personalized  health information from the privacy of your home -- day or night - making it very easy for you to manage your healthcare. Once the activation process is completed, you can even access your medical information using the [employer] aniket, which is available for free in the Apple Aniket store or Google Play store.     [employer] provides the following levels of access (as shown below):   My Chart Features   [facility] Doctor [facility]  Specialists [facility]  Urgent  Care Non-[facility]  Primary Care  Doctor   Email your healthcare team securely and privately 24/7 X X X    Manage appointments: schedule your next appointment; view details of past/upcoming appointments X      Request prescription refills. X      View recent personal medical records, including lab and immunizations X X X X   View health record, including health history, allergies, medications X X X X   Read reports about your outpatient visits, procedures, consult and ER notes X X X X   See your discharge summary, which is a recap of your hospital and/or ER visit that includes your diagnosis, lab results, and care plan. X X       How to register for [employer]:  1. Go to  [email or web address].  2. Click on the Sign Up Now box, which takes you to the New Member Sign Up page. You will need to provide the following information:  a. Enter your [employer] Access Code exactly as it appears at the top of this page. (You will not need to use this code after you’ve completed the sign-up process. If you do not sign up before the expiration date, you must request a new code.)   b. Enter your date of birth.   c. Enter your home email address.   d. Click Submit, and follow the next screen’s instructions.  3. Create a MicroEnsure ID. This will be your [employer] login ID and cannot be changed, so think of one that is secure and easy to remember.  4. Create a [employer] password. You can change your password at any time.  5. Enter your Password Reset Question and Answer. This can be used at a  later time if you forget your password.   6. Enter your e-mail address. This allows you to receive e-mail notifications when new information is available in [employer].  7. Click Sign Up. You can now view your health information.    For assistance activating your [employer] account, call [phone]

## 2017-08-04 NOTE — TELEPHONE ENCOUNTER
Called patient and discussed with her the lab abnormalities.  Advised her to go to ER. Patient states she can't get to ER but can get to [facility] that is just up the clinic.  She notes some dizziness and subjective fevers

## 2017-08-04 NOTE — ED NOTES
Magnesium hung on pump per MAR.  Pt's  called requesting update on patient.  Given update with patient's verbal consent.

## 2017-08-04 NOTE — IP AVS SNAPSHOT
" <p align=\"LEFT\"><IMG SRC=\"[email or web address]\" alt=\"Image\" WIDTH=\"50%\" HEIGHT=\"200\" BORDER=\"\"></p>                   Name:Brandi Leggett  Medical Record Number:[id number]  CSN: [id number]    YOB: 1965   Age: 51 y.o.  Sex: female  HT:1.6 m (5' 2.99\") WT: 85 kg (187 lb 6.3 oz)          Admit Date: 8/4/2017     Discharge Date:   Today's Date: 8/9/2017  Attending Doctor:  Kanwal Redmond D.O.                  Allergies:  Codeine          Your appointments     Aug 18, 2017  1:30 PM   New Patient with Roz Jarquin M.D.   [facility]    [address] [facility] [address]   [phone]           Please bring Photo ID, Insurance Cards, All Medication Bottles and copies of any legal documents (such as Living Will, Power of ) If speaking a language besides English please bring an adult . Please arrive 30 minutes prior for check in and registration. You will be receiving a confirmation call a few days before your appointment from our automated call confirmation system.            Sep 27, 2017  2:30 PM   Follow Up Visit with Yamilka Mcknight M.D.   [facility] (--)    [address], [address]  [facility] [id number] [phone]           You will be receiving a confirmation call a few days before your appointment from our automated call confirmation system.                 Medication List      Take these Medications        Instructions    Adalimumab 40 MG/0.8ML Pnkt    Inject 0.8 mL as instructed every 14 days.   Dose:  40 mg       allopurinol 300 MG Tabs   Commonly known as:  ZYLOPRIM    Take 1 Tab by mouth every day.   Dose:  300 mg       atorvastatin 80 MG tablet   Commonly known as:  LIPITOR    Take 1 Tab by mouth every bedtime.   Dose:  80 mg       ergocalciferol 60534 UNIT capsule   Commonly known as:  DRISDOL    Take 1 cap once weekly for 12 weeks.       folic acid 1 MG Tabs   Commonly known as:  FOLVITE    Take 1 mg by mouth every day.  "   Dose:  1 mg       hydroxychloroquine 200 MG Tabs   Commonly known as:  PLAQUENIL    Take 1 Tab by mouth 2 times a day.   Dose:  200 mg       lisinopril 20 MG Tabs   Commonly known as:  PRINIVIL    Take 20 mg by mouth every day.   Dose:  20 mg       omeprazole 20 MG delayed-release capsule   Commonly known as:  PRILOSEC    Take 1 Cap by mouth every day.   Dose:  20 mg       oxybutynin 5 MG Tabs   Commonly known as:  DITROPAN    Take 5 mg by mouth 2 Times a Day.   Dose:  5 mg       predniSONE 5 MG Tabs   Commonly known as:  DELTASONE    Take 15 mg by mouth every day.   Dose:  15 mg

## 2017-08-04 NOTE — ED NOTES
Pt arrived via [facility], transfer from  in [place]. Pt c/o dizziness x 4 days with pain behind L eye. Pt went into UC and was found to have low potassium (2.7). Pt denies any chest pain, SOB. Does states n/v/d since yesterday. Denies blood in emesis or stool. Pt hypotensive at 91/58, was given approx 400 cc NS en route.

## 2017-08-04 NOTE — ED NOTES
Georges from Lab called with critical result of potassium 2.7 at 1330. Critical lab result read back to Georges.   Dr. Smith notified of critical lab result at 1331.  Critical lab result read back by Dr. Smith.

## 2017-08-04 NOTE — PROGRESS NOTES
Subjective:      Brandi Leggett is a 51 y.o. female who presents with Dizziness    Complex medical history in pleasant patient who presents to  today. She was recently hospitalized with septic shock. She had a prior h/o of C. difficile but was toxin negative at the time hospitalized from the end of May thru the beginning of June. She states over the past several days she has been feeling dizzy week has had some diarrhea again decreased appetite body aches and some subjective fevers. Her pcp Dr. Mcknight ordered a CBC and a CMP  That was completed yesterday. As she had multiple electrolyte abnormalities including potassium of 2.7 sodium of 124. She was advised to go to the emergency room today states that she could not make it to the emergency room and instead came here to the urgent care. She denies any chest pain she's had shortness of breath has felt lightheaded without actual syncope.    Her  is present and states that everything started in late March when the patient went to visit her dying father who had septic pneumonia and was having a lot of diarrhea. They were in close contact with him and then shortly after the patient herself developed a lot of diarrhea resulting in her own recent hospitalization.      Dizziness  Associated symptoms include chills, a fever, headaches, myalgias, nausea and weakness. Pertinent negatives include no chest pain, coughing, rash or sore throat.     Review of Systems   Constitutional: Positive for fever, chills and malaise/fatigue.   HENT: Negative for sore throat.    Eyes: Negative for blurred vision and double vision.   Respiratory: Negative for cough and shortness of breath.    Cardiovascular: Negative for chest pain and palpitations.   Gastrointestinal: Positive for nausea and diarrhea.   Genitourinary: Negative for dysuria.   Musculoskeletal: Positive for myalgias and joint pain. Negative for falls.   Skin: Negative for itching and rash.   Neurological: Positive for  dizziness, weakness and headaches.   Endo/Heme/Allergies: Does not bruise/bleed easily.   Psychiatric/Behavioral: Negative for hallucinations. The patient is not nervous/anxious and does not have insomnia.      PMH:  has a past medical history of Lupus (CMS-[facility]); Fibroids; SLE (systemic lupus erythematosus) (CMS-[facility]); Psoriatic arthritis (CMS-[facility]); Pancreatitis; and Arthritis.  MEDS:   Current outpatient prescriptions:   •  Adalimumab 40 MG/0.8ML Pen-injector Kit, Inject 0.8 mL as instructed every 14 days., Disp: 2 Each, Rfl: 2  •  predniSONE (DELTASONE) 5 MG Tab, alternate 10 mg and 15 mg q other day., Disp: 75 Tab, Rfl: 1  •  hydroxychloroquine (PLAQUENIL) 200 MG Tab, Take 1 Tab by mouth 2 times a day., Disp: 60 Tab, Rfl: 2  •  omeprazole (PRILOSEC) 20 MG delayed-release capsule, Take 1 Cap by mouth every day., Disp: 30 Cap, Rfl: 3  •  lisinopril (PRINIVIL) 20 MG Tab, Take 20 mg by mouth every day., Disp: , Rfl:   •  folic acid (FOLVITE) 1 MG Tab, Take 2 tabs daily., Disp: 60 Tab, Rfl: 11  •  ergocalciferol (DRISDOL) 09291 UNIT capsule, Take 1 cap once weekly for 12 weeks., Disp: 12 Cap, Rfl: 0  •  gemfibrozil (LOPID) 600 MG Tab, Take 600 mg by mouth 2 times a day., Disp: , Rfl:   •  oxybutynin (DITROPAN) 5 MG TABS, Take 5 mg by mouth 2 Times a Day., Disp: , Rfl:   •  colchicine (COLCRYS) 0.6 MG Tab, For gout flare take 1.2mg x 1 followed by 0.6 mg 1 hour later., Disp: 6 Tab, Rfl: 0  •  sucralfate (CARAFATE) 1 GM/10ML Suspension, Take 10 mL by mouth every 6 hours., Disp: 30 mL, Rfl: 0  •  Methotrexate Sodium (METHOTREXATE PO), Take 1 Tab by mouth every day., Disp: , Rfl:   •  allopurinol (ZYLOPRIM) 300 MG Tab, Take 1 Tab by mouth every day., Disp: 30 Tab, Rfl: 0  ALLERGIES:   Allergies   Allergen Reactions   • Codeine    SURGHX:   Past Surgical History   Procedure Laterality Date   • Gyn surgery  8/2013     Hysteroscopy, D and C   • Colonoscopy N/A 5/31/2017     Procedure: COLONOSCOPY;  Surgeon: Enrique Canseco,  "M.D.;  Location: SURGERY [facility];  Service:    • Gastroscopy with biopsy N/A 5/31/2017     Procedure: GASTROSCOPY WITH BIOPSY;  Surgeon: Enrique Canseco M.D.;  Location: SURGERY [facility];  Service:    SOCHX:  reports that she has never smoked. She does not have any smokeless tobacco history on file. She reports that she does not drink alcohol or use illicit drugs.  FH: Family history was reviewed, no pertinent findings to report    Objective:     BP 86/58 mmHg  Pulse 127  Temp(Src) 36.4 °C (97.5 °F)  Resp 16  Ht 1.6 m (5' 3\")  Wt 75.751 kg (167 lb)  BMI 29.59 kg/m2  SpO2 99%     Physical Exam   Constitutional: She appears well-developed. No distress.   HENT:   Dry mucus membranes   Eyes: Conjunctivae are normal.   Cardiovascular:   tachycardic   Pulmonary/Chest: Effort normal.   Musculoskeletal: Normal range of motion.   Neurological: She is alert.   Skin: Skin is warm and dry. She is not diaphoretic. There is pallor.   Psychiatric: She has a normal mood and affect. Her behavior is normal.       Chart review:  WBC 8.1 4.8 - 10.8 K/uL Final      RBC 2.85 (L) 4.20 - 5.40 M/uL Final     Hemoglobin 8.4 (L) 12.0 - 16.0 g/dL Final     Hematocrit 27.1 (L) 37.0 - 47.0 % Final     MCV 95.1 81.4 - 97.8 fL Final     MCH 29.5 27.0 - 33.0 pg Final     MCHC 31.0 (L) 33.6 - 35.0 g/dL Final     RDW 61.1 (H) 35.9 - 50.0 fL Final     Platelet Count 311 164 - 446 K/uL Final     MPV 10.2 9.0 - 12.9 fL Final     Neutrophils-Polys 42.70 (L) 44.00 - 72.00 % Final     Lymphocytes 36.20 22.00 - 41.00 % Final     Monocytes 15.90 (H) 0.00 - 13.40 % Final     Eosinophils 0.60 0.00 - 6.90 % Final     Basophils 0.90 0.00 - 1.80 % Final     Immature Granulocytes 3.70 (H) 0.00 - 0.90 % Final     Nucleated RBC 0.40 /100 WBC Final     Neutrophils (Absolute) 3.45 2.00 - 7.15 K/uL Final     Comment:      Includes immature neutrophils, if present.     Lymphs (Absolute) 2.92 1.00 - 4.80 K/uL Final     Monos (Absolute) 1.28 (H) " 0.00 - 0.85 K/uL Final     Eos (Absolute) 0.05 0.00 - 0.51 K/uL Final     Baso (Absolute) 0.07 0.00 - 0.12 K/uL Final     Immature Granulocytes (abs) 0.30 (H) 0.00 - 0.11 K/uL Final     NRBC (Absolute) 0.03 K/uL Final       Narrative      Component Value Ref Range & Units Status      Sodium 125 (L) 135 - 145 mmol/L Final     Potassium 2.7 (LL) 3.6 - 5.5 mmol/L Final     Chloride 89 (L) 96 - 112 mmol/L Final     Co2 14 (L) 20 - 33 mmol/L Final     Anion Gap 22.0 (H) 0.0 - 11.9 Final     Glucose 110 (H) 65 - 99 mg/dL Final     Bun 29 (H) 8 - 22 mg/dL Final     Creatinine 3.79 (H) 0.50 - 1.40 mg/dL Final     Calcium 8.8 8.5 - 10.5 mg/dL Final     AST(SGOT) 70 (H) 12 - 45 U/L Final     ALT(SGPT) 24 2 - 50 U/L Final     Alkaline Phosphatase 235 (H) 30 - 99 U/L Final     Total Bilirubin 1.7 (H) 0.1 - 1.5 mg/dL Final     Albumin 3.7 3.2 - 4.9 g/dL Final     Total Protein 6.4 6.0 - 8.2 g/dL Final     Globulin 2.7 1.9 - 3.5 g/dL Final     A-G Ratio 1.4 g/dL Final       Narrative      Request patient fasting?->No  W/reflex to titer if positive            ESTIMATED GFR (Order [id number]) - Reflex for Order [id number]         Component Results      Component Value Ref Range & Units Status     GFR If  15 (A) >60 mL/min/1.73 m 2 Final     GFR If Non  13 (A) >60 mL/min/1.73 m 2 Final              Assessment/Plan:     50 y/o with recent sepsis presenting very similarly mulitple electrolyte abnormalities, symptomatic with lightheadedness and weakness... hypotensive, tachycardic, Sent to ER via ambulance to [facility], Dr. Oreilly accepting.    Essential hypertension routinely stable on prinivil  SLE/psoriatic arthritis maintained on humira, prednisone, plaquenil  1. Hyponatremia     2. Hypokalemia     3. Hypochloremia     4. Anemia, unspecified type     5. Hypotension, unspecified     6. Tachycardia     7. H/O septic shock     8. Renal dysfunction         PCP's office Dr. Mcknight called to verify  patient's disposition

## 2017-08-04 NOTE — TELEPHONE ENCOUNTER
Called pt. And relayed your message. Patient stated that she will not be able to go to the ER until Monday because she has no ride there. Advised patient to hold her Humira. Thank you!

## 2017-08-04 NOTE — IP AVS SNAPSHOT
[employer] Access Code: [email or web address]  Expires: 9/3/2017 12:08 PM    Your email address is not on file at [facility].  Email Addresses are required for you to sign up for [employer], please contact [phone] to verify your personal information and to provide your email address prior to attempting to register for [employer] [address]    [employer]  A secure, online tool to manage your health information     [facility]’s [employer]® is a secure, online tool that connects you to your personalized health information from the privacy of your home -- day or night - making it very easy for you to manage your healthcare. Once the activation process is completed, you can even access your medical information using the [employer] aniket, which is available for free in the Apple Aniket store or Google Play store.     To learn more about [employer], visit [email or web address]    There are two levels of access available (as shown below):   My Chart Features  [facility] Primary Care Doctor [facility]  Specialists [facility]  Urgent  Care Non-[facility] Primary Care Doctor   Email your healthcare team securely and privately 24/7 X X X    Manage appointments: schedule your next appointment; view details of past/upcoming appointments X      Request prescription refills. X      View recent personal medical records, including lab and immunizations X X X X   View health record, including health history, allergies, medications X X X X   Read reports about your outpatient visits, procedures, consult and ER notes X X X X   See your discharge summary, which is a recap of your hospital and/or ER visit that includes your diagnosis, lab results, and care plan X X  X     How to register for [employer]:  Once your e-mail address has been verified, follow the following steps to sign up for [employer].     1. Go to  [email or web address]  2. Click on the Sign Up Now box, which takes you to the New Member Sign Up page. You will  need to provide the following information:  a. Enter your [employer] Access Code exactly as it appears at the top of this page. (You will not need to use this code after you’ve completed the sign-up process. If you do not sign up before the expiration date, you must request a new code.)   b. Enter your date of birth.   c. Enter your home email address.   d. Click Submit, and follow the next screen’s instructions.  3. Create a readeot ID. This will be your [employer] login ID and cannot be changed, so think of one that is secure and easy to remember.  4. Create a [employer] password. You can change your password at any time.  5. Enter your Password Reset Question and Answer. This can be used at a later time if you forget your password.   6. Enter your e-mail address. This allows you to receive e-mail notifications when new information is available in [employer].  7. Click Sign Up. You can now view your health information.    For assistance activating your [employer] account, call [phone]

## 2017-08-05 PROBLEM — E78.00 HYPERCHOLESTEROLEMIA: Chronic | Status: ACTIVE | Noted: 2017-08-05

## 2017-08-05 LAB
ABO GROUP BLD: NORMAL
ABO GROUP BLD: NORMAL
ALBUMIN SERPL BCP-MCNC: 3 G/DL (ref 3.2–4.9)
ALBUMIN/GLOB SERPL: 1.3 G/DL
ALP SERPL-CCNC: 190 U/L (ref 30–99)
ALT SERPL-CCNC: 19 U/L (ref 2–50)
ANION GAP SERPL CALC-SCNC: 13 MMOL/L (ref 0–11.9)
AST SERPL-CCNC: 64 U/L (ref 12–45)
BARCODED ABORH UBTYP: 5100
BARCODED ABORH UBTYP: 9500
BARCODED PRD CODE UBPRD: NORMAL
BARCODED PRD CODE UBPRD: NORMAL
BARCODED UNIT NUM UBUNT: NORMAL
BARCODED UNIT NUM UBUNT: NORMAL
BASOPHILS # BLD AUTO: 0.3 % (ref 0–1.8)
BASOPHILS # BLD: 0.01 K/UL (ref 0–0.12)
BILIRUB SERPL-MCNC: 1.2 MG/DL (ref 0.1–1.5)
BLD GP AB SCN SERPL QL: NORMAL
BUN SERPL-MCNC: 27 MG/DL (ref 8–22)
C DIFF DNA SPEC QL NAA+PROBE: NEGATIVE
C DIFF TOX A+B STL QL IA: NEGATIVE
C DIFF TOX GENS STL QL NAA+PROBE: NORMAL
CALCIUM SERPL-MCNC: 8.3 MG/DL (ref 8.5–10.5)
CHLORIDE SERPL-SCNC: 102 MMOL/L (ref 96–112)
CHOLEST SERPL-MCNC: 401 MG/DL (ref 100–199)
CO2 SERPL-SCNC: 17 MMOL/L (ref 20–33)
COMPONENT R 8504R: NORMAL
COMPONENT R 8504R: NORMAL
CREAT SERPL-MCNC: 2.3 MG/DL (ref 0.5–1.4)
DSDNA AB TITR SER CLIF: NORMAL {TITER}
EOSINOPHIL # BLD AUTO: 0 K/UL (ref 0–0.51)
EOSINOPHIL NFR BLD: 0 % (ref 0–6.9)
ERYTHROCYTE [DISTWIDTH] IN BLOOD BY AUTOMATED COUNT: 60 FL (ref 35.9–50)
ERYTHROCYTE [DISTWIDTH] IN BLOOD BY AUTOMATED COUNT: 61.5 FL (ref 35.9–50)
GFR SERPL CREATININE-BSD FRML MDRD: 22 ML/MIN/1.73 M 2
GLOBULIN SER CALC-MCNC: 2.3 G/DL (ref 1.9–3.5)
GLUCOSE SERPL-MCNC: 165 MG/DL (ref 65–99)
HCT VFR BLD AUTO: 21.1 % (ref 37–47)
HCT VFR BLD AUTO: 26.6 % (ref 37–47)
HDLC SERPL-MCNC: 33 MG/DL
HGB BLD-MCNC: 6.8 G/DL (ref 12–16)
HGB BLD-MCNC: 8.6 G/DL (ref 12–16)
IGA SERPL-MCNC: 430 MG/DL (ref 68–408)
IMM GRANULOCYTES # BLD AUTO: 0.18 K/UL (ref 0–0.11)
IMM GRANULOCYTES NFR BLD AUTO: 4.7 % (ref 0–0.9)
LDLC SERPL CALC-MCNC: ABNORMAL MG/DL
LYMPHOCYTES # BLD AUTO: 0.45 K/UL (ref 1–4.8)
LYMPHOCYTES NFR BLD: 11.8 % (ref 22–41)
MAGNESIUM SERPL-MCNC: 1.5 MG/DL (ref 1.5–2.5)
MCH RBC QN AUTO: 30 PG (ref 27–33)
MCH RBC QN AUTO: 30.1 PG (ref 27–33)
MCHC RBC AUTO-ENTMCNC: 32.2 G/DL (ref 33.6–35)
MCHC RBC AUTO-ENTMCNC: 32.3 G/DL (ref 33.6–35)
MCV RBC AUTO: 92.7 FL (ref 81.4–97.8)
MCV RBC AUTO: 93.4 FL (ref 81.4–97.8)
MONOCYTES # BLD AUTO: 0.46 K/UL (ref 0–0.85)
MONOCYTES NFR BLD AUTO: 12.1 % (ref 0–13.4)
NEUTROPHILS # BLD AUTO: 2.71 K/UL (ref 2–7.15)
NEUTROPHILS NFR BLD: 71.1 % (ref 44–72)
NRBC # BLD AUTO: 0 K/UL
NRBC BLD AUTO-RTO: 0 /100 WBC
PLATELET # BLD AUTO: 238 K/UL (ref 164–446)
PLATELET # BLD AUTO: 257 K/UL (ref 164–446)
PMV BLD AUTO: 9.8 FL (ref 9–12.9)
PMV BLD AUTO: 9.8 FL (ref 9–12.9)
POTASSIUM SERPL-SCNC: 3.6 MMOL/L (ref 3.6–5.5)
PRODUCT TYPE UPROD: NORMAL
PRODUCT TYPE UPROD: NORMAL
PROT SERPL-MCNC: 5.3 G/DL (ref 6–8.2)
RBC # BLD AUTO: 2.26 M/UL (ref 4.2–5.4)
RBC # BLD AUTO: 2.87 M/UL (ref 4.2–5.4)
RH BLD: NORMAL
SODIUM SERPL-SCNC: 132 MMOL/L (ref 135–145)
TRIGL SERPL-MCNC: 417 MG/DL (ref 0–149)
TTG IGA SER IA-ACNC: 1 U/ML (ref 0–3)
UNIT STATUS USTAT: NORMAL
UNIT STATUS USTAT: NORMAL
WBC # BLD AUTO: 3.8 K/UL (ref 4.8–10.8)
WBC # BLD AUTO: 5.5 K/UL (ref 4.8–10.8)

## 2017-08-05 PROCEDURE — 80053 COMPREHEN METABOLIC PANEL: CPT

## 2017-08-05 PROCEDURE — 36430 TRANSFUSION BLD/BLD COMPNT: CPT

## 2017-08-05 PROCEDURE — A9270 NON-COVERED ITEM OR SERVICE: HCPCS | Performed by: INTERNAL MEDICINE

## 2017-08-05 PROCEDURE — 700102 HCHG RX REV CODE 250 W/ 637 OVERRIDE(OP): Performed by: NURSE PRACTITIONER

## 2017-08-05 PROCEDURE — 36415 COLL VENOUS BLD VENIPUNCTURE: CPT

## 2017-08-05 PROCEDURE — 700105 HCHG RX REV CODE 258: Performed by: HOSPITALIST

## 2017-08-05 PROCEDURE — 87493 C DIFF AMPLIFIED PROBE: CPT

## 2017-08-05 PROCEDURE — P9016 RBC LEUKOCYTES REDUCED: HCPCS

## 2017-08-05 PROCEDURE — 86850 RBC ANTIBODY SCREEN: CPT

## 2017-08-05 PROCEDURE — 86923 COMPATIBILITY TEST ELECTRIC: CPT

## 2017-08-05 PROCEDURE — 87324 CLOSTRIDIUM AG IA: CPT

## 2017-08-05 PROCEDURE — 700105 HCHG RX REV CODE 258

## 2017-08-05 PROCEDURE — 30233N1 TRANSFUSION OF NONAUTOLOGOUS RED BLOOD CELLS INTO PERIPHERAL VEIN, PERCUTANEOUS APPROACH: ICD-10-PCS | Performed by: INTERNAL MEDICINE

## 2017-08-05 PROCEDURE — 700111 HCHG RX REV CODE 636 W/ 250 OVERRIDE (IP): Performed by: INTERNAL MEDICINE

## 2017-08-05 PROCEDURE — 700102 HCHG RX REV CODE 250 W/ 637 OVERRIDE(OP): Performed by: INTERNAL MEDICINE

## 2017-08-05 PROCEDURE — 86900 BLOOD TYPING SEROLOGIC ABO: CPT

## 2017-08-05 PROCEDURE — 99233 SBSQ HOSP IP/OBS HIGH 50: CPT | Performed by: HOSPITALIST

## 2017-08-05 PROCEDURE — 770020 HCHG ROOM/CARE - TELE (206)

## 2017-08-05 PROCEDURE — 80061 LIPID PANEL: CPT

## 2017-08-05 PROCEDURE — 85027 COMPLETE CBC AUTOMATED: CPT

## 2017-08-05 PROCEDURE — 86901 BLOOD TYPING SEROLOGIC RH(D): CPT

## 2017-08-05 PROCEDURE — 85025 COMPLETE CBC W/AUTO DIFF WBC: CPT

## 2017-08-05 PROCEDURE — 700102 HCHG RX REV CODE 250 W/ 637 OVERRIDE(OP): Performed by: HOSPITALIST

## 2017-08-05 PROCEDURE — A9270 NON-COVERED ITEM OR SERVICE: HCPCS | Performed by: HOSPITALIST

## 2017-08-05 PROCEDURE — 83735 ASSAY OF MAGNESIUM: CPT

## 2017-08-05 PROCEDURE — A9270 NON-COVERED ITEM OR SERVICE: HCPCS | Performed by: NURSE PRACTITIONER

## 2017-08-05 RX ORDER — ATORVASTATIN CALCIUM 80 MG/1
80 TABLET, FILM COATED ORAL
Status: DISCONTINUED | OUTPATIENT
Start: 2017-08-05 | End: 2017-08-09 | Stop reason: HOSPADM

## 2017-08-05 RX ORDER — HYDROXYCHLOROQUINE SULFATE 200 MG/1
200 TABLET, FILM COATED ORAL 2 TIMES DAILY
Status: DISCONTINUED | OUTPATIENT
Start: 2017-08-05 | End: 2017-08-09 | Stop reason: HOSPADM

## 2017-08-05 RX ORDER — SODIUM CHLORIDE 9 MG/ML
INJECTION, SOLUTION INTRAVENOUS
Status: COMPLETED
Start: 2017-08-05 | End: 2017-08-05

## 2017-08-05 RX ORDER — OXYCODONE HYDROCHLORIDE 10 MG/1
10 TABLET ORAL EVERY 4 HOURS PRN
Status: DISCONTINUED | OUTPATIENT
Start: 2017-08-05 | End: 2017-08-07

## 2017-08-05 RX ADMIN — FOLIC ACID 1 MG: 1 TABLET ORAL at 09:03

## 2017-08-05 RX ADMIN — SODIUM CHLORIDE 500 ML: 9 INJECTION, SOLUTION INTRAVENOUS at 12:00

## 2017-08-05 RX ADMIN — OXYBUTYNIN CHLORIDE 5 MG: 5 TABLET ORAL at 20:54

## 2017-08-05 RX ADMIN — OXYBUTYNIN CHLORIDE 5 MG: 5 TABLET ORAL at 09:03

## 2017-08-05 RX ADMIN — HYDROXYCHLOROQUINE SULFATE 200 MG: 200 TABLET, FILM COATED ORAL at 20:54

## 2017-08-05 RX ADMIN — OXYCODONE HYDROCHLORIDE 10 MG: 10 TABLET ORAL at 00:18

## 2017-08-05 RX ADMIN — PREDNISONE 15 MG: 10 TABLET ORAL at 10:03

## 2017-08-05 RX ADMIN — SODIUM CHLORIDE: 9 INJECTION, SOLUTION INTRAVENOUS at 20:53

## 2017-08-05 RX ADMIN — OMEPRAZOLE 20 MG: 20 CAPSULE, DELAYED RELEASE ORAL at 09:03

## 2017-08-05 RX ADMIN — SODIUM CHLORIDE: 9 INJECTION, SOLUTION INTRAVENOUS at 10:06

## 2017-08-05 RX ADMIN — ONDANSETRON 4 MG: 2 INJECTION INTRAMUSCULAR; INTRAVENOUS at 00:18

## 2017-08-05 RX ADMIN — ATORVASTATIN CALCIUM 80 MG: 80 TABLET, FILM COATED ORAL at 20:54

## 2017-08-05 RX ADMIN — HYDROXYCHLOROQUINE SULFATE 200 MG: 200 TABLET, FILM COATED ORAL at 10:03

## 2017-08-05 RX ADMIN — OXYCODONE HYDROCHLORIDE 10 MG: 10 TABLET ORAL at 23:21

## 2017-08-05 ASSESSMENT — ENCOUNTER SYMPTOMS
SHORTNESS OF BREATH: 0
CONSTIPATION: 0
ABDOMINAL PAIN: 0
DIZZINESS: 0
FEVER: 0
COUGH: 0
DIARRHEA: 1
VOMITING: 0
CHILLS: 0
HEADACHES: 0
NAUSEA: 0
MYALGIAS: 0
FOCAL WEAKNESS: 0
WEAKNESS: 1
PALPITATIONS: 0

## 2017-08-05 ASSESSMENT — PAIN SCALES - GENERAL
PAINLEVEL_OUTOF10: 6
PAINLEVEL_OUTOF10: 8
PAINLEVEL_OUTOF10: 6

## 2017-08-05 NOTE — CARE PLAN
Problem: Safety  Goal: Will remain free from injury  Outcome: PROGRESSING AS EXPECTED  Patient independent and steady on feet. Pt v/u to call for assistance d/t low h/h    Problem: Bowel/Gastric:  Goal: Normal bowel function is maintained or improved  Outcome: PROGRESSING SLOWER THAN EXPECTED  Pt having loose stools. Waiting for r/o c-diff.

## 2017-08-05 NOTE — ASSESSMENT & PLAN NOTE
SBP goal less than 140 mmHg  DBP goal less than 90 mmHg  PRN and antihypertensives to titrate by hospitalist towards goal  Most recent Blood Pressure: 116/74 mmHg

## 2017-08-05 NOTE — PROGRESS NOTES
Patient educated and given handout on blood transfusion. Patient denies any questions. Blood verified at bedside with 2nd RN. IV patent and pre admin vitals taken. Will closely monitor bedside for 15 min.

## 2017-08-05 NOTE — PROGRESS NOTES
Received report from Giovani. Pt. Transferred to floor. Tele box on. Monitor room notified. Vital signs stable. All safety measures in place

## 2017-08-05 NOTE — ED NOTES
Attempted to call report, phone extension left with unit clerk. Pt transported to T825, all belongings w/ pt.

## 2017-08-05 NOTE — PROGRESS NOTES
Assumed care of patient at this time. Pt resting comfortably in bed. Denies needs at this time. Tele and IV pole in place appropriately. Pt on contact precautions for r/o cdiff.

## 2017-08-05 NOTE — PROGRESS NOTES
No reaction noted after first 15 minutes of blood transfusion. Pt v/u of s/s of reaction. Will continue to monitor closely.

## 2017-08-05 NOTE — PROGRESS NOTES
Sherlyn Fuller Fall Risk Assessment:     Last Known Fall: No falls  Mobility: No limitations  Medications: Cardiovascular or central nervous system meds  Mental Status/LOC/Awareness: Awake, alert, and oriented to date, place, and person  Toileting Needs: No needs  Volume/Electrolyte Status: Use of IV fluids/tube feeds  Communication/Sensory: Visual (Glasses)/hearing deficit  Behavior: Appropriate behavior  Sherlyn Fluler Fall Risk Total: 6  Fall Risk Level: NO RISK    Universal Fall Precautions:  call light/belongings in reach, bed in low position and locked, wheelchairs and assistive devices out of sight, siderails up x 2, use non-slip footwear, adequate lighting, clutter free and spill free environment, educate on level of risk, educate to call for assistance    Fall Risk Level Interventions:          Patient Specific Interventions:     Medication: review medications with patient and family and limit combination of prn medications  Mental Status/LOC/Awareness: reinforce falls education, check on patient hourly and reinforce the use of call light  Toileting: not applicable  Volume/Electrolyte Status: monitor abnormal lab values and ensure IV fluids are appropriate  Communication/Sensory: update plan of care on whiteboard, ensure patient has glasses/contacts and hearing aids/dentures and for visually impaired patients orient to their room surrounding and do not change their surroundings  Behavioral: not applicable  Mobility: provide comfort measures during transport, ensure bed is locked and in lowest position and instruct patient to exit bed on their strongest side

## 2017-08-05 NOTE — H&P
Hospital Medicine History and Physical    Date of Service  8/4/2017    Chief Complaint  Chief Complaint   Patient presents with   • Abnormal Labs   • Weakness   • Dizziness       History of Presenting Illness  51 y.o. female who presented generalized weakness and malaise.  Pt with acute renal failure and hypokalemia. She denies any new medications. Pt reports decreased appetite, no flank pain no high fevers or chills. The patient has had decreased urine output .      H/o lupus, RA chronically immunosuppressed on both steroids as well as bimonthly injectable and methotrexate.    On exam, pt is eating crackers, reports decreased fluid intake.   Recent admission for similar with ARF which resolved.  Also diagnosed with Cdiff s/p treatment.    Reports min diarrhea and abdominal pain.    Primary Care Physician  Jasmyne Soto M.D.    Consultants  None    Code Status  Full    Review of Systems  Review of Systems   Constitutional: Negative for fever and chills.   HENT: Negative for congestion and hearing loss.    Respiratory: Negative for cough and shortness of breath.    Cardiovascular: Positive for leg swelling. Negative for chest pain and palpitations.   Gastrointestinal: Positive for abdominal pain and diarrhea. Negative for nausea and vomiting.   Genitourinary: Negative for dysuria and flank pain.   Musculoskeletal: Positive for myalgias. Negative for back pain and joint pain.   Neurological: Positive for weakness. Negative for dizziness, sensory change, speech change, focal weakness and headaches.   Psychiatric/Behavioral: Negative for depression and memory loss. The patient is not nervous/anxious.           Past Medical History  Past Medical History   Diagnosis Date   • Lupus (CMS-[facility])    • Fibroids    • SLE (systemic lupus erythematosus) (CMS-[facility])    • Psoriatic arthritis (CMS-HCC)    • Pancreatitis    • Arthritis        Surgical History  Past Surgical History   Procedure Laterality Date   • Gyn surgery  8/2013      Hysteroscopy, D and C   • Colonoscopy N/A 2017     Procedure: COLONOSCOPY;  Surgeon: Enrique Canseco M.D.;  Location: SURGERY [facility];  Service:    • Gastroscopy with biopsy N/A 2017     Procedure: GASTROSCOPY WITH BIOPSY;  Surgeon: Enrique Canseco M.D.;  Location: SURGERY [facility];  Service:        Medications  No current facility-administered medications on file prior to encounter.     Current Outpatient Prescriptions on File Prior to Encounter   Medication Sig Dispense Refill   • Adalimumab 40 MG/0.8ML Pen-injector Kit Inject 0.8 mL as instructed every 14 days. 2 Each 2   • hydroxychloroquine (PLAQUENIL) 200 MG Tab Take 1 Tab by mouth 2 times a day. 60 Tab 2   • omeprazole (PRILOSEC) 20 MG delayed-release capsule Take 1 Cap by mouth every day. 30 Cap 3   • lisinopril (PRINIVIL) 20 MG Tab Take 20 mg by mouth every day.     • allopurinol (ZYLOPRIM) 300 MG Tab Take 1 Tab by mouth every day. 30 Tab 0   • ergocalciferol (DRISDOL) 43838 UNIT capsule Take 1 cap once weekly for 12 weeks. 12 Cap 0   • gemfibrozil (LOPID) 600 MG Tab Take 600 mg by mouth 2 times a day.     • oxybutynin (DITROPAN) 5 MG TABS Take 5 mg by mouth 2 Times a Day.         Family History  Family History   Problem Relation Age of Onset   • Diabetes Mother    • Hypertension Mother    • Cancer Mother    • Diabetes Father    • Cancer Father        Social History  Social History   Substance Use Topics   • Smoking status: Never Smoker    • Smokeless tobacco: Never Used   • Alcohol Use: No       Allergies  Allergies   Allergen Reactions   • Codeine         Physical Exam  Laboratory   Hemodynamics  Temp (24hrs), Av.1 °C (97 °F), Min:36.1 °C (97 °F), Max:36.1 °C (97 °F)   Temperature: 36.1 °C (97 °F)  Pulse  Av.2  Min: 85  Max: 114 Heart Rate (Monitored): (!) 112  Blood Pressure: (!) 91/58 mmHg, NIBP: (!) 97/63 mmHg      Respiratory      Respiration: 16, Pulse Oximetry: 99 %             Physical Exam   Constitutional:  She is oriented to person, place, and time. She appears well-nourished. No distress.   obese   HENT:   Head: Normocephalic and atraumatic.   Nose: Nose normal.   Eyes: EOM are normal. Pupils are equal, round, and reactive to light. Right eye exhibits no discharge. Left eye exhibits no discharge. No scleral icterus.   Neck: Neck supple. No JVD present. No thyromegaly present.   Cardiovascular: Normal rate and intact distal pulses.    No murmur heard.  Pulmonary/Chest: Breath sounds normal. No respiratory distress. She has no wheezes.   Abdominal: Soft. Bowel sounds are normal. She exhibits no distension. There is no tenderness.   Musculoskeletal: She exhibits edema. She exhibits no tenderness.   Neurological: She is alert and oriented to person, place, and time. No cranial nerve deficit. Coordination normal.   Skin: Skin is warm and dry. No rash noted. She is not diaphoretic. No erythema. There is pallor.   Psychiatric: She has a normal mood and affect. Her behavior is normal. Judgment and thought content normal.   Nursing note and vitals reviewed.      Recent Labs      08/03/17   0955  08/04/17   1208   WBC  8.1  4.7*   RBC  2.85*  2.53*   HEMOGLOBIN  8.4*  7.6*   HEMATOCRIT  27.1*  23.5*   MCV  95.1  92.9   MCH  29.5  30.0   MCHC  31.0*  32.3*   RDW  61.1*  61.0*   PLATELETCT  311  244   MPV  10.2  9.8     Recent Labs      08/03/17   0955  08/04/17   1208   SODIUM  125*  129*   POTASSIUM  2.7*  2.7*   CHLORIDE  89*  94*   CO2  14*  16*   GLUCOSE  110*  119*   BUN  29*  28*   CREATININE  3.79*  3.14*   CALCIUM  8.8  8.3*     Recent Labs      08/03/17   0955  08/04/17   1208   ALTSGPT  24  22   ASTSGOT  70*  72*   ALKPHOSPHAT  235*  228*   TBILIRUBIN  1.7*  1.6*   LIPASE   --   54   GLUCOSE  110*  119*                 No results found for: TROPONINI    Imaging  No acute disease    Assessment/Plan     I anticipate this patient will require at least two midnights for appropriate medical management, necessitating  inpatient admission.    * Sepsis (CMS-HCC)  Assessment & Plan  This is severe sepsis with the following associated acute organ dysfunction(s): acute kidney failure.   Unclear source patient does have a history of C. diff colitis in the past status post treatment now with improving diarrhea we will rule out C. diff patient will need to be on isolation.  Continue IV fluid hydration status post fluid bolus. We'll place on septic protocol    Diarrhea  Assessment & Plan  Rule out C. diff  Still culture    ARF (acute renal failure) (CMS-HCC)  Assessment & Plan  Likely secondary to severe dehydration we will continue IV fluid hydration patient was seen by Dr. lerma consult nephrology as needed monitor for improvement    SLE (systemic lupus erythematosus) (CMS-HCC) (present on admission)  Assessment & Plan  Monitor   Home dosing of prednisone prior history of chronic prednisone use status post hydrocortisone in the ER    Chronic rheumatic arthritis (CMS-HCC) (present on admission)  Assessment & Plan  Pain control    Immunosuppressed status (CMS-HCC)  Assessment & Plan  Chronic patient is on methotrexate as well as chronic steroids    Polyarthralgia (present on admission)  Assessment & Plan  History of    HTN (hypertension) (present on admission)  Assessment & Plan  Monitored    Hyponatremia  Assessment & Plan  Continue IV fluid hydration and monitor    Hypokalemia  Assessment & Plan  Continue potassium supplementation monitor        VTE prophylaxis: SCD .

## 2017-08-05 NOTE — PROGRESS NOTES
2 RN skin check completed with VENKATA Del Castillo     Redness to fingers  Bilateral heels dry/f;aky/calloused   L heel cracking   All other skin intact

## 2017-08-05 NOTE — ASSESSMENT & PLAN NOTE
Resolved  Goal > 4 mEq/L  Lab Results   Component Value Date/Time    POTASSIUM 4.0 08/07/2017 01:46 AM

## 2017-08-05 NOTE — PROGRESS NOTES
[facility]    Date of Service: 2017    Chief Complaint  51 y.o. female admitted 2017 with SLE and dehydration with recent c diff    Interval Problem Update   - still having loose stool but SCr markedly improved. Questions answered. She feels generally okay, a little better than yesterday. Not having much appetite.     Consultants/Specialty  None    Disposition  Clinical         Review of Systems   Constitutional: Positive for malaise/fatigue. Negative for fever and chills.   Respiratory: Negative for cough and shortness of breath.    Cardiovascular: Negative for chest pain and palpitations.   Gastrointestinal: Positive for diarrhea. Negative for nausea, vomiting, abdominal pain and constipation.   Genitourinary: Negative for dysuria.   Musculoskeletal: Negative for myalgias.   Skin: Negative for itching.   Neurological: Positive for weakness. Negative for dizziness, focal weakness and headaches.   All other systems reviewed and are negative.     Physical Exam  Laboratory/Imaging   Hemodynamics  Temp (24hrs), Av.3 °C (97.3 °F), Min:36.1 °C (97 °F), Max:36.6 °C (97.9 °F)   Temperature: 36.6 °C (97.9 °F)  Pulse  Av.6  Min: 64  Max: 114 Heart Rate (Monitored): (!) 112  Blood Pressure: 107/86 mmHg, NIBP: 103/63 mmHg      Respiratory      Respiration: 18, Pulse Oximetry: 99 %, O2 Daily Delivery Respiratory : Room Air with O2 Available        RUL Breath Sounds: Clear;Diminished, RML Breath Sounds: Clear;Diminished, RLL Breath Sounds: Clear;Diminished, ARNOL Breath Sounds: Clear;Diminished, LLL Breath Sounds: Clear;Diminished    Fluids  No intake or output data in the 24 hours ending 17 1250    Nutrition  Orders Placed This Encounter   Procedures   • Diet Order     Standing Status: Standing      Number of Occurrences: 1      Standing Expiration Date:      Order Specific Question:  Diet:     Answer:  Low Fiber(GI Soft) [2]     Physical Exam   Constitutional: She is oriented to  person, place, and time. She appears well-developed and well-nourished.   HENT:   Head: Normocephalic and atraumatic.   Mouth/Throat: Oropharynx is clear and moist.   Eyes: Conjunctivae and EOM are normal. Pupils are equal, round, and reactive to light. No scleral icterus.   Neck: Normal range of motion. Neck supple. No tracheal deviation present. No thyromegaly present.   Cardiovascular: Normal rate, regular rhythm, normal heart sounds and intact distal pulses.    No murmur heard.  Pulmonary/Chest: Effort normal and breath sounds normal. No respiratory distress. She has no wheezes.   Abdominal: Soft. Bowel sounds are normal. She exhibits no distension. There is no tenderness.   Musculoskeletal: Normal range of motion. She exhibits no edema or tenderness.   Generally weak   Lymphadenopathy:     She has no cervical adenopathy.        Right: No supraclavicular adenopathy present.        Left: No supraclavicular adenopathy present.   Neurological: She is alert and oriented to person, place, and time.   Skin: Skin is warm and dry.   Vitals reviewed.      Recent Labs      08/03/17   0955 08/04/17   1208  08/05/17   0248   WBC  8.1  4.7*  3.8*   RBC  2.85*  2.53*  2.26*   HEMOGLOBIN  8.4*  7.6*  6.8*   HEMATOCRIT  27.1*  23.5*  21.1*   MCV  95.1  92.9  93.4   MCH  29.5  30.0  30.1   MCHC  31.0*  32.3*  32.2*   RDW  61.1*  61.0*  60.0*   PLATELETCT  311  244  238   MPV  10.2  9.8  9.8     Recent Labs      08/03/17   0955  08/04/17   1208  08/05/17   0248   SODIUM  125*  129*  132*   POTASSIUM  2.7*  2.7*  3.6   CHLORIDE  89*  94*  102   CO2  14*  16*  17*   GLUCOSE  110*  119*  165*   BUN  29*  28*  27*   CREATININE  3.79*  3.14*  2.30*   CALCIUM  8.8  8.3*  8.3*             Recent Labs      08/05/17   0248   TRIGLYCERIDE  417*   HDL  33*   LDL  see below          Assessment/Plan     * Sepsis (CMS-HCC) (present on admission)  Assessment & Plan  Might just be dehydration but could also be c diff. PCR/Toxin  pending    Diarrhea (present on admission)  Assessment & Plan  Checkign c diff    ARF (acute renal failure) (CMS-HCC) (present on admission)  Assessment & Plan  Severe dehydration  Improving  Lab Results   Component Value Date    BUN 27* 08/05/2017    CREATININE 2.30* 08/05/2017   Baseline Cr 0.9 or so  Maintaining hydration  Avoiding nephrotoxics: NSAIDs etc  Following Cr closely; to keep near baseline as possible     SLE (systemic lupus erythematosus) (CMS-HCC) (present on admission)  Assessment & Plan  On plaquenil and steroid    Chronic rheumatic arthritis (CMS-HCC) (present on admission)  Assessment & Plan  Continuing home meds    Immunosuppressed status (CMS-HCC) (present on admission)  Assessment & Plan  Due to RA    Hypercholesterolemia (present on admission)  Assessment & Plan  Started statin given ? ?  lipids    HTN (hypertension) (present on admission)  Assessment & Plan  SBP goal less than 140 mmHg  DBP goal less than 90 mmHg  PRN and antihypertensives to titrate by hospitalist towards goal  Most recent Blood Pressure: 107/86 mmHg     Hyponatremia (present on admission)  Assessment & Plan  Hypovolemic hyponatremia  Infusing IVF: NS with 154 mEq/L  Will recheck  Goal > 135 mEq/L  Lab Results   Component Value Date/Time    SODIUM 132* 08/05/2017 02:48 AM       Hypokalemia (present on admission)  Assessment & Plan  Potassium supplementation, oral preferred  Expect increase of 0.1 mEq/L per each 10 mEq given  Will recheck after supplementation  Goal > 4 mEq/L  Lab Results   Component Value Date/Time    POTASSIUM 3.6 08/05/2017 02:48 AM     Core Measures

## 2017-08-05 NOTE — CARE PLAN
Problem: Knowledge Deficit  Goal: Knowledge of disease process/condition, treatment plan, diagnostic tests, and medications will improve  Outcome: PROGRESSING AS EXPECTED  Pt educated regarding plan of care for the night, activity, diet, and medications. Verbalized understanding.

## 2017-08-05 NOTE — ASSESSMENT & PLAN NOTE
Virtually resolved   Lab Results   Component Value Date    BUN 17 08/07/2017    CREATININE 1.18 08/07/2017   Baseline Cr 0.9 or so  Maintaining hydration  Avoiding nephrotoxics: NSAIDs etc  Following Cr closely; to keep near baseline as possible

## 2017-08-06 PROBLEM — D64.9 ANEMIA: Chronic | Status: ACTIVE | Noted: 2017-08-06

## 2017-08-06 LAB
ANION GAP SERPL CALC-SCNC: 10 MMOL/L (ref 0–11.9)
BUN SERPL-MCNC: 21 MG/DL (ref 8–22)
CALCIUM SERPL-MCNC: 7.6 MG/DL (ref 8.5–10.5)
CHLORIDE SERPL-SCNC: 109 MMOL/L (ref 96–112)
CO2 SERPL-SCNC: 18 MMOL/L (ref 20–33)
CREAT SERPL-MCNC: 1.6 MG/DL (ref 0.5–1.4)
ERYTHROCYTE [DISTWIDTH] IN BLOOD BY AUTOMATED COUNT: 63.6 FL (ref 35.9–50)
ERYTHROCYTE [DISTWIDTH] IN BLOOD BY AUTOMATED COUNT: 65.7 FL (ref 35.9–50)
GFR SERPL CREATININE-BSD FRML MDRD: 34 ML/MIN/1.73 M 2
GLUCOSE SERPL-MCNC: 112 MG/DL (ref 65–99)
HCT VFR BLD AUTO: 21.9 % (ref 37–47)
HCT VFR BLD AUTO: 23.5 % (ref 37–47)
HEMOCCULT STL QL: NEGATIVE
HGB BLD-MCNC: 7.1 G/DL (ref 12–16)
HGB BLD-MCNC: 7.8 G/DL (ref 12–16)
LDH SERPL-CCNC: 254 U/L (ref 107–266)
MCH RBC QN AUTO: 29.8 PG (ref 27–33)
MCH RBC QN AUTO: 31 PG (ref 27–33)
MCHC RBC AUTO-ENTMCNC: 32.4 G/DL (ref 33.6–35)
MCHC RBC AUTO-ENTMCNC: 33.2 G/DL (ref 33.6–35)
MCV RBC AUTO: 92 FL (ref 81.4–97.8)
MCV RBC AUTO: 93.3 FL (ref 81.4–97.8)
PLATELET # BLD AUTO: 236 K/UL (ref 164–446)
PLATELET # BLD AUTO: 240 K/UL (ref 164–446)
PMV BLD AUTO: 9.3 FL (ref 9–12.9)
PMV BLD AUTO: 9.8 FL (ref 9–12.9)
POTASSIUM SERPL-SCNC: 3.2 MMOL/L (ref 3.6–5.5)
RBC # BLD AUTO: 2.38 M/UL (ref 4.2–5.4)
RBC # BLD AUTO: 2.52 M/UL (ref 4.2–5.4)
SODIUM SERPL-SCNC: 137 MMOL/L (ref 135–145)
WBC # BLD AUTO: 5.6 K/UL (ref 4.8–10.8)
WBC # BLD AUTO: 5.9 K/UL (ref 4.8–10.8)

## 2017-08-06 PROCEDURE — A9270 NON-COVERED ITEM OR SERVICE: HCPCS | Performed by: INTERNAL MEDICINE

## 2017-08-06 PROCEDURE — 85027 COMPLETE CBC AUTOMATED: CPT | Mod: 91

## 2017-08-06 PROCEDURE — 99233 SBSQ HOSP IP/OBS HIGH 50: CPT | Performed by: HOSPITALIST

## 2017-08-06 PROCEDURE — 80048 BASIC METABOLIC PNL TOTAL CA: CPT

## 2017-08-06 PROCEDURE — 700105 HCHG RX REV CODE 258: Performed by: HOSPITALIST

## 2017-08-06 PROCEDURE — 700102 HCHG RX REV CODE 250 W/ 637 OVERRIDE(OP): Performed by: INTERNAL MEDICINE

## 2017-08-06 PROCEDURE — 83615 LACTATE (LD) (LDH) ENZYME: CPT

## 2017-08-06 PROCEDURE — A9270 NON-COVERED ITEM OR SERVICE: HCPCS | Performed by: HOSPITALIST

## 2017-08-06 PROCEDURE — 700102 HCHG RX REV CODE 250 W/ 637 OVERRIDE(OP): Performed by: HOSPITALIST

## 2017-08-06 PROCEDURE — 82272 OCCULT BLD FECES 1-3 TESTS: CPT

## 2017-08-06 PROCEDURE — A9270 NON-COVERED ITEM OR SERVICE: HCPCS | Performed by: NURSE PRACTITIONER

## 2017-08-06 PROCEDURE — 700111 HCHG RX REV CODE 636 W/ 250 OVERRIDE (IP): Performed by: INTERNAL MEDICINE

## 2017-08-06 PROCEDURE — 36415 COLL VENOUS BLD VENIPUNCTURE: CPT

## 2017-08-06 PROCEDURE — 83010 ASSAY OF HAPTOGLOBIN QUANT: CPT

## 2017-08-06 PROCEDURE — 700102 HCHG RX REV CODE 250 W/ 637 OVERRIDE(OP): Performed by: NURSE PRACTITIONER

## 2017-08-06 PROCEDURE — 770020 HCHG ROOM/CARE - TELE (206)

## 2017-08-06 RX ORDER — POTASSIUM CHLORIDE 20 MEQ/1
60 TABLET, EXTENDED RELEASE ORAL ONCE
Status: COMPLETED | OUTPATIENT
Start: 2017-08-06 | End: 2017-08-06

## 2017-08-06 RX ADMIN — OXYCODONE HYDROCHLORIDE 10 MG: 10 TABLET ORAL at 22:07

## 2017-08-06 RX ADMIN — OXYBUTYNIN CHLORIDE 5 MG: 5 TABLET ORAL at 20:06

## 2017-08-06 RX ADMIN — PREDNISONE 15 MG: 10 TABLET ORAL at 12:00

## 2017-08-06 RX ADMIN — HYDROXYCHLOROQUINE SULFATE 200 MG: 200 TABLET, FILM COATED ORAL at 20:06

## 2017-08-06 RX ADMIN — ATORVASTATIN CALCIUM 80 MG: 80 TABLET, FILM COATED ORAL at 20:06

## 2017-08-06 RX ADMIN — OXYBUTYNIN CHLORIDE 5 MG: 5 TABLET ORAL at 07:51

## 2017-08-06 RX ADMIN — STANDARDIZED SENNA CONCENTRATE AND DOCUSATE SODIUM 2 TABLET: 8.6; 5 TABLET, FILM COATED ORAL at 07:51

## 2017-08-06 RX ADMIN — POTASSIUM CHLORIDE 60 MEQ: 1500 TABLET, EXTENDED RELEASE ORAL at 12:00

## 2017-08-06 RX ADMIN — HYDROXYCHLOROQUINE SULFATE 200 MG: 200 TABLET, FILM COATED ORAL at 07:51

## 2017-08-06 RX ADMIN — FOLIC ACID 1 MG: 1 TABLET ORAL at 07:50

## 2017-08-06 RX ADMIN — SODIUM CHLORIDE: 9 INJECTION, SOLUTION INTRAVENOUS at 12:07

## 2017-08-06 RX ADMIN — SODIUM CHLORIDE: 9 INJECTION, SOLUTION INTRAVENOUS at 20:07

## 2017-08-06 RX ADMIN — OMEPRAZOLE 20 MG: 20 CAPSULE, DELAYED RELEASE ORAL at 07:50

## 2017-08-06 RX ADMIN — SODIUM CHLORIDE: 9 INJECTION, SOLUTION INTRAVENOUS at 04:44

## 2017-08-06 ASSESSMENT — ENCOUNTER SYMPTOMS
FOCAL WEAKNESS: 0
FEVER: 0
ABDOMINAL PAIN: 0
SHORTNESS OF BREATH: 0
VOMITING: 0
NAUSEA: 0
CHILLS: 0
DIZZINESS: 0
MYALGIAS: 0
COUGH: 0
HEADACHES: 0
CONSTIPATION: 0
PALPITATIONS: 0
WEAKNESS: 1
DIARRHEA: 1

## 2017-08-06 ASSESSMENT — PAIN SCALES - GENERAL
PAINLEVEL_OUTOF10: 7
PAINLEVEL_OUTOF10: 0
PAINLEVEL_OUTOF10: 0

## 2017-08-06 NOTE — CARE PLAN
Problem: Pain Management  Goal: Pain level will decrease to patient’s comfort goal  Outcome: PROGRESSING AS EXPECTED  Pt assessed for pain Q4h and medicated PRN. Pt instructed to notify RN of any new or increasing pain to prevent it from becoming intolerable. Verbalized understanding.

## 2017-08-06 NOTE — PROGRESS NOTES
Assumed care at 1915. Bedside report received from Day RN Magdalene. Patient's chart and MAR reviewed. 12 hour chart check complete. Pt is awake in bed. Patient was updated on plan of care for the night. Questions answered and concerns addressed.  Pt denies any additional needs at this time. White board updated. Call light, phone and personal belongings within reach. Vital signs stable

## 2017-08-06 NOTE — ASSESSMENT & PLAN NOTE
s/p  Transfused 2 unit PRBCs  GI- Dr. Boone, colonoscopy with AVM , s/p lesion ablation  F/u h/h  Transfuse prn

## 2017-08-06 NOTE — PROGRESS NOTES
[facility] Hospitalist Progress Note    Date of Service: 2017    Chief Complaint  51 y.o. female admitted 2017 with SLE and dehydration with recent c diff    Interval Problem Update   - transfused but Hgb still trending down. She denies seeing any blood in her stool but she tells me she had ulcers previously and lost blood that way. She had previously seen Dr. Canseco. Consulted Dr. Boone. Questions answered     - still having loose stool but SCr markedly improved. Questions answered. She feels generally okay, a little better than yesterday. Not having much appetite.     Consultants/Specialty  Paolo, GI    Disposition  Clinical         Review of Systems   Constitutional: Positive for malaise/fatigue. Negative for fever and chills.   Respiratory: Negative for cough and shortness of breath.    Cardiovascular: Negative for chest pain and palpitations.   Gastrointestinal: Positive for diarrhea. Negative for nausea, vomiting, abdominal pain and constipation.   Genitourinary: Negative for dysuria.   Musculoskeletal: Negative for myalgias.   Skin: Negative for itching.   Neurological: Positive for weakness. Negative for dizziness, focal weakness and headaches.   All other systems reviewed and are negative.     Physical Exam  Laboratory/Imaging   Hemodynamics  Temp (24hrs), Av.3 °C (97.4 °F), Min:36.1 °C (97 °F), Max:36.7 °C (98.1 °F)   Temperature: 36.1 °C (97 °F)  Pulse  Av.1  Min: 64  Max: 114   Blood Pressure: 121/74 mmHg      Respiratory      Respiration: 16, Pulse Oximetry: 93 %        RUL Breath Sounds: Clear, RML Breath Sounds: Clear, RLL Breath Sounds: Diminished, ARNOL Breath Sounds: Clear, LLL Breath Sounds: Diminished    Fluids    Intake/Output Summary (Last 24 hours) at 17 1117  Last data filed at 17 0600   Gross per 24 hour   Intake   2150 ml   Output      0 ml   Net   2150 ml       Nutrition  Orders Placed This Encounter   Procedures   • Diet Order     Standing Status:  Standing      Number of Occurrences: 1      Standing Expiration Date:      Order Specific Question:  Diet:     Answer:  Low Fiber(GI Soft) [2]     Physical Exam   Constitutional: She is oriented to person, place, and time. She appears well-developed and well-nourished. No distress.   HENT:   Head: Normocephalic and atraumatic.   Eyes: Conjunctivae are normal. Pupils are equal, round, and reactive to light. No scleral icterus.   Neck: Normal range of motion. Neck supple.   Cardiovascular: Normal rate, regular rhythm, normal heart sounds and intact distal pulses.    No murmur heard.  Pulmonary/Chest: Effort normal and breath sounds normal. No respiratory distress. She has no rales.   Abdominal: Soft. Bowel sounds are normal. She exhibits no distension.   Musculoskeletal: Normal range of motion. She exhibits no edema.   Neurological: She is alert and oriented to person, place, and time.   Skin: Skin is warm and dry.   Vitals reviewed.      Recent Labs      08/05/17   0248  08/05/17   1618  08/06/17   0204   WBC  3.8*  5.5  5.6   RBC  2.26*  2.87*  2.38*   HEMOGLOBIN  6.8*  8.6*  7.1*   HEMATOCRIT  21.1*  26.6*  21.9*   MCV  93.4  92.7  92.0   MCH  30.1  30.0  29.8   MCHC  32.2*  32.3*  32.4*   RDW  60.0*  61.5*  63.6*   PLATELETCT  238  257  240   MPV  9.8  9.8  9.8     Recent Labs      08/04/17   1208  08/05/17   0248  08/06/17   0204   SODIUM  129*  132*  137   POTASSIUM  2.7*  3.6  3.2*   CHLORIDE  94*  102  109   CO2  16*  17*  18*   GLUCOSE  119*  165*  112*   BUN  28*  27*  21   CREATININE  3.14*  2.30*  1.60*   CALCIUM  8.3*  8.3*  7.6*             Recent Labs      08/05/17   0248   TRIGLYCERIDE  417*   HDL  33*   LDL  see below          Assessment/Plan     * Anemia (present on admission)  Assessment & Plan  Lab Results   Component Value Date/Time    HEMOGLOBIN 7.1* 08/06/2017 02:04 AM    HEMATOCRIT 21.9* 08/06/2017 02:04 AM    MCV 92.0 08/06/2017 02:04 AM    PLATELET COUNT 240 08/06/2017 02:04 AM   Hb trending  down despite transfuion  Transfused 1 unit PRBCs  Goal Hb generally > 8 gm/dL  Transfusion per AABB guidelines available at ncbi.nlm.nih.gov/pubmed/28309623  Ferrous gluconate supplementation while not NPO    Diarrhea (present on admission)  Assessment & Plan  Called GI    Sepsis (CMS-[facility]) (present on admission)  Assessment & Plan  Resolved, SIRS due to dehydration, not actually sepsis.    ARF (acute renal failure) (CMS-[facility]) (present on admission)  Assessment & Plan  Severe dehydration  Improving  Lab Results   Component Value Date    BUN 21 08/06/2017    CREATININE 1.60* 08/06/2017   Baseline Cr 0.9 or so  Maintaining hydration  Avoiding nephrotoxics: NSAIDs etc  Following Cr closely; to keep near baseline as possible     SLE (systemic lupus erythematosus) (CMS-HCC) (present on admission)  Assessment & Plan  On plaquenil and steroid    Chronic rheumatic arthritis (CMS-HCC) (present on admission)  Assessment & Plan  Continuing home meds    Immunosuppressed status (CMS-HCC) (present on admission)  Assessment & Plan  Due to RA    Hypercholesterolemia (present on admission)  Assessment & Plan  Started statin given ? ?  lipids    HTN (hypertension) (present on admission)  Assessment & Plan  SBP goal less than 140 mmHg  DBP goal less than 90 mmHg  PRN and antihypertensives to titrate by hospitalist towards goal  Most recent Blood Pressure: 121/74 mmHg     Hyponatremia (present on admission)  Assessment & Plan  Hypovolemic hyponatremia  Infusing IVF: NS with 154 mEq/L  Will recheck  Goal > 135 mEq/L  Lab Results   Component Value Date/Time    SODIUM 137 08/06/2017 02:04 AM       Hypokalemia (present on admission)  Assessment & Plan  Potassium supplementation, oral preferred  Expect increase of 0.1 mEq/L per each 10 mEq given  Will recheck after supplementation  Goal > 4 mEq/L  Lab Results   Component Value Date/Time    POTASSIUM 3.2* 08/06/2017 02:04 AM         Labs reviewed        DVT Prophylaxis: Contraindicated - High  bleeding risk    Ulcer prophylaxis: Not indicated

## 2017-08-06 NOTE — CONSULTS
DATE OF SERVICE:  08/06/2017    REQUESTING PHYSICIAN:  Lukasz Jacobs MD    REASON FOR REQUEST:  Anemia.    HISTORY OF PRESENT ILLNESS:  Patient is a 51-year-old female with multiple   medical problems, primarily connective tissue in origin stemming from   rheumatoid arthritis, lupus as well as psoriasis, who presents with primary   complaint of malaise.  She was noted to have acute renal failure with   creatinine in 3s, high hyperkalemia.  Two months ago, she was admitted at that   time found to be anemic.  She did undergo an EGD and a colonoscopy.  She had   severe erosive esophagitis on EGD and in her colon she had a few tiny little   arteriovenous malformations is noted in the cecum and one of which was   treated.  She has not followed up with our office since that time.  She denies   any melena, no hematochezia.  She does have diarrhea, this is a chronic   problem for her, has been going on for many years.  She describes at least 2   loose stools per day.  She denies any abdominal pain.  No NSAID use.  No   fevers or chills.    REVIEW OF SYSTEMS:  Positive as noted above, also positive for back pain,   headaches.  Otherwise, complete review of systems is negative other than that   noted in the HPI above.    PAST MEDICAL HISTORY:  Rheumatoid arthritis, cecal angiodysplasias, GERD, SLE,   psoriasis.    ALLERGIES:  TO CODEINE.    PAST SURGICAL HISTORY:  Total abdominal hysterectomy.    MEDICATIONS:  Humira, lisinopril, allopurinol, Plaquenil, methotrexate, Nexium   daily, gemfibrozil, oxybutynin.    SOCIAL HISTORY:  She is a nonsmoker and nondrinker.    FAMILY MEDICAL HISTORY:  Mother diagnosed with stomach issues as well as   diabetes, otherwise none.    PHYSICAL EXAMINATION:  VITAL SIGNS:  Blood pressure 112/77, heart rate of 84, respiratory rate of 18.    She is afebrile, satting 100% on room air.  GENERAL:  She is a very pleasant, obese white female in no acute distress.  HEENT:  Reveals that her  extraocular movements are intact bilaterally.  Her   sclerae are anicteric bilaterally.  Oral exam reveals a Mallampati 3 score   without oral lesions.  CARDIOVASCULAR:  Regular rate and rhythm without murmurs.  LUNGS:  Clear to auscultation bilaterally.  ABDOMEN:  Soft, nondistended, no tenderness.  No organomegaly or masses   appreciated.  No rebound tenderness.  EXTREMITIES:  Evaluation reveals no pitting edema bilaterally.  SKIN:  Grossly free of rashes.    LABORATORY DATA:  CBC reveals a white blood cell count of 5.9, hemoglobin 7.8,   hematocrit 23.5, platelet count 236.  Her MCV is normal.  BMP reveals sodium   137, potassium 3.2, chloride 109, bicarb is 18, BUN 21, creatinine 1.6,   glucose 112.  Liver enzymes are revealing of an AST of 64 and an alkaline   phosphatase 190, otherwise normal, stool was negative for C. diff.    PLAN AND MEDICAL DECISION MAKIN.  Anemia, chronic gastrointestinal blood loss.  It has been assumed that   some of her anemia is secondary to the blood loss from the angiodysplasias,   although really looking back for quite a ways back she has been anemic.  I   strongly question whether much of this, is not most of this is from anemia of   chronic disease related to her multiple connective tissue disorders.  She does   not give us any story consistent with gastrointestinal blood loss and again   her MCV is normal.  Certainly, it is possible that she could be losing blood   slowly from the cecal AVMs, although they were somewhat unimpressive in size   originally, doubt that she is losing blood from her previously noted   esophagitis given the fact that she is on a daily PPIs even if her timing is   not ideal.  At this point, I am not going to plan on any additional   endoscopies as I do not think they would be fruitful at this time, we will   certainly reconsider this if that is necessary.  I cannot check her iron panel   as she has received blood transfusions and hence would be  unreliable to help   confirm whether or not this is primarily an iron deficiency type anemia.    Meanwhile, we will follow with you.  2.  Gastroesophageal reflux disease.  3.  Acute renal failure.  She has been receiving large amounts of IV fluids   and her renal function has been returning certainly this may cause some of her   decreased hemoglobin.        4.  Cecal AVMs as discussed above.  5.  Diarrhea.  6.  Obesity.       ____________________________________     MD BECKY RINCON / [facility]    DD:  08/06/2017 15:18:08  DT:  08/06/2017 15:50:45    D#:  [id number]  Job#:  [id number]    cc: Lukasz Jacobs MD

## 2017-08-06 NOTE — PROGRESS NOTES
Sherlyn Fuller Fall Risk Assessment:     Last Known Fall: No falls  Mobility: No limitations  Medications: Cardiovascular or central nervous system meds  Mental Status/LOC/Awareness: Awake, alert, and oriented to date, place, and person  Toileting Needs: No needs  Volume/Electrolyte Status: Use of IV fluids/tube feeds  Communication/Sensory: No deficits  Behavior: Appropriate behavior  Sherlyn Fuller Fall Risk Total: 5  Fall Risk Level: NO RISK    Universal Fall Precautions:  call light/belongings in reach, bed in low position and locked, siderails up x 2, wheelchairs and assistive devices out of sight, use non-slip footwear, adequate lighting, clutter free and spill free environment, educate on level of risk, educate to call for assistance    Fall Risk Level Interventions:          Patient Specific Interventions:     Medication: review medications with patient and family and limit combination of prn medications  Mental Status/LOC/Awareness: not applicable  Toileting: not applicable  Volume/Electrolyte Status: ensure patient remains hydrated and ensure IV fluids are appropriate  Communication/Sensory: update plan of care on whiteboard  Behavioral: not applicable  Mobility: not applicable

## 2017-08-07 PROBLEM — A41.9 SEPSIS, UNSPECIFIED: Status: RESOLVED | Noted: 2017-08-04 | Resolved: 2017-08-07

## 2017-08-07 LAB
ANION GAP SERPL CALC-SCNC: 9 MMOL/L (ref 0–11.9)
APTT PPP: 24.7 SEC (ref 24.7–36)
BUN SERPL-MCNC: 17 MG/DL (ref 8–22)
CALCIUM SERPL-MCNC: 7.6 MG/DL (ref 8.5–10.5)
CHLORIDE SERPL-SCNC: 114 MMOL/L (ref 96–112)
CO2 SERPL-SCNC: 16 MMOL/L (ref 20–33)
CREAT SERPL-MCNC: 1.18 MG/DL (ref 0.5–1.4)
ERYTHROCYTE [DISTWIDTH] IN BLOOD BY AUTOMATED COUNT: 66.2 FL (ref 35.9–50)
GFR SERPL CREATININE-BSD FRML MDRD: 49 ML/MIN/1.73 M 2
GLUCOSE SERPL-MCNC: 97 MG/DL (ref 65–99)
HCT VFR BLD AUTO: 22 % (ref 37–47)
HEMOCCULT STL QL: NEGATIVE
HGB BLD-MCNC: 6.9 G/DL (ref 12–16)
HGB BLD-MCNC: 7.8 G/DL (ref 12–16)
INR PPP: 1.02 (ref 0.87–1.13)
LDH SERPL-CCNC: 329 U/L (ref 107–266)
MCH RBC QN AUTO: 29.2 PG (ref 27–33)
MCHC RBC AUTO-ENTMCNC: 31.4 G/DL (ref 33.6–35)
MCV RBC AUTO: 93.2 FL (ref 81.4–97.8)
PLATELET # BLD AUTO: 245 K/UL (ref 164–446)
PMV BLD AUTO: 9.5 FL (ref 9–12.9)
POTASSIUM SERPL-SCNC: 4 MMOL/L (ref 3.6–5.5)
PROTHROMBIN TIME: 13.7 SEC (ref 12–14.6)
RBC # BLD AUTO: 2.36 M/UL (ref 4.2–5.4)
SODIUM SERPL-SCNC: 139 MMOL/L (ref 135–145)
TSH SERPL DL<=0.005 MIU/L-ACNC: 2.65 UIU/ML (ref 0.3–3.7)
WBC # BLD AUTO: 6.2 K/UL (ref 4.8–10.8)

## 2017-08-07 PROCEDURE — 700105 HCHG RX REV CODE 258

## 2017-08-07 PROCEDURE — 700102 HCHG RX REV CODE 250 W/ 637 OVERRIDE(OP): Performed by: HOSPITALIST

## 2017-08-07 PROCEDURE — 700102 HCHG RX REV CODE 250 W/ 637 OVERRIDE(OP): Performed by: INTERNAL MEDICINE

## 2017-08-07 PROCEDURE — 700101 HCHG RX REV CODE 250: Performed by: INTERNAL MEDICINE

## 2017-08-07 PROCEDURE — 85730 THROMBOPLASTIN TIME PARTIAL: CPT

## 2017-08-07 PROCEDURE — 36430 TRANSFUSION BLD/BLD COMPNT: CPT

## 2017-08-07 PROCEDURE — A9270 NON-COVERED ITEM OR SERVICE: HCPCS | Performed by: INTERNAL MEDICINE

## 2017-08-07 PROCEDURE — 83615 LACTATE (LD) (LDH) ENZYME: CPT

## 2017-08-07 PROCEDURE — 36415 COLL VENOUS BLD VENIPUNCTURE: CPT

## 2017-08-07 PROCEDURE — 700111 HCHG RX REV CODE 636 W/ 250 OVERRIDE (IP): Performed by: INTERNAL MEDICINE

## 2017-08-07 PROCEDURE — P9016 RBC LEUKOCYTES REDUCED: HCPCS

## 2017-08-07 PROCEDURE — 85027 COMPLETE CBC AUTOMATED: CPT

## 2017-08-07 PROCEDURE — 770020 HCHG ROOM/CARE - TELE (206)

## 2017-08-07 PROCEDURE — 99232 SBSQ HOSP IP/OBS MODERATE 35: CPT | Performed by: HOSPITALIST

## 2017-08-07 PROCEDURE — 85018 HEMOGLOBIN: CPT

## 2017-08-07 PROCEDURE — 85610 PROTHROMBIN TIME: CPT

## 2017-08-07 PROCEDURE — 700105 HCHG RX REV CODE 258: Performed by: HOSPITALIST

## 2017-08-07 PROCEDURE — 86923 COMPATIBILITY TEST ELECTRIC: CPT

## 2017-08-07 PROCEDURE — A9270 NON-COVERED ITEM OR SERVICE: HCPCS | Performed by: HOSPITALIST

## 2017-08-07 PROCEDURE — 82272 OCCULT BLD FECES 1-3 TESTS: CPT

## 2017-08-07 PROCEDURE — 84443 ASSAY THYROID STIM HORMONE: CPT

## 2017-08-07 PROCEDURE — 80048 BASIC METABOLIC PNL TOTAL CA: CPT

## 2017-08-07 RX ORDER — SODIUM CHLORIDE 9 MG/ML
INJECTION, SOLUTION INTRAVENOUS
Status: COMPLETED
Start: 2017-08-07 | End: 2017-08-07

## 2017-08-07 RX ORDER — OXYCODONE HYDROCHLORIDE 5 MG/1
5 TABLET ORAL EVERY 4 HOURS PRN
Status: DISCONTINUED | OUTPATIENT
Start: 2017-08-07 | End: 2017-08-09 | Stop reason: HOSPADM

## 2017-08-07 RX ADMIN — OXYBUTYNIN CHLORIDE 5 MG: 5 TABLET ORAL at 20:48

## 2017-08-07 RX ADMIN — HYDROXYCHLOROQUINE SULFATE 200 MG: 200 TABLET, FILM COATED ORAL at 20:48

## 2017-08-07 RX ADMIN — SODIUM CHLORIDE: 9 INJECTION, SOLUTION INTRAVENOUS at 03:01

## 2017-08-07 RX ADMIN — ATORVASTATIN CALCIUM 80 MG: 80 TABLET, FILM COATED ORAL at 20:48

## 2017-08-07 RX ADMIN — POLYETHYLENE GLYCOL 3350, SODIUM SULFATE ANHYDROUS, SODIUM BICARBONATE, SODIUM CHLORIDE, POTASSIUM CHLORIDE 2 L: 236; 22.74; 6.74; 5.86; 2.97 POWDER, FOR SOLUTION ORAL at 18:04

## 2017-08-07 RX ADMIN — SODIUM CHLORIDE 500 ML: 9 INJECTION, SOLUTION INTRAVENOUS at 05:20

## 2017-08-07 RX ADMIN — SODIUM CHLORIDE: 9 INJECTION, SOLUTION INTRAVENOUS at 22:34

## 2017-08-07 RX ADMIN — OMEPRAZOLE 20 MG: 20 CAPSULE, DELAYED RELEASE ORAL at 08:56

## 2017-08-07 RX ADMIN — SODIUM CHLORIDE: 9 INJECTION, SOLUTION INTRAVENOUS at 15:55

## 2017-08-07 RX ADMIN — FOLIC ACID 1 MG: 1 TABLET ORAL at 08:56

## 2017-08-07 RX ADMIN — PREDNISONE 15 MG: 10 TABLET ORAL at 08:56

## 2017-08-07 RX ADMIN — OXYCODONE HYDROCHLORIDE 5 MG: 5 TABLET ORAL at 18:07

## 2017-08-07 RX ADMIN — HYDROXYCHLOROQUINE SULFATE 200 MG: 200 TABLET, FILM COATED ORAL at 08:56

## 2017-08-07 RX ADMIN — OXYBUTYNIN CHLORIDE 5 MG: 5 TABLET ORAL at 08:56

## 2017-08-07 ASSESSMENT — PAIN SCALES - GENERAL
PAINLEVEL_OUTOF10: 7
PAINLEVEL_OUTOF10: 6
PAINLEVEL_OUTOF10: 0
PAINLEVEL_OUTOF10: 6
PAINLEVEL_OUTOF10: 0
PAINLEVEL_OUTOF10: 6
PAINLEVEL_OUTOF10: 0

## 2017-08-07 ASSESSMENT — ENCOUNTER SYMPTOMS
HEADACHES: 0
PALPITATIONS: 0
DIARRHEA: 1
DIZZINESS: 0
FEVER: 0
SHORTNESS OF BREATH: 0
MYALGIAS: 0
DIARRHEA: 0
COUGH: 0
FOCAL WEAKNESS: 0
CHILLS: 0
VOMITING: 0
ABDOMINAL PAIN: 0
NAUSEA: 0
BLOOD IN STOOL: 0
CONSTIPATION: 0
WEAKNESS: 1

## 2017-08-07 NOTE — CARE PLAN
Problem: Communication  Goal: The ability to communicate needs accurately and effectively will improve  Pt able to communicate with staff.

## 2017-08-07 NOTE — DISCHARGE PLANNING
Care Transition Team Assessment    Met with pt at bedside, pt states she will discharge home with no needs. Pt lives with her , takes MTM for transportation.    Information Source  Orientation : Oriented x 4  Information Given By: Patient  Informant's Name: Brandi Leggett  Who is responsible for making decisions for patient? : Patient    Readmission Evaluation  Is this a readmission?: No    Elopement Risk  Legal Hold: No  Ambulatory or Self Mobile in Wheelchair: Yes  Disoriented: No  Psychiatric Symptoms: None  History of Wandering: No  Elopement this Admit: No  Vocalizing Wanting to Leave: No  Displays Behaviors, Body Language Wanting to Leave: No-Not at Risk for Elopement  Elopement Risk: Not at Risk for Elopement    Interdisciplinary Discharge Planning  Does Admitting Nurse Feel This Could be a Complex Discharge?: No  Primary Care Physician: Alexey  (([facility]) )  Lives with - Patient's Self Care Capacity: Spouse  Patient or legal guardian wants to designate a caregiver (see row info): No  Support Systems: Family Member(s), Friends / Neighbors  Housing / Facility: [address]  Do You Take your Prescribed Medications Regularly: Yes  Able to Return to Previous ADL's: Yes  Mobility Issues: No  Prior Services: None  Patient Expects to be Discharged to:: Home  Assistance Needed: No  Durable Medical Equipment: Not Applicable    Discharge Preparedness  What is your plan after discharge?: Home with help  What are your discharge supports?: Spouse  Prior Functional Level: Ambulatory, Independent with Activities of Daily Living, Independent with Medication Management, Uses Cane, Uses Walker  Difficulity with ADLs: Walking  Difficulty with ADLs Comment: Pt uses cane, walker  Difficulity with IADLs: Driving  Difficulity with IADL Comments: Pt doesn't drive, uses MTM    Functional Assesment  Prior Functional Level: Ambulatory, Independent with Activities of Daily Living, Independent with Medication Management, Uses Cane,  Uses Walker    Finances  Financial Barriers to Discharge: No  Prescription Coverage: Yes (Pharmacy: [address] )    Vision / Hearing Impairment  Vision Impairment : Yes  Right Eye Vision: Impaired  Left Eye Vision: Impaired  Hearing Impairment : No    Values / Beliefs / Concerns  Values / Beliefs Concerns : No    Advance Directive  Advance Directive?: None  Advance Directive offered?: AD Booklet refused    Domestic Abuse  Have you ever been the victim of abuse or violence?: No  Physical Abuse or Sexual Abuse: No  Verbal Abuse or Emotional Abuse: No  Possible Abuse Reported to:: Not Applicable    Psychological Assessment  History of Substance Abuse: None  History of Psychiatric Problems: No  Non-compliant with Treatment: No  Newly Diagnosed Illness: No    Discharge Risks or Barriers  Discharge risks or barriers?: No    Anticipated Discharge Information  Anticipated discharge disposition: Home  Discharge Address: [facility] [address] [id number]  Discharge Contact Phone Number: [phone]

## 2017-08-07 NOTE — PROGRESS NOTES
Bedside report received from AM RN; assumed pt care; assessment complete; pt siting up in bed, awake, pt alert and oriented x4; no complaints of pain or shortness of breath; teaching given regarding fall precautions and skin breakdown prevention; snacks provided per pt request; needs met; pt denies other needs for now; call light within reach; bed in lowest position; will continue to monitor.

## 2017-08-07 NOTE — PROGRESS NOTES
Assumed care of pt.  Assessment complete.  A&O x 4.  No signs of distress.  Pt denies any pain.  Discussed plan of care.  Call light within reach.  Treaded socks on pt.  Will continue to monitor.

## 2017-08-07 NOTE — CARE PLAN
Problem: Safety  Goal: Will remain free from falls  Outcome: PROGRESSING AS EXPECTED  Pt teaching given regarding safety and fall precautions; pt verbalized understanding; bed in lowest position; treaded socks on; pt AAOx4; gait steady, no assistance needed; no complaints of dizziness. Pt aware to use call light for assistance as needed. pt belongings and call light within reach.        Problem: Knowledge Deficit  Goal: Knowledge of disease process/condition, treatment plan, diagnostic tests, and medications will improve  Outcome: PROGRESSING AS EXPECTED  Pt teaching given about medications, activity, unit routine, plan of care discussed.        Problem: Pain Management  Goal: Pain level will decrease to patient’s comfort goal  Pt denies any pain; PRN pain medication available per MD order.

## 2017-08-07 NOTE — PROGRESS NOTES
Sherlyn Fuller Fall Risk Assessment:     Last Known Fall: No falls  Mobility: No limitations  Medications: Cardiovascular or central nervous system meds  Mental Status/LOC/Awareness: Awake, alert, and oriented to date, place, and person  Toileting Needs: No needs  Volume/Electrolyte Status: Use of IV fluids/tube feeds  Communication/Sensory: No deficits  Behavior: Appropriate behavior  Sherlyn Fuller Fall Risk Total: 5  Fall Risk Level: NO RISK    Universal Fall Precautions:  call light/belongings in reach, bed in low position and locked, siderails up x 2, wheelchairs and assistive devices out of sight, use non-slip footwear, adequate lighting, clutter free and spill free environment, educate on level of risk, educate to call for assistance    Fall Risk Level Interventions:          Patient Specific Interventions:     Medication: review medications with patient and family, assess for medications that can be discontinued or dosage decreased, limit combination of prn medications, provide patients who received diuretics/laxatives frequent toileting and assess need for orthostatic hypotension evaluation  Mental Status/LOC/Awareness: reinforce falls education, check on patient hourly, utilize bed/chair fall alarm, reinforce the use of call light and provide activity  Toileting: consider obtaining elevated toilet seat or bedside commode (BSC), provide frquent toileting, monitor intake and output/use of appropriate interventions, instruct patient/family on the use of grab bars and instruct patient/family on the need to call for assistance when toileting  Volume/Electrolyte Status: ensure patient remains hydrated and monitor blood sugars and maintain appropriate blood sugar levels if diabetic  Communication/Sensory: update plan of care on whiteboard, provide communication alternatives/ and collaborate with doctor for possible speech therapy consult  Behavioral:   Mobility: schedule physical activity throughout the  day, utilize bed/chair fall alarm, ensure bed is locked and in lowest position and provide appropriate assistive device

## 2017-08-07 NOTE — PROGRESS NOTES
Sherlyn Fuller Fall Risk Assessment:     Last Known Fall: No falls  Mobility: No limitations  Medications: Cardiovascular or central nervous system meds  Mental Status/LOC/Awareness: Awake, alert, and oriented to date, place, and person  Toileting Needs: No needs  Volume/Electrolyte Status: Use of IV fluids/tube feeds  Communication/Sensory: No deficits  Behavior: Appropriate behavior  Sherlyn Fuller Fall Risk Total: 5  Fall Risk Level: NO RISK    Universal Fall Precautions:  call light/belongings in reach, bed in low position and locked, siderails up x 2, wheelchairs and assistive devices out of sight, use non-slip footwear, adequate lighting, clutter free and spill free environment, educate on level of risk, educate to call for assistance    Fall Risk Level Interventions:          Patient Specific Interventions:     Medication: review medications with patient and family  Mental Status/LOC/Awareness: reorient patient, reinforce falls education and check on patient hourly  Toileting: monitor intake and output/use of appropriate interventions  Volume/Electrolyte Status: ensure patient remains hydrated  Communication/Sensory: update plan of care on whiteboard  Behavioral: encourage patient to voice feelings  Mobility: schedule physical activity throughout the day, provide comfort measures during transport and dangle prior to standing

## 2017-08-07 NOTE — PROGRESS NOTES
Gastroenterology Progress Note     Author: Abdon Boone   Date & Time Created: 8/7/2017 10:10 AM    Interval History:  Anemia    Doing well th is am. No melena or hematochezia. No abd pain.     Review of Systems:  Review of Systems   Constitutional: Positive for malaise/fatigue. Negative for fever and chills.   Gastrointestinal: Positive for diarrhea. Negative for abdominal pain, blood in stool and melena.   Neurological: Positive for weakness.       Physical Exam:  Physical Exam   Constitutional: She is oriented to person, place, and time. She appears well-developed and well-nourished.   obese   HENT:   Head: Atraumatic.   Eyes: EOM are normal. No scleral icterus.   Cardiovascular: Normal rate and regular rhythm.  Exam reveals no friction rub.    Pulmonary/Chest: Effort normal and breath sounds normal. No respiratory distress.   Abdominal: Soft. She exhibits no distension. There is no tenderness. There is no rebound.   Musculoskeletal: She exhibits no edema.   Neurological: She is alert and oriented to person, place, and time.   Skin: Skin is warm and dry. No erythema.   Psychiatric: She has a normal mood and affect. Her behavior is normal. Thought content normal.       Labs:        Invalid input(s): [id number]      Recent Labs      08/04/17   1208  08/05/17   0248  08/06/17   0204  08/07/17   0146   SODIUM  129*  132*  137  139   POTASSIUM  2.7*  3.6  3.2*  4.0   CHLORIDE  94*  102  109  114*   CO2  16*  17*  18*  16*   BUN  28*  27*  21  17   CREATININE  3.14*  2.30*  1.60*  1.18   MAGNESIUM  1.3*  1.5   --    --    CALCIUM  8.3*  8.3*  7.6*  7.6*     Recent Labs      08/04/17   1208  08/05/17   0248  08/06/17   0204  08/07/17   0146   ALTSGPT  22  19   --    --    ASTSGOT  72*  64*   --    --    ALKPHOSPHAT  228*  190*   --    --    TBILIRUBIN  1.6*  1.2   --    --    LIPASE  54   --    --    --    GLUCOSE  119*  165*  112*  97     Recent Labs      08/06/17   0204  08/06/17   1446  08/07/17   0146   RBC   2.38*  2.52*  2.36*   HEMOGLOBIN  7.1*  7.8*  6.9*   HEMATOCRIT  21.9*  23.5*  22.0*   PLATELETCT  240  236  245   PROTHROMBTM   --    --   13.7   APTT   --    --   24.7   INR   --    --   1.02     Recent Labs      17   1208  17   0248   17   0204  17   1446  17   0146   WBC  4.7*  3.8*   < >  5.6  5.9  6.2   NEUTSPOLYS  63.50  71.10   --    --    --    --    LYMPHOCYTES  22.10  11.80*   --    --    --    --    MONOCYTES  9.60  12.10   --    --    --    --    EOSINOPHILS  0.00  0.00   --    --    --    --    BASOPHILS  0.90  0.30   --    --    --    --    ASTSGOT  72*  64*   --    --    --    --    ALTSGPT  22  19   --    --    --    --    ALKPHOSPHAT  228*  190*   --    --    --    --    TBILIRUBIN  1.6*  1.2   --    --    --    --     < > = values in this interval not displayed.     Hemodynamics:  Temp (24hrs), Av.6 °C (97.9 °F), Min:35.8 °C (96.4 °F), Max:37.1 °C (98.8 °F)  Temperature: 37.1 °C (98.8 °F)  Pulse  Av.3  Min: 64  Max: 114  Blood Pressure: 116/74 mmHg     Respiratory:    Respiration: 16, Pulse Oximetry: 98 %        RUL Breath Sounds: Clear, RML Breath Sounds: Clear, RLL Breath Sounds: Clear, ARNOL Breath Sounds: Clear, LLL Breath Sounds: Clear  Fluids:    Intake/Output Summary (Last 24 hours) at 17 1010  Last data filed at 17 0800   Gross per 24 hour   Intake   3870 ml   Output      0 ml   Net   3870 ml     Weight: 85.1 kg (187 lb 9.8 oz)  GI/Nutrition:  Orders Placed This Encounter   Procedures   • DIET ORDER     Standing Status: Standing      Number of Occurrences: 1      Standing Expiration Date:      Order Specific Question:  Diet:     Answer:  Clear Liquids - No Red Foods [12]     Medical Decision Making, by Problem:  Active Hospital Problems    Diagnosis   • *Anemia [D64.9]   • Sepsis (CMS-[facility]) [A41.9]   • ARF (acute renal failure) (CMS-HCC) [N17.9]   • Diarrhea [R19.7]   • Hypercholesterolemia [E78.00]   • Immunosuppressed status (CMS-HCC)  [D89.9]   • Chronic rheumatic arthritis (CMS-HCC) [M06.9]   • SLE (systemic lupus erythematosus) (CMS-HCC) [M32.9]   • Hyponatremia [E87.1]   • Hypokalemia [E87.6]   • HTN (hypertension) [I10]     Impression and Plan    1) Anemia of chronic disease - suspect that this is the main cause of her anemia but given the recent history of cecal AVM's will plan on colo tomorrow am especially with the slight drop this am. Discussed RBA's with patient in detail and she agrees.  2) AVM, colon - see above  3) GERD - on PPI's  4) Acute renal failure - improving  5) Diarrhea, chronic  6) Obesity  7) Lupus  8) RA    Will follow    Abdon Boone MD      Core Measures

## 2017-08-07 NOTE — DIETARY
Nutrition Services - Poor PO/Wt Loss PTA     52 YO F admitted r/t generalized weakness and malaise. Acute renal failure and hypokalemia. PMH significant for lupus, RA, immunosuppression, GI ulcers per Pt and pancreatitis. Dx: AVM colon, anemia     Medication reviewed and notable for folvite, prednisone and plaquenil. Labs reviewed and notable for GFR 49, , cholesterol 401, triglycerides 417 and LDL 33. Na WNL (hyponatremia on admission). C.diff negative. No pressure areas or edema documented at this time. Pt c/o diarrhea x 2 weeks PTA, but this has resolved per MD notes. Last BM 8/7. No melena per GI notes.     Weight is 85.1kg and BMI is 33.24kg/m2, which is obese. Pt reports losing 33 lbs since January from poor appetite due to the death of her father. This is a 15% significant weight loss.     Pt appears pale and to have a depressed mood. She had been eating % of a low-fiber GI soft diet and is agreeable to between meal snacks if necessary. She is now on a clear liquid diet and will be NPO tonight for colonoscpy.     Plan/Recommendation    Diet per gastroenterology for testing.     RD will monitor and will follow up for POC for diet advancement.

## 2017-08-07 NOTE — PROGRESS NOTES
[facility] Hospitalist Progress Note    Date of Service: 2017    Chief Complaint  51 y.o. female admitted 2017 with SLE and dehydration with recent c diff    Interval Problem Update   - her hemoglobin trended down overnight. She has not had any overt GI bleeding. She has not noticed any melena. Her appetite is actually improved, and she says she feels generally well. She follows with Yamilka Mcknight as an outpatient for rheumatology.     - transfused but Hgb still trending down. She denies seeing any blood in her stool but she tells me she had ulcers previously and lost blood that way. She had previously seen Dr. Canseco. Consulted Dr. Boone. Questions answered    Consultants/Specialty  Paolo, GI    Disposition  Clinical         Review of Systems   Constitutional: Positive for malaise/fatigue. Negative for fever and chills.   Respiratory: Negative for cough and shortness of breath.    Cardiovascular: Negative for chest pain and palpitations.   Gastrointestinal: Negative for nausea, vomiting, abdominal pain, diarrhea and constipation.   Genitourinary: Negative for dysuria.   Musculoskeletal: Negative for myalgias.   Skin: Negative for itching.   Neurological: Positive for weakness. Negative for dizziness, focal weakness and headaches.   All other systems reviewed and are negative.     Physical Exam  Laboratory/Imaging   Hemodynamics  Temp (24hrs), Av.6 °C (97.9 °F), Min:35.8 °C (96.4 °F), Max:37.1 °C (98.8 °F)   Temperature: 37.1 °C (98.8 °F)  Pulse  Av.3  Min: 64  Max: 114   Blood Pressure: 116/74 mmHg      Respiratory      Respiration: 16, Pulse Oximetry: 98 %        RUL Breath Sounds: Clear, RML Breath Sounds: Clear, RLL Breath Sounds: Clear, ARNOL Breath Sounds: Clear, LLL Breath Sounds: Clear    Fluids    Intake/Output Summary (Last 24 hours) at 17 1040  Last data filed at 17 0800   Gross per 24 hour   Intake   3550 ml   Output      0 ml   Net   3550 ml       Nutrition  Orders Placed  This Encounter   Procedures   • DIET ORDER     Standing Status: Standing      Number of Occurrences: 1      Standing Expiration Date:      Order Specific Question:  Diet:     Answer:  Clear Liquids - No Red Foods [12]   • DIET NPO     Standing Status: Standing      Number of Occurrences: 8      Standing Expiration Date:      Order Specific Question:  Restrict to:     Answer:  Sips with Medications [3]     Physical Exam   Constitutional: She appears well-developed and well-nourished.   HENT:   Head: Normocephalic and atraumatic.   Mouth/Throat: Oropharynx is clear and moist.   Eyes: Conjunctivae are normal. Pupils are equal, round, and reactive to light. No scleral icterus.   Cardiovascular: Normal rate, regular rhythm, normal heart sounds and intact distal pulses.    No murmur heard.  Pulmonary/Chest: Effort normal and breath sounds normal. No respiratory distress. She has no wheezes.   Abdominal: Soft. Bowel sounds are normal. She exhibits no distension. There is no tenderness.   Musculoskeletal: Normal range of motion. She exhibits no edema or tenderness.   Neurological: She is alert.   Vitals reviewed.      Recent Labs      08/06/17   0204  08/06/17   1446  08/07/17   0146  08/07/17   0938   WBC  5.6  5.9  6.2   --    RBC  2.38*  2.52*  2.36*   --    HEMOGLOBIN  7.1*  7.8*  6.9*  7.8*   HEMATOCRIT  21.9*  23.5*  22.0*   --    MCV  92.0  93.3  93.2   --    MCH  29.8  31.0  29.2   --    MCHC  32.4*  33.2*  31.4*   --    RDW  63.6*  65.7*  66.2*   --    PLATELETCT  240  236  245   --    MPV  9.8  9.3  9.5   --      Recent Labs      08/05/17   0248  08/06/17   0204  08/07/17   0146   SODIUM  132*  137  139   POTASSIUM  3.6  3.2*  4.0   CHLORIDE  102  109  114*   CO2  17*  18*  16*   GLUCOSE  165*  112*  97   BUN  27*  21  17   CREATININE  2.30*  1.60*  1.18   CALCIUM  8.3*  7.6*  7.6*     Recent Labs      08/07/17   0146   APTT  24.7   INR  1.02         Recent Labs      08/05/17   0248   TRIGLYCERIDE  417*   HDL  33*    LDL  see below          Assessment/Plan     * Anemia (present on admission)  Assessment & Plan  Lab Results   Component Value Date/Time    HEMOGLOBIN 7.8* 08/07/2017 09:38 AM    HEMATOCRIT 22.0* 08/07/2017 01:46 AM    MCV 93.2 08/07/2017 01:46 AM    PLATELET COUNT 245 08/07/2017 01:46 AM   Hb trending down despite transfuion  Transfused 2 unit PRBCs  GI will scope tomorrow    Diarrhea (present on admission)  Assessment & Plan  Resolved    ARF (acute renal failure) (CMS-[facility]) (present on admission)  Assessment & Plan  Virtually resolved   Lab Results   Component Value Date    BUN 17 08/07/2017    CREATININE 1.18 08/07/2017   Baseline Cr 0.9 or so  Maintaining hydration  Avoiding nephrotoxics: NSAIDs etc  Following Cr closely; to keep near baseline as possible     SLE (systemic lupus erythematosus) (CMS-HCC) (present on admission)  Assessment & Plan  On plaquenil and steroid    Chronic rheumatic arthritis (CMS-HCC) (present on admission)  Assessment & Plan  Continuing home meds    Immunosuppressed status (CMS-HCC) (present on admission)  Assessment & Plan  Due to RA    Hypercholesterolemia (present on admission)  Assessment & Plan  Started statin given ? ?  lipids    HTN (hypertension) (present on admission)  Assessment & Plan  SBP goal less than 140 mmHg  DBP goal less than 90 mmHg  PRN and antihypertensives to titrate by hospitalist towards goal  Most recent Blood Pressure: 116/74 mmHg     Hyponatremia (present on admission)  Assessment & Plan  Resolved  Goal > 135 mEq/L  Lab Results   Component Value Date/Time    SODIUM 139 08/07/2017 01:46 AM       Hypokalemia (present on admission)  Assessment & Plan  Resolved  Goal > 4 mEq/L  Lab Results   Component Value Date/Time    POTASSIUM 4.0 08/07/2017 01:46 AM         Labs reviewed        DVT Prophylaxis: Contraindicated - High bleeding risk    Ulcer prophylaxis: Not indicated

## 2017-08-08 LAB
HAPTOGLOB SERPL-MCNC: 165 MG/DL (ref 30–200)
HCT VFR BLD AUTO: 25.4 % (ref 37–47)
HCT VFR BLD AUTO: 27 % (ref 37–47)
HGB BLD-MCNC: 8.1 G/DL (ref 12–16)

## 2017-08-08 PROCEDURE — 700111 HCHG RX REV CODE 636 W/ 250 OVERRIDE (IP)

## 2017-08-08 PROCEDURE — 501159 HCHG PROBE, LAP: Performed by: INTERNAL MEDICINE

## 2017-08-08 PROCEDURE — 700111 HCHG RX REV CODE 636 W/ 250 OVERRIDE (IP): Performed by: INTERNAL MEDICINE

## 2017-08-08 PROCEDURE — 700102 HCHG RX REV CODE 250 W/ 637 OVERRIDE(OP): Performed by: INTERNAL MEDICINE

## 2017-08-08 PROCEDURE — 99233 SBSQ HOSP IP/OBS HIGH 50: CPT | Performed by: INTERNAL MEDICINE

## 2017-08-08 PROCEDURE — 160204 HCHG ENDO MINUTES - 1ST 30 MINS LEVEL 5: Performed by: INTERNAL MEDICINE

## 2017-08-08 PROCEDURE — 85018 HEMOGLOBIN: CPT

## 2017-08-08 PROCEDURE — 160009 HCHG ANES TIME/MIN: Performed by: INTERNAL MEDICINE

## 2017-08-08 PROCEDURE — 160035 HCHG PACU - 1ST 60 MINS PHASE I: Performed by: INTERNAL MEDICINE

## 2017-08-08 PROCEDURE — 700102 HCHG RX REV CODE 250 W/ 637 OVERRIDE(OP): Performed by: HOSPITALIST

## 2017-08-08 PROCEDURE — 160048 HCHG OR STATISTICAL LEVEL 1-5: Performed by: INTERNAL MEDICINE

## 2017-08-08 PROCEDURE — 85014 HEMATOCRIT: CPT

## 2017-08-08 PROCEDURE — 36415 COLL VENOUS BLD VENIPUNCTURE: CPT

## 2017-08-08 PROCEDURE — 700105 HCHG RX REV CODE 258: Performed by: HOSPITALIST

## 2017-08-08 PROCEDURE — 770020 HCHG ROOM/CARE - TELE (206)

## 2017-08-08 PROCEDURE — A9270 NON-COVERED ITEM OR SERVICE: HCPCS | Performed by: HOSPITALIST

## 2017-08-08 PROCEDURE — 160002 HCHG RECOVERY MINUTES (STAT): Performed by: INTERNAL MEDICINE

## 2017-08-08 PROCEDURE — A9270 NON-COVERED ITEM OR SERVICE: HCPCS | Performed by: INTERNAL MEDICINE

## 2017-08-08 PROCEDURE — 0D5N8ZZ DESTRUCTION OF SIGMOID COLON, VIA NATURAL OR ARTIFICIAL OPENING ENDOSCOPIC: ICD-10-PCS | Performed by: INTERNAL MEDICINE

## 2017-08-08 RX ADMIN — POLYETHYLENE GLYCOL 3350, SODIUM SULFATE ANHYDROUS, SODIUM BICARBONATE, SODIUM CHLORIDE, POTASSIUM CHLORIDE 2 L: 236; 22.74; 6.74; 5.86; 2.97 POWDER, FOR SOLUTION ORAL at 05:27

## 2017-08-08 RX ADMIN — OXYBUTYNIN CHLORIDE 5 MG: 5 TABLET ORAL at 11:08

## 2017-08-08 RX ADMIN — ATORVASTATIN CALCIUM 80 MG: 80 TABLET, FILM COATED ORAL at 20:25

## 2017-08-08 RX ADMIN — HYDROXYCHLOROQUINE SULFATE 200 MG: 200 TABLET, FILM COATED ORAL at 20:24

## 2017-08-08 RX ADMIN — OXYCODONE HYDROCHLORIDE 5 MG: 5 TABLET ORAL at 22:00

## 2017-08-08 RX ADMIN — FOLIC ACID 1 MG: 1 TABLET ORAL at 11:09

## 2017-08-08 RX ADMIN — PREDNISONE 15 MG: 10 TABLET ORAL at 11:09

## 2017-08-08 RX ADMIN — OXYBUTYNIN CHLORIDE 5 MG: 5 TABLET ORAL at 20:25

## 2017-08-08 RX ADMIN — SODIUM CHLORIDE: 9 INJECTION, SOLUTION INTRAVENOUS at 05:25

## 2017-08-08 RX ADMIN — OMEPRAZOLE 20 MG: 20 CAPSULE, DELAYED RELEASE ORAL at 11:09

## 2017-08-08 RX ADMIN — HYDROXYCHLOROQUINE SULFATE 200 MG: 200 TABLET, FILM COATED ORAL at 11:08

## 2017-08-08 ASSESSMENT — ENCOUNTER SYMPTOMS
FEVER: 0
BACK PAIN: 0
WEAKNESS: 1
DIZZINESS: 0
DIAPHORESIS: 0
HEADACHES: 0
SHORTNESS OF BREATH: 0
COUGH: 0
PALPITATIONS: 0
VOMITING: 0
FOCAL WEAKNESS: 0
DEPRESSION: 0
MYALGIAS: 0
ABDOMINAL PAIN: 0
NAUSEA: 0
NERVOUS/ANXIOUS: 0
CHILLS: 0

## 2017-08-08 ASSESSMENT — PAIN SCALES - GENERAL
PAINLEVEL_OUTOF10: 6
PAINLEVEL_OUTOF10: 0
PAINLEVEL_OUTOF10: 0

## 2017-08-08 ASSESSMENT — LIFESTYLE VARIABLES: DO YOU DRINK ALCOHOL: NO

## 2017-08-08 NOTE — PROGRESS NOTES
Aaox4, up to br steady, denies  Any pain, POC discussed, bowel prep for colonoscopy this am, needs attended, kept safe.

## 2017-08-08 NOTE — PROGRESS NOTES
Back from colonoscopy, denies any pain nor discomfort, POC discussed, hgb/hct labs ordered, diabetic diet ordered per GI MD.

## 2017-08-08 NOTE — PROCEDURES
DATE OF SERVICE:  08/08/2017    PROCEDURE:  Colonoscopy with lesion ablation.    ENDOSCOPIST:  Abdon Boone MD    INDICATION FOR PROCEDURE:  AVM in colon and anemia.    MEDICATIONS:  Patient received monitored anesthesia care by Dr. Syed,   please see anesthesia flow sheets for details.    DESCRIPTION OF PROCEDURE:  Patient was explained in detail the indications as   well as the risks, the benefits, the alternatives of the procedure and she   would like to proceed, consent is signed and placed on the chart.  After the   above medications were slowly and carefully given and patient was deemed   adequately sedated, a standard Olympus variable stiffness adult colonoscope   was lubricated and gently maneuvered through the colon into the cecum.  Cecal   landmarks were identified included the ileocecal valve, appendiceal orifice,   and cecal strap.  Preparation was excellent.  Photographs were obtained.  The   scope was then slowly and carefully withdrawn looking mucosa in a   circumferential methodic manner.  There were no abnormalities in the colon   except for in the sigmoid, there was an area of angiodysplasias and what   appears to be a venous bleb all clustered together.  At this point, we   utilized the forward firing APC to treat the entire area with excellent   results.  Retroflexion was performed in the rectum.  Air was suctioned out of   the colon and then this scope removed and the procedure terminated.    FINDINGS:  1.  Arteriovenous malformations in the sigmoid colon.  2.  Otherwise, normal.    COMPLICATIONS:  None.    TISSUE/BIOPSIES:  None.    THERAPY:  Argon plasma coagulation lesion ablation performed.    IMPRESSION/PLAN:  1.  AVM colon with bleeding.  This certainly could be at least contributing to   her anemia, although I do think she has anemia of chronic disease, most   likely also given the normal MCV secondary to her other conditions; however,   at this point, we treated the lesion  successfully with argon plasma   coagulation. We will follow and monitor hemoglobin tomorrow.  2.  Anemia of chronic disease.  Again, I suspect she does have anemia of   chronic disease at least contributing to this her current anemia.  3.  Gastroesophageal reflux disease.  4.  Renal failure.  5.  Diarrhea.       ____________________________________     MD BECKY RINCON / VICTOR HUGO    DD:  08/08/2017 10:35:14  DT:  08/08/2017 11:05:02    D#:  [id number]  Job#:  [id number]

## 2017-08-08 NOTE — OR SURGEON
Operative Report    PreOp Diagnosis: AVM colon    PostOp Diagnosis: AVM colon - treated    Procedure(s):  COLONOSCOPY - Wound Class: Clean Contaminated    Surgeon(s):  Abdon Boone M.D.    Anesthesiologist/Type of Anesthesia:  Anesthesiologist: Chris Syed M.D./Sedation    Surgical Staff:  Circulator: Argelia Leos R.N.  Endoscopy Technician: Betyt Echavarria Circulator: Sunita Max R.N.    Specimens:  * No specimens in log *    Estimated Blood Loss: None    Findings: 1) AVM in sigmoid colon with apparent venous bleb and angiodysplasia components    Complications: None    Treated AVM with APC    Ok to advance diet        8/8/2017 10:29 AM Abdon Boone

## 2017-08-08 NOTE — PROGRESS NOTES
Sherlyn Fuller Fall Risk Assessment:     Last Known Fall: Within the last six months  Mobility: No limitations  Medications: No meds  Mental Status/LOC/Awareness: Awake, alert, and oriented to date, place, and person  Toileting Needs: No needs, Diarrhea/frequency/urgency  Volume/Electrolyte Status: Use of IV fluids/tube feeds  Communication/Sensory: Visual (Glasses)/hearing deficit  Behavior: Appropriate behavior  Sherlyn Fuller Fall Risk Total: 7  Fall Risk Level: LOW RISK    Universal Fall Precautions:  call light/belongings in reach, bed in low position and locked, wheelchairs and assistive devices out of sight, siderails up x 2, use non-slip footwear, adequate lighting, clutter free and spill free environment, educate on level of risk, educate to call for assistance    Fall Risk Level Interventions:   TRIAL (TELE 8, NEURO, MED JOLENE 5) Low Fall Risk Interventions  Place yellow fall risk ID band on patient: verified  Provide patient/family education based on risk assessment: verified  Educate patient/family to call staff for assistance when getting out of bed: verified  Place fall precaution signage outside patient door: verified      Patient Specific Interventions:     Medication: review medications with patient and family, assess for medications that can be discontinued or dosage decreased and limit combination of prn medications  Mental Status/LOC/Awareness: reorient patient, reinforce falls education, check on patient hourly, reinforce the use of call light and provide activity  Toileting: provide frquent toileting, instruct patient/family on the use of grab bars and instruct patient/family on the need to call for assistance when toileting  Volume/Electrolyte Status: ensure patient remains hydrated, advance diet as tolerated and ensure IV fluids are appropriate  Communication/Sensory: update plan of care on whiteboard, provide communication alternatives/ and ensure patient has glasses/contacts and  hearing aids/dentures  Behavioral: engage patient in daily activities  Mobility: schedule physical activity throughout the day, provide comfort measures during transport and ensure bed is locked and in lowest position

## 2017-08-08 NOTE — CARE PLAN
Problem: Knowledge Deficit  Goal: Knowledge of disease process/condition, treatment plan, diagnostic tests, and medications will improve  Intervention: Explain information regarding disease process/condition, treatment plan, diagnostic tests, and medications and document in education  NPO and bowel prep for colonoscopy

## 2017-08-08 NOTE — PROGRESS NOTES
Received report from day RN assumed care at 1915. Pt A&Ox 4 . Pt states 0 /10 pain at this time. Plan of care discussed with Pt, verbalized understanding. no family present at bedside. Assessment completed. All Pt needs met at this time. Call light within reach, bed alarm off , bed locked and in low position. Will continue to monitor.

## 2017-08-09 VITALS
RESPIRATION RATE: 18 BRPM | WEIGHT: 187.39 LBS | TEMPERATURE: 98.5 F | BODY MASS INDEX: 33.2 KG/M2 | DIASTOLIC BLOOD PRESSURE: 93 MMHG | HEIGHT: 63 IN | SYSTOLIC BLOOD PRESSURE: 153 MMHG | OXYGEN SATURATION: 97 % | HEART RATE: 119 BPM

## 2017-08-09 LAB
ANION GAP SERPL CALC-SCNC: 8 MMOL/L (ref 0–11.9)
ANISOCYTOSIS BLD QL SMEAR: ABNORMAL
BACTERIA BLD CULT: NORMAL
BACTERIA BLD CULT: NORMAL
BASOPHILS # BLD AUTO: 3.5 % (ref 0–1.8)
BASOPHILS # BLD: 0.24 K/UL (ref 0–0.12)
BUN SERPL-MCNC: 11 MG/DL (ref 8–22)
CALCIUM SERPL-MCNC: 7.6 MG/DL (ref 8.5–10.5)
CHLORIDE SERPL-SCNC: 113 MMOL/L (ref 96–112)
CO2 SERPL-SCNC: 17 MMOL/L (ref 20–33)
CREAT SERPL-MCNC: 0.84 MG/DL (ref 0.5–1.4)
EOSINOPHIL # BLD AUTO: 0 K/UL (ref 0–0.51)
EOSINOPHIL NFR BLD: 0 % (ref 0–6.9)
ERYTHROCYTE [DISTWIDTH] IN BLOOD BY AUTOMATED COUNT: 65 FL (ref 35.9–50)
GFR SERPL CREATININE-BSD FRML MDRD: >60 ML/MIN/1.73 M 2
GLUCOSE SERPL-MCNC: 84 MG/DL (ref 65–99)
HCT VFR BLD AUTO: 23.6 % (ref 37–47)
HCT VFR BLD AUTO: 27.5 % (ref 37–47)
HGB BLD-MCNC: 7.6 G/DL (ref 12–16)
HGB BLD-MCNC: 8.8 G/DL (ref 12–16)
LYMPHOCYTES # BLD AUTO: 0.99 K/UL (ref 1–4.8)
LYMPHOCYTES NFR BLD: 14.3 % (ref 22–41)
MACROCYTES BLD QL SMEAR: ABNORMAL
MANUAL DIFF BLD: NORMAL
MCH RBC QN AUTO: 30.4 PG (ref 27–33)
MCHC RBC AUTO-ENTMCNC: 32.2 G/DL (ref 33.6–35)
MCV RBC AUTO: 94.4 FL (ref 81.4–97.8)
MONOCYTES # BLD AUTO: 0.06 K/UL (ref 0–0.85)
MONOCYTES NFR BLD AUTO: 0.9 % (ref 0–13.4)
MORPHOLOGY BLD-IMP: NORMAL
MYELOCYTES NFR BLD MANUAL: 2.7 %
NEUTROPHILS # BLD AUTO: 5.36 K/UL (ref 2–7.15)
NEUTROPHILS NFR BLD: 76.8 % (ref 44–72)
NEUTS BAND NFR BLD MANUAL: 0.9 % (ref 0–10)
NRBC # BLD AUTO: 0.08 K/UL
NRBC BLD AUTO-RTO: 1.2 /100 WBC
PLATELET # BLD AUTO: 214 K/UL (ref 164–446)
PMV BLD AUTO: 9.5 FL (ref 9–12.9)
POIKILOCYTOSIS BLD QL SMEAR: NORMAL
POLYCHROMASIA BLD QL SMEAR: NORMAL
POTASSIUM SERPL-SCNC: 3.5 MMOL/L (ref 3.6–5.5)
PROMYELOCYTES NFR BLD MANUAL: 0.9 %
RBC # BLD AUTO: 2.5 M/UL (ref 4.2–5.4)
RBC BLD AUTO: PRESENT
SCHISTOCYTES BLD QL SMEAR: NORMAL
SIGNIFICANT IND 70042: NORMAL
SIGNIFICANT IND 70042: NORMAL
SITE SITE: NORMAL
SITE SITE: NORMAL
SODIUM SERPL-SCNC: 138 MMOL/L (ref 135–145)
SOURCE SOURCE: NORMAL
SOURCE SOURCE: NORMAL
WBC # BLD AUTO: 6.9 K/UL (ref 4.8–10.8)

## 2017-08-09 PROCEDURE — A9270 NON-COVERED ITEM OR SERVICE: HCPCS | Performed by: INTERNAL MEDICINE

## 2017-08-09 PROCEDURE — A9270 NON-COVERED ITEM OR SERVICE: HCPCS | Performed by: HOSPITALIST

## 2017-08-09 PROCEDURE — 80048 BASIC METABOLIC PNL TOTAL CA: CPT

## 2017-08-09 PROCEDURE — 85007 BL SMEAR W/DIFF WBC COUNT: CPT

## 2017-08-09 PROCEDURE — 85014 HEMATOCRIT: CPT

## 2017-08-09 PROCEDURE — 36415 COLL VENOUS BLD VENIPUNCTURE: CPT

## 2017-08-09 PROCEDURE — 85018 HEMOGLOBIN: CPT

## 2017-08-09 PROCEDURE — 99239 HOSP IP/OBS DSCHRG MGMT >30: CPT | Performed by: INTERNAL MEDICINE

## 2017-08-09 PROCEDURE — 700111 HCHG RX REV CODE 636 W/ 250 OVERRIDE (IP): Performed by: INTERNAL MEDICINE

## 2017-08-09 PROCEDURE — 85027 COMPLETE CBC AUTOMATED: CPT

## 2017-08-09 PROCEDURE — 700102 HCHG RX REV CODE 250 W/ 637 OVERRIDE(OP): Performed by: INTERNAL MEDICINE

## 2017-08-09 PROCEDURE — 700102 HCHG RX REV CODE 250 W/ 637 OVERRIDE(OP): Performed by: HOSPITALIST

## 2017-08-09 RX ORDER — ATORVASTATIN CALCIUM 80 MG/1
80 TABLET, FILM COATED ORAL
Qty: 30 TAB | Refills: 1 | Status: SHIPPED | OUTPATIENT
Start: 2017-08-09 | End: 2017-09-15

## 2017-08-09 RX ADMIN — PREDNISONE 15 MG: 10 TABLET ORAL at 08:56

## 2017-08-09 RX ADMIN — HYDROXYCHLOROQUINE SULFATE 200 MG: 200 TABLET, FILM COATED ORAL at 08:56

## 2017-08-09 RX ADMIN — FOLIC ACID 1 MG: 1 TABLET ORAL at 08:56

## 2017-08-09 RX ADMIN — OXYBUTYNIN CHLORIDE 5 MG: 5 TABLET ORAL at 08:56

## 2017-08-09 RX ADMIN — OMEPRAZOLE 20 MG: 20 CAPSULE, DELAYED RELEASE ORAL at 08:56

## 2017-08-09 ASSESSMENT — ENCOUNTER SYMPTOMS
ABDOMINAL PAIN: 0
DIARRHEA: 1
BLOOD IN STOOL: 0
CHILLS: 0
WEAKNESS: 1
FEVER: 0

## 2017-08-09 ASSESSMENT — PAIN SCALES - GENERAL: PAINLEVEL_OUTOF10: 0

## 2017-08-09 NOTE — PROGRESS NOTES
Patient A+Ox4, ambulating in room independently. No distress. Voiding in BR, had normal soft brown BM today. Taking pills whole. Using call bell approp, in reach. Bed locked and in low position. RN and CNA numbers on updated whiteboard. Hourly rounding in place.

## 2017-08-09 NOTE — DISCHARGE SUMMARY
CHIEF COMPLAINT ON ADMISSION  Chief Complaint   Patient presents with   • Abnormal Labs   • Weakness   • Dizziness       CODE STATUS  Prior full    HPI & HOSPITAL COURSE  This is a 51 y.o. female here with  SLE and dehydration with recent c diff.  Admitted for hematochezia with melena and acute renal failure. Patient did require 2 units of PRBC transfusion. Patient was seen by GI Dr. Boone, status post colonoscopy with noted AVM status post lesion ablation. Patient's H&H appears to be stable now patient's renal function is back to baseline. At this time patient generalized weakness has improved and will be discharged to home.    Therefore, she is discharged in good and stable condition with close outpatient follow-up.    SPECIFIC OUTPATIENT FOLLOW-UP  GI in 3-4 weeks  Discharge clinic in one week    DISCHARGE PROBLEM LIST  Principal Problem:    Anemia (Chronic) POA: Yes  Active Problems:    Diarrhea POA: Yes      Overview: Cdiff PCR+, toxin AB NGT.      No WBC in stool.     ARF (acute renal failure) (CMS-[facility]) POA: Yes    SLE (systemic lupus erythematosus) (CMS-HCC) (Chronic) POA: Yes      Overview: stable    Chronic rheumatic arthritis (CMS-HCC) (Chronic) POA: Yes      Overview: - on home methotrexate, humira, prednisone            Immunosuppressed status (CMS-HCC) POA: Yes    Hypercholesterolemia (Chronic) POA: Yes    HTN (hypertension) (Chronic) POA: Yes      Overview: On admission: 180/100    Hyponatremia POA: Yes    Hypokalemia POA: Yes  Resolved Problems:    Sepsis (CMS-HCC) POA: Yes      FOLLOW UP  Future Appointments  Date Time Provider Department Center   8/18/2017 1:30 PM oRz Jarquin M.D. [facility]   9/27/2017 2:30 PM Yamilka Mcknight M.D. [facility] None     No follow-up provider specified.    MEDICATIONS ON DISCHARGE   Brandi Leggett   Home Medication Instructions GABRIELLA:[id number]    Printed on:08/09/17 8039   Medication Information                      Adalimumab 40 MG/0.8ML Pen-injector Kit  Inject  0.8 mL as instructed every 14 days.             allopurinol (ZYLOPRIM) 300 MG Tab  Take 1 Tab by mouth every day.             atorvastatin (LIPITOR) 80 MG tablet  Take 1 Tab by mouth every bedtime.             ergocalciferol (DRISDOL) 00273 UNIT capsule  Take 1 cap once weekly for 12 weeks.             folic acid (FOLVITE) 1 MG Tab  Take 1 mg by mouth every day.             hydroxychloroquine (PLAQUENIL) 200 MG Tab  Take 1 Tab by mouth 2 times a day.             lisinopril (PRINIVIL) 20 MG Tab  Take 20 mg by mouth every day.             omeprazole (PRILOSEC) 20 MG delayed-release capsule  Take 1 Cap by mouth every day.             oxybutynin (DITROPAN) 5 MG TABS  Take 5 mg by mouth 2 Times a Day.             predniSONE (DELTASONE) 5 MG Tab  Take 15 mg by mouth every day.                 DIET  No orders of the defined types were placed in this encounter.       ACTIVITY  As tolerated.  Weight bearing as tolerated      CONSULTATIONS  GI    PROCEDURES  Colonoscopy    LABORATORY  Lab Results   Component Value Date/Time    SODIUM 138 08/09/2017 03:22 AM    POTASSIUM 3.5* 08/09/2017 03:22 AM    CHLORIDE 113* 08/09/2017 03:22 AM    CO2 17* 08/09/2017 03:22 AM    GLUCOSE 84 08/09/2017 03:22 AM    BUN 11 08/09/2017 03:22 AM    CREATININE 0.84 08/09/2017 03:22 AM        Lab Results   Component Value Date/Time    WBC 6.9 08/09/2017 03:22 AM    HEMOGLOBIN 8.8* 08/09/2017 08:36 AM    HEMATOCRIT 27.5* 08/09/2017 08:36 AM    PLATELET COUNT 214 08/09/2017 03:22 AM        Total time of the discharge process exceeds 45 minutes

## 2017-08-09 NOTE — PROGRESS NOTES
Gastroenterology Progress Note     Author: Abdon Boone   Date & Time Created: 8/9/2017 11:16 AM    Interval History:  CC - AVM colon with anemia    Doing well th is am. No melena or hematochezia. No abd pain. Getting ready for d/c    Review of Systems:  Review of Systems   Constitutional: Positive for malaise/fatigue. Negative for fever and chills.   Gastrointestinal: Positive for diarrhea. Negative for abdominal pain, blood in stool and melena.   Neurological: Positive for weakness.       Physical Exam:  Physical Exam   Constitutional: She is oriented to person, place, and time. She appears well-developed and well-nourished.   obese   HENT:   Head: Atraumatic.   Eyes: EOM are normal. No scleral icterus.   Abdominal: Soft. She exhibits no distension. There is no tenderness. There is no rebound.   Neurological: She is alert and oriented to person, place, and time.   Skin: Skin is warm. No erythema.   Psychiatric: She has a normal mood and affect. Her behavior is normal. Thought content normal.       Labs:        Invalid input(s): [id number]      Recent Labs      08/07/17 0146 08/09/17   0322   SODIUM  139  138   POTASSIUM  4.0  3.5*   CHLORIDE  114*  113*   CO2  16*  17*   BUN  17  11   CREATININE  1.18  0.84   CALCIUM  7.6*  7.6*     Recent Labs      08/07/17 0146 08/09/17   0322   GLUCOSE  97  84     Recent Labs      08/06/17   1446  08/07/17 0146 08/08/17   2105  08/09/17   0322  08/09/17   0836   RBC  2.52*  2.36*   --    --   2.50*   --    HEMOGLOBIN  7.8*  6.9*   < >  8.1*  7.6*  8.8*   HEMATOCRIT  23.5*  22.0*   < >  25.4*  23.6*  27.5*   PLATELETCT  236  245   --    --   214   --    PROTHROMBTM   --   13.7   --    --    --    --    APTT   --   24.7   --    --    --    --    INR   --   1.02   --    --    --    --     < > = values in this interval not displayed.     Recent Labs      08/06/17   1446  08/07/17 0146 08/09/17   0322   WBC  5.9  6.2  6.9   NEUTSPOLYS   --    --   76.80*    LYMPHOCYTES   --    --   14.30*   MONOCYTES   --    --   0.90   EOSINOPHILS   --    --   0.00   BASOPHILS   --    --   3.50*     Hemodynamics:  Temp (24hrs), Av.8 °C (98.3 °F), Min:36.5 °C (97.7 °F), Max:37.1 °C (98.8 °F)  Temperature: 36.5 °C (97.7 °F)  Pulse  Av.6  Min: 64  Max: 114  Blood Pressure: 143/78 mmHg     Respiratory:    Respiration: 20, Pulse Oximetry: 98 %        RUL Breath Sounds: Clear, RML Breath Sounds: Clear, RLL Breath Sounds: Clear, ARNOL Breath Sounds: Clear, LLL Breath Sounds: Clear  Fluids:    Intake/Output Summary (Last 24 hours) at 17 1010  Last data filed at 17 0800   Gross per 24 hour   Intake   3870 ml   Output      0 ml   Net   3870 ml     Weight: 85 kg (187 lb 6.3 oz)  GI/Nutrition:  Orders Placed This Encounter   Procedures   • DIET ORDER     Standing Status: Standing      Number of Occurrences: 1      Standing Expiration Date:      Order Specific Question:  Diet:     Answer:  Regular [1]     Medical Decision Making, by Problem:  Active Hospital Problems    Diagnosis   • *Anemia [D64.9]   • Sepsis (CMS-HCC) [A41.9]   • ARF (acute renal failure) (CMS-HCC) [N17.9]   • Diarrhea [R19.7]   • Hypercholesterolemia [E78.00]   • Immunosuppressed status (CMS-HCC) [D89.9]   • Chronic rheumatic arthritis (CMS-HCC) [M06.9]   • SLE (systemic lupus erythematosus) (CMS-HCC) [M32.9]   • Hyponatremia [E87.1]   • Hypokalemia [E87.6]   • HTN (hypertension) [I10]     Impression and Plan    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 There was no active bleeding but cant say whether or not there was intermitant bleeding adding to her anemia. HgB stable. I will arrange follow up with us as an outpatient  2) Anemia  3) GERD - on PPI's  4) Acute renal failure - improving  5) Diarrhea, chronic  6) Obesity  7) Lupus  8) RA    Will sign off. I will arrange follow up with us    Abdon Boone MD      Core Measures

## 2017-08-09 NOTE — DISCHARGE INSTRUCTIONS
Discharge Instructions    Discharged to home by car with friend. Discharged via wheelchair, hospital escort: Yes.  Special equipment needed: Not Applicable    Be sure to schedule a follow-up appointment with your primary care doctor or any specialists as instructed.     Discharge Plan:   Influenza Vaccine Indication: Not indicated: Previously immunized this influenza season and > 8 years of age    I understand that a diet low in cholesterol, fat, and sodium is recommended for good health. Unless I have been given specific instructions below for another diet, I accept this instruction as my diet prescription.   Other diet: Cardiac    Special Instructions: None    · Is patient discharged on Warfarin / Coumadin?   No     · Is patient Post Blood Transfusion?  Yes  POST BLOOD TRANSFUSION INFORMATION (ADULT)    The purpose of blood transfusion is to correct anemia, low levels of blood clotting factors or to correct acute blood loss.   Blood transfusion is very safe but occasionally unexpected adverse reactions do occur. Most adverse reactions occur during transfusion, within one to two days following transfusion or one to two weeks following transfusion. Some adverse reactions can occur one to six months after transfusion and some even years later.             If any of the symptoms listed below happen to you during your transfusion,                                 please notify your nurse immediately.   · Fever or Chills  · Flushing of the Face  · Hives, rash or itching  · Difficulty in breathing or shortness of breath  · Pain or oozing of blood from the IV needle site  · Low back pain  · Nausea or vomiting  · Weakness or fainting  · Chest pain  · Blood in the urine  · Decreased frequency of urination    The above symptoms may also occur within 24 to 48 after transfusion; if so, notify your physician.     · Yellowing of the skin    This can happen one to six months after transfusion; if so, notify your  physician    Depression / Suicide Risk    As you are discharged from this [facility] facility, it is important to learn how to keep safe from harming yourself.    Recognize the warning signs:  · Abrupt changes in personality, positive or negative- including increase in energy   · Giving away possessions  · Change in eating patterns- significant weight changes-  positive or negative  · Change in sleeping patterns- unable to sleep or sleeping all the time   · Unwillingness or inability to communicate  · Depression  · Unusual sadness, discouragement and loneliness  · Talk of wanting to die  · Neglect of personal appearance   · Rebelliousness- reckless behavior  · Withdrawal from people/activities they love  · Confusion- inability to concentrate     If you or a loved one observes any of these behaviors or has concerns about self-harm, here's what you can do:  · Talk about it- your feelings and reasons for harming yourself  · Remove any means that you might use to hurt yourself (examples: pills, rope, extension cords, firearm)  · Get professional help from the community (Mental Health, Substance Abuse, psychological counseling)  · Do not be alone:Call your Safe Contact- someone whom you trust who will be there for you.  · Call your local CRISIS HOTLINE 198-4734 or 769-094-4087  · Call your local Children's Mobile Crisis Response Team [place] (361) 245-2172 or www.[employer]  · Call the toll free National Suicide Prevention Hotlines   · National Suicide Prevention Lifeline 996-276-QNCW (7242)  · National Hope Line Network 800-SUICIDE (290-4719)    Esophagitis  Esophagitis is inflammation of the esophagus. It can involve swelling, soreness, and pain in the esophagus. This condition can make it difficult and painful to swallow.  CAUSES   Most causes of esophagitis are not serious. Many different factors can cause esophagitis, including:  · Gastroesophageal reflux disease (GERD). This is when acid from your  stomach flows up into the esophagus.  · Recurrent vomiting.  · An allergic-type reaction.  · Certain medicines, especially those that come in large pills.  · Ingestion of harmful chemicals, such as household cleaning products.  · Heavy alcohol use.  · An infection of the esophagus.  · Radiation treatment for cancer.  · Certain diseases such as sarcoidosis, Crohn's disease, and scleroderma. These diseases may cause recurrent esophagitis.  SYMPTOMS   · Trouble swallowing.  · Painful swallowing.  · Chest pain.  · Difficulty breathing.  · Nausea.  · Vomiting.  · Abdominal pain.  DIAGNOSIS   Your caregiver will take your history and do a physical exam. Depending upon what your caregiver finds, certain tests may also be done, including:  · Barium X-ray. You will drink a solution that coats the esophagus, and X-rays will be taken.  · Endoscopy. A lighted tube is put down the esophagus so your caregiver can examine the area.  · Allergy tests. These can sometimes be arranged through follow-up visits.  TREATMENT   Treatment will depend on the cause of your esophagitis. In some cases, steroids or other medicines may be given to help relieve your symptoms or to treat the underlying cause of your condition. Medicines that may be recommended include:  · Viscous lidocaine, to soothe the esophagus.  · Antacids.  · Acid reducers.  · Proton pump inhibitors.  · Antiviral medicines for certain viral infections of the esophagus.  · Antifungal medicines for certain fungal infections of the esophagus.  · Antibiotic medicines, depending on the cause of the esophagitis.  HOME CARE INSTRUCTIONS   2. Avoid foods and drinks that seem to make your symptoms worse.  3. Eat small, frequent meals instead of large meals.  4. Avoid eating for the 3 hours prior to your bedtime.  5. If you have trouble taking pills, use a pill splitter to decrease the size and likelihood of the pill getting stuck or injuring the esophagus on the way down. Drinking  water after taking a pill also helps.  6. Stop smoking if you smoke.  7. Maintain a healthy weight.  8. Wear loose-fitting clothing. Do not wear anything tight around your waist that causes pressure on your stomach.  9. Raise the head of your bed 6 to 8 inches with wood blocks to help you sleep. Extra pillows will not help.  10. Only take over-the-counter or prescription medicines as directed by your caregiver.  SEEK IMMEDIATE MEDICAL CARE IF:  · You have severe chest pain that radiates into your arm, neck, or jaw.  · You feel sweaty, dizzy, or lightheaded.  · You have shortness of breath.  · You vomit blood.  · You have difficulty or pain with swallowing.  · You have bloody or black, tarry stools.  · You have a fever.  · You have a burning sensation in the chest more than 3 times a week for more than 2 weeks.  · You cannot swallow, drink, or eat.  · You drool because you cannot swallow your saliva.  MAKE SURE YOU:  · Understand these instructions.  · Will watch your condition.  · Will get help right away if you are not doing well or get worse.     This information is not intended to replace advice given to you by your health care provider. Make sure you discuss any questions you have with your health care provider.     Document Released: 01/25/2006 Document Revised: 01/08/2016 Document Reviewed: 04/13/2016  [employer] Interactive Patient Education ©2016 [employer]    Colonoscopy, Care After  Refer to this sheet in the next few weeks. These instructions provide you with information on caring for yourself after your procedure. Your health care provider may also give you more specific instructions. Your treatment has been planned according to current medical practices, but problems sometimes occur. Call your health care provider if you have any problems or questions after your procedure.  WHAT TO EXPECT AFTER THE PROCEDURE   After your procedure, it is typical to have the following:  · A small amount of blood in your  stool.  · Moderate amounts of gas and mild abdominal cramping or bloating.  HOME CARE INSTRUCTIONS  · Do not drive, operate machinery, or sign important documents for 24 hours.  · You may shower and resume your regular physical activities, but move at a slower pace for the first 24 hours.  · Take frequent rest periods for the first 24 hours.  · Walk around or put a warm pack on your abdomen to help reduce abdominal cramping and bloating.  · Drink enough fluids to keep your urine clear or pale yellow.  · You may resume your normal diet as instructed by your health care provider. Avoid heavy or fried foods that are hard to digest.  · Avoid drinking alcohol for 24 hours or as instructed by your health care provider.  · Only take over-the-counter or prescription medicines as directed by your health care provider.  · If a tissue sample (biopsy) was taken during your procedure:  ¨ Do not take aspirin or blood thinners for 7 days, or as instructed by your health care provider.  ¨ Do not drink alcohol for 7 days, or as instructed by your health care provider.  ¨ Eat soft foods for the first 24 hours.  SEEK MEDICAL CARE IF:  You have persistent spotting of blood in your stool 2-3 days after the procedure.  SEEK IMMEDIATE MEDICAL CARE IF:  · You have more than a small spotting of blood in your stool.  · You pass large blood clots in your stool.  · Your abdomen is swollen (distended).  · You have nausea or vomiting.  · You have a fever.  · You have increasing abdominal pain that is not relieved with medicine.     This information is not intended to replace advice given to you by your health care provider. Make sure you discuss any questions you have with your health care provider.     Document Released: 08/01/2005 Document Revised: 10/08/2014 Document Reviewed: 08/25/2014  [employer] Interactive Patient Education ©2016 [employer]    Esophagogastroduodenoscopy, Care After  Refer to this sheet in the next few weeks. These  instructions provide you with information about caring for yourself after your procedure. Your health care provider may also give you more specific instructions. Your treatment has been planned according to current medical practices, but problems sometimes occur. Call your health care provider if you have any problems or questions after your procedure.  WHAT TO EXPECT AFTER THE PROCEDURE  After your procedure, it is typical to feel:  · Soreness in your throat.  · Pain with swallowing.  · Sick to your stomach (nauseous).  · Bloated.  · Dizzy.  · Fatigued.  HOME CARE INSTRUCTIONS  · Do not eat or drink anything until the numbing medicine (local anesthetic) has worn off and your gag reflex has returned. You will know that the local anesthetic has worn off when you can swallow comfortably.  · Do not drive or operate machinery until directed by your health care provider.  · Take medicines only as directed by your health care provider.  SEEK MEDICAL CARE IF:   · You cannot stop coughing.  · You are not urinating at all or less than usual.  SEEK IMMEDIATE MEDICAL CARE IF:  · You have difficulty swallowing.  · You cannot eat or drink.  · You have worsening throat or chest pain.  · You have dizziness or lightheadedness or you faint.  · You have nausea or vomiting.  · You have chills.  · You have a fever.  · You have severe abdominal pain.  · You have black, tarry, or bloody stools.     This information is not intended to replace advice given to you by your health care provider. Make sure you discuss any questions you have with your health care provider.     Document Released: 12/04/2013 Document Revised: 01/08/2016 Document Reviewed: 12/04/2013  [employer] Interactive Patient Education ©2016 [employer]    Blood Transfusion   A blood transfusion is a procedure that gives you donated blood through an IV tube. You may need blood because of illness, surgery, or injury. The blood may come from a donor. The blood may also be your  own blood that you donated earlier.  The blood you get is made up of different types of cells. You may get:   · Red blood cells. These carry oxygen and replace lost blood.    · Platelets. These control bleeding.    · Plasma. This helps blood to clot.  If you have a clotting disorder, you may also get other types of blood products.   BEFORE THE PROCEDURE  · You may have a blood test. This finds out what type of blood you have. It also finds out what kind of blood your body will accept.    · If you are going to have a planned surgery, you may donate your own blood. This is done in case you need to have a transfusion.    · If you have had an allergic transfusion reaction before, you may be given medicine to help prevent a reaction. Take this medicine only as told by your doctor.  · You will have your temperature, blood pressure, and pulse checked.  PROCEDURE   · An IV will be started in your hand or arm.    · The bag of donated blood will be attached to your IV and run into your vein.    · A doctor will regularly check your temperature, blood pressure, and pulse during the procedure. This is done to find any early signs of a transfusion reaction.  · If you have any signs or symptoms of a reaction, the procedure may be stopped and you may be given medicine.    · When the transfusion is over, your IV will be removed.    · Pressure may be applied to the IV site for a few minutes.    · A bandage (dressing) will be applied.    The procedure may vary among doctors and hospitals.   AFTER THE PROCEDURE  · Your blood pressure, temperature, and pulse will be checked regularly.     This information is not intended to replace advice given to you by your health care provider. Make sure you discuss any questions you have with your health care provider.     Document Released: 03/16/2010 Document Revised: 01/08/2016 Document Reviewed: 10/28/2015  [employer] Interactive Patient Education ©2016 [employer]    Sepsis, Adult  Sepsis is a  serious infection of your blood or tissues that affects your whole body. The infection that causes sepsis may be bacterial, viral, fungal, or parasitic. Sepsis may be life threatening. Sepsis can cause your blood pressure to drop. This may result in shock. Shock causes your central nervous system and your organs to stop working correctly.   RISK FACTORS  Sepsis can happen in anyone, but it is more likely to happen in people who have weakened immune systems.  SIGNS AND SYMPTOMS   Symptoms of sepsis can include:  · Fever or low body temperature (hypothermia).  · Rapid breathing (hyperventilation).  · Chills.  · Rapid heartbeat (tachycardia).  · Confusion or light-headedness.  · Trouble breathing.  · Urinating much less than usual.  · Cool, clammy skin or red, flushed skin.  · Other problems with the heart, kidneys, or brain.  DIAGNOSIS   Your health care provider will likely do tests to look for an infection, to see if the infection has spread to your blood, and to see how serious your condition is. Tests can include:  · Blood tests, including cultures of your blood.  · Cultures of other fluids from your body, such as:  ¨ Urine.  ¨ Pus from wounds.  ¨ Mucus coughed up from your lungs.  · Urine tests other than cultures.  · X-ray exams or other imaging tests.  TREATMENT   Treatment will begin with elimination of the source of infection. If your sepsis is likely caused by a bacterial or fungal infection, you will be given antibiotic or antifungal medicines.  You may also receive:  · Oxygen.  · Fluids through an IV tube.  · Medicines to increase your blood pressure.  · A machine to clean your blood (dialysis) if your kidneys fail.  · A machine to help you breathe if your lungs fail.  SEEK IMMEDIATE MEDICAL CARE IF:  You get an infection or develop any of the signs and symptoms of sepsis after surgery or a hospitalization.     This information is not intended to replace advice given to you by your health care provider.  Make sure you discuss any questions you have with your health care provider.     Document Released: 09/15/2004 Document Revised: 05/03/2016 Document Reviewed: 08/25/2014  [employer] Interactive Patient Education ©2016 [employer]

## 2017-08-09 NOTE — DISCHARGE PLANNING
BARBARA scheduled [facility]TM transport home as requested. Pt will be picked up at 1300, transported by Amol [facility]ONA   Bedside RN aware.

## 2017-08-09 NOTE — PROGRESS NOTES
[facility] Hospitalist Progress Note    Date of Service: 2017    Chief Complaint  51 y.o. female admitted 2017 with SLE and dehydration with recent c diff    Interval Problem Update     - status post colonoscopy denies any further melena or hematochezia, denies abdominal pain   - her hemoglobin trended down overnight. She has not had any overt GI bleeding. She has not noticed any melena. Her appetite is actually improved, and she says she feels generally well. She follows with Yamilka Mcknight as an outpatient for rheumatology.     - transfused but Hgb still trending down. She denies seeing any blood in her stool but she tells me she had ulcers previously and lost blood that way. She had previously seen Dr. Canseco. Consulted Dr. Boone. Questions answered    Consultants/Specialty  Paolo, JORGE    Disposition  To home in 1-2 days when clinically improved        Review of Systems   Constitutional: Negative for fever, chills, malaise/fatigue and diaphoresis.   HENT: Negative for congestion.    Respiratory: Negative for cough and shortness of breath.    Cardiovascular: Negative for palpitations.   Gastrointestinal: Negative for nausea, vomiting and abdominal pain.   Genitourinary: Negative for dysuria.   Musculoskeletal: Negative for myalgias and back pain.   Skin: Negative for itching.   Neurological: Positive for weakness. Negative for dizziness, focal weakness and headaches.   Psychiatric/Behavioral: Negative for depression. The patient is not nervous/anxious.    All other systems reviewed and are negative.     Physical Exam  Laboratory/Imaging   Hemodynamics  Temp (24hrs), Av.1 °C (98.8 °F), Min:36.6 °C (97.9 °F), Max:37.8 °C (100 °F)   Temperature: 36.6 °C (97.9 °F)  Pulse  Av.7  Min: 64  Max: 114 Heart Rate (Monitored): (!) 108  Blood Pressure: 146/79 mmHg, NIBP: 138/91 mmHg      Respiratory      Respiration: 16, Pulse Oximetry: 95 %        RUL Breath Sounds: Clear, RML Breath Sounds: Clear, RLL  Breath Sounds: Clear, ARNOL Breath Sounds: Clear, LLL Breath Sounds: Clear    Fluids    Intake/Output Summary (Last 24 hours) at 08/08/17 1747  Last data filed at 08/08/17 1500   Gross per 24 hour   Intake    520 ml   Output    300 ml   Net    220 ml       Nutrition  Orders Placed This Encounter   Procedures   • DIET ORDER     Standing Status: Standing      Number of Occurrences: 1      Standing Expiration Date:      Order Specific Question:  Diet:     Answer:  Regular [1]     Physical Exam   Constitutional: She is oriented to person, place, and time. She appears well-developed and well-nourished. No distress.   HENT:   Head: Normocephalic and atraumatic.   Mouth/Throat: Oropharynx is clear and moist.   Eyes: Conjunctivae are normal. Pupils are equal, round, and reactive to light.   Neck: Normal range of motion. Neck supple.   Cardiovascular: Normal rate, regular rhythm, normal heart sounds and intact distal pulses.    Pulmonary/Chest: Effort normal and breath sounds normal. No respiratory distress. She has no wheezes.   Abdominal: Soft. Bowel sounds are normal. She exhibits no distension. There is no tenderness.   Musculoskeletal: Normal range of motion. She exhibits no edema.   Neurological: She is alert and oriented to person, place, and time. No cranial nerve deficit. Coordination normal.   Skin: Skin is dry. No erythema.   Psychiatric: Her behavior is normal. Judgment and thought content normal.   Vitals reviewed.      Recent Labs      08/06/17   0204  08/06/17   1446  08/07/17   0146  08/07/17   0938  08/08/17   1213   WBC  5.6  5.9  6.2   --    --    RBC  2.38*  2.52*  2.36*   --    --    HEMOGLOBIN  7.1*  7.8*  6.9*  7.8*   --    HEMATOCRIT  21.9*  23.5*  22.0*   --   27.0*   MCV  92.0  93.3  93.2   --    --    MCH  29.8  31.0  29.2   --    --    MCHC  32.4*  33.2*  31.4*   --    --    RDW  63.6*  65.7*  66.2*   --    --    PLATELETCT  240  236  245   --    --    MPV  9.8  9.3  9.5   --    --      Recent Labs       08/06/17   0204  08/07/17   0146   SODIUM  137  139   POTASSIUM  3.2*  4.0   CHLORIDE  109  114*   CO2  18*  16*   GLUCOSE  112*  97   BUN  21  17   CREATININE  1.60*  1.18   CALCIUM  7.6*  7.6*     Recent Labs      08/07/17   0146   APTT  24.7   INR  1.02                  Assessment/Plan     * Anemia (present on admission)  Assessment & Plan  s/p  Transfused 2 unit PRBCs  GI- Dr. Boone, colonoscopy with AVM , s/p lesion ablation  F/u h/h  Transfuse prn    Diarrhea (present on admission)  Assessment & Plan  Resolved    ARF (acute renal failure) (CMS-HCC) (present on admission)  Assessment & Plan  Virtually resolved   Lab Results   Component Value Date    BUN 17 08/07/2017    CREATININE 1.18 08/07/2017   Baseline Cr 0.9 or so  Maintaining hydration  Avoiding nephrotoxics: NSAIDs etc  Following Cr closely; to keep near baseline as possible     SLE (systemic lupus erythematosus) (CMS-HCC) (present on admission)  Assessment & Plan  On plaquenil and steroid    Chronic rheumatic arthritis (CMS-HCC) (present on admission)  Assessment & Plan  Continuing home meds    Immunosuppressed status (CMS-HCC) (present on admission)  Assessment & Plan  Due to RA    Hypercholesterolemia (present on admission)  Assessment & Plan  Started statin given ? ?  lipids    HTN (hypertension) (present on admission)  Assessment & Plan  SBP goal less than 140 mmHg  DBP goal less than 90 mmHg  PRN and antihypertensives to titrate by hospitalist towards goal  Most recent Blood Pressure: 116/74 mmHg     Hyponatremia (present on admission)  Assessment & Plan  Resolved  Goal > 135 mEq/L  Lab Results   Component Value Date/Time    SODIUM 139 08/07/2017 01:46 AM       Hypokalemia (present on admission)  Assessment & Plan  Resolved  Goal > 4 mEq/L  Lab Results   Component Value Date/Time    POTASSIUM 4.0 08/07/2017 01:46 AM         Labs reviewed, Medications reviewed and Radiology images reviewed        DVT Prophylaxis: Contraindicated - High  bleeding risk    Ulcer prophylaxis: Not indicated

## 2017-08-09 NOTE — PROGRESS NOTES
Sherlyn Fuller Fall Risk Assessment:     Last Known Fall: Within the last six months  Mobility: No limitations  Medications: Cardiovascular or central nervous system meds  Mental Status/LOC/Awareness: Awake, alert, and oriented to date, place, and person  Toileting Needs: No needs  Volume/Electrolyte Status: No problems  Communication/Sensory: Visual (Glasses)/hearing deficit  Behavior: Appropriate behavior  Sherlyn Fuller Fall Risk Total: 6  Fall Risk Level: NO RISK    Universal Fall Precautions:  clutter free and spill free environment, adequate lighting, use non-slip footwear, call light/belongings in reach    Fall Risk Level Interventions:   TRIAL (TELE 8, NEURO, MED JOLENE 5) Low Fall Risk Interventions  Place yellow fall risk ID band on patient: verified  Provide patient/family education based on risk assessment: verified  Educate patient/family to call staff for assistance when getting out of bed: verified  Place fall precaution signage outside patient door: verified      Patient Specific Interventions:     Medication: review medications with patient and family  Mental Status/LOC/Awareness: provide activity  Toileting: not applicable  Volume/Electrolyte Status: ensure IV fluids are appropriate  Communication/Sensory: update plan of care on whiteboard  Behavioral: engage patient in daily activities  Mobility: ensure bed is locked and in lowest position

## 2017-08-09 NOTE — CARE PLAN
Problem: Nutritional:  Goal: Achieve adequate nutritional intake  Patient will consume 50% of meals   Outcome: MET Date Met:  08/09/17  % of a regular diet

## 2017-08-09 NOTE — PROGRESS NOTES
Patient cleared for discharge by MD. IV removed, tele removed. Has follow up appointments, signed her discharge paperwork.  Denies any home meds or valuables in safe. Has signed script for Lipitor. Left the floor at 1306 with CNA and wheelchair, has scheduled transportation home from social work.

## 2017-08-09 NOTE — PROGRESS NOTES
Sherlyn Fuller Fall Risk Assessment:     Last Known Fall: Within the last six months  Mobility: No limitations  Medications: Cardiovascular or central nervous system meds  Mental Status/LOC/Awareness: Awake, alert, and oriented to date, place, and person  Toileting Needs: No needs  Volume/Electrolyte Status: No problems  Communication/Sensory: Visual (Glasses)/hearing deficit  Behavior: Appropriate behavior  Sherlyn Fuller Fall Risk Total: 6  Fall Risk Level: NO RISK    Universal Fall Precautions:  call light/belongings in reach, bed in low position and locked, wheelchairs and assistive devices out of sight, siderails up x 2, use non-slip footwear, adequate lighting, clutter free and spill free environment, educate on level of risk, educate to call for assistance    Fall Risk Level Interventions:   TRIAL (TELE 8, NEURO, MED JOLENE 5) Low Fall Risk Interventions  Place yellow fall risk ID band on patient: verified  Provide patient/family education based on risk assessment: verified  Educate patient/family to call staff for assistance when getting out of bed: verified  Place fall precaution signage outside patient door: verified      Patient Specific Interventions:     Medication: review medications with patient and family, assess for medications that can be discontinued or dosage decreased and limit combination of prn medications  Mental Status/LOC/Awareness: reorient patient, reinforce falls education, encourage family to stay with patient and check on patient hourly  Toileting: provide frquent toileting and instruct patient/family on the need to call for assistance when toileting  Volume/Electrolyte Status: ensure patient remains hydrated, advance diet as tolerated and ensure IV fluids are appropriate  Communication/Sensory: update plan of care on whiteboard and provide communication alternatives/  Behavioral: engage patient in daily activities  Mobility: schedule physical activity throughout the day and  ensure bed is locked and in lowest position

## 2017-08-16 NOTE — DOCUMENTATION QUERY
"DOCUMENTATION QUERY    PROVIDERS: Please select “Cosign w/ note”to reply to query.    To better represent the severity of illness of your patient, please review the following information and exercise your independent professional judgment in responding to this query.     There is conflicting documentation of sepsis in this chart.  Severe Sepsis with associated organ dysfunction Acute Kidney Failure is documented in the History and Physical. Progress Note dated 8/6/17 states \"SIRS due to dehydration, not sepsis\".  Sepsis is then documented in the Discharge Summary under Resolved Problems.  Based upon the clinical findings, risk factors, and treatment, can this diagnosis be clarified?    Please select all that apply:   • Severe sepsis with associated organ dysfunction  • Sepsis without associated organ dysfunction  • Sepsis has been ruled out  • SIRS that is unrelated to any infection  • SIRS of non-infectious origin with acute organ dysfunction  • Other explanation of clinical findings (please specify)  • Unable to determine      The medical record reflects the following:   Clinical Findings  lactic acid 3.5, WBC 4.7, temp 97F, BP 91/58, pulse 98 on admission   Treatment  septic protocol, IV fluids, antibiotics, transfusion   Risk Factors  dehydration, SLE, AVM colon w/hemorrhage & associated chronic blood loss anemia, anemia of chronic disease, hyponatremia, hyperkalemia, chronic immunosuppression therapy   Location within medical record  History and Physical, Progress Notes and Discharge Summary     Thank you,   Shabana Steel, [facility]  Inpatient   Silent Herdsman  [email or web address]          "

## 2017-09-15 ENCOUNTER — HOSPITAL ENCOUNTER (INPATIENT)
Facility: MEDICAL CENTER | Age: 52
LOS: 3 days | DRG: 812 | End: 2017-09-18
Attending: EMERGENCY MEDICINE | Admitting: HOSPITALIST
Payer: MEDICAID

## 2017-09-15 ENCOUNTER — RESOLUTE PROFESSIONAL BILLING HOSPITAL PROF FEE (OUTPATIENT)
Dept: HOSPITALIST | Facility: MEDICAL CENTER | Age: 52
End: 2017-09-15
Payer: MEDICAID

## 2017-09-15 ENCOUNTER — APPOINTMENT (OUTPATIENT)
Dept: RADIOLOGY | Facility: MEDICAL CENTER | Age: 52
DRG: 812 | End: 2017-09-15
Attending: EMERGENCY MEDICINE
Payer: MEDICAID

## 2017-09-15 DIAGNOSIS — K70.9 ALCOHOLIC LIVER DISEASE (HCC): ICD-10-CM

## 2017-09-15 DIAGNOSIS — D50.0 IRON DEFICIENCY ANEMIA DUE TO CHRONIC BLOOD LOSS: ICD-10-CM

## 2017-09-15 DIAGNOSIS — E87.6 HYPOKALEMIA: ICD-10-CM

## 2017-09-15 DIAGNOSIS — K92.2 GASTROINTESTINAL HEMORRHAGE, UNSPECIFIED GASTROINTESTINAL HEMORRHAGE TYPE: ICD-10-CM

## 2017-09-15 PROBLEM — D64.9 ANEMIA REQUIRING TRANSFUSIONS: Status: ACTIVE | Noted: 2017-09-15

## 2017-09-15 LAB
ABO GROUP BLD: NORMAL
ALBUMIN SERPL BCP-MCNC: 3.4 G/DL (ref 3.2–4.9)
ALBUMIN/GLOB SERPL: 1.3 G/DL
ALP SERPL-CCNC: 206 U/L (ref 30–99)
ALT SERPL-CCNC: 15 U/L (ref 2–50)
ANION GAP SERPL CALC-SCNC: 23 MMOL/L (ref 0–11.9)
APTT PPP: 28.9 SEC (ref 24.7–36)
AST SERPL-CCNC: 67 U/L (ref 12–45)
BARCODED ABORH UBTYP: 5100
BARCODED PRD CODE UBPRD: NORMAL
BARCODED UNIT NUM UBUNT: NORMAL
BASOPHILS # BLD AUTO: 0.5 % (ref 0–1.8)
BASOPHILS # BLD: 0.05 K/UL (ref 0–0.12)
BILIRUB SERPL-MCNC: 2.7 MG/DL (ref 0.1–1.5)
BLD GP AB SCN SERPL QL: NORMAL
BUN SERPL-MCNC: 14 MG/DL (ref 8–22)
CALCIUM SERPL-MCNC: 7 MG/DL (ref 8.5–10.5)
CHLORIDE SERPL-SCNC: 90 MMOL/L (ref 96–112)
CO2 SERPL-SCNC: 18 MMOL/L (ref 20–33)
COMPONENT R 8504R: NORMAL
CREAT SERPL-MCNC: 1.53 MG/DL (ref 0.5–1.4)
EKG IMPRESSION: NORMAL
EOSINOPHIL # BLD AUTO: 0.01 K/UL (ref 0–0.51)
EOSINOPHIL NFR BLD: 0.1 % (ref 0–6.9)
ERYTHROCYTE [DISTWIDTH] IN BLOOD BY AUTOMATED COUNT: 53.3 FL (ref 35.9–50)
GFR SERPL CREATININE-BSD FRML MDRD: 36 ML/MIN/1.73 M 2
GLOBULIN SER CALC-MCNC: 2.6 G/DL (ref 1.9–3.5)
GLUCOSE SERPL-MCNC: 117 MG/DL (ref 65–99)
HCT VFR BLD AUTO: 19.1 % (ref 37–47)
HGB BLD-MCNC: 6.4 G/DL (ref 12–16)
IMM GRANULOCYTES # BLD AUTO: 0.06 K/UL (ref 0–0.11)
IMM GRANULOCYTES NFR BLD AUTO: 0.6 % (ref 0–0.9)
INR PPP: 1.12 (ref 0.87–1.13)
LIPASE SERPL-CCNC: 36 U/L (ref 11–82)
LYMPHOCYTES # BLD AUTO: 0.62 K/UL (ref 1–4.8)
LYMPHOCYTES NFR BLD: 6.1 % (ref 22–41)
MCH RBC QN AUTO: 30.3 PG (ref 27–33)
MCHC RBC AUTO-ENTMCNC: 33.5 G/DL (ref 33.6–35)
MCV RBC AUTO: 90.5 FL (ref 81.4–97.8)
MONOCYTES # BLD AUTO: 0.98 K/UL (ref 0–0.85)
MONOCYTES NFR BLD AUTO: 9.6 % (ref 0–13.4)
NEUTROPHILS # BLD AUTO: 8.45 K/UL (ref 2–7.15)
NEUTROPHILS NFR BLD: 83.1 % (ref 44–72)
NRBC # BLD AUTO: 0 K/UL
NRBC BLD AUTO-RTO: 0 /100 WBC
PLATELET # BLD AUTO: 237 K/UL (ref 164–446)
PMV BLD AUTO: 10.7 FL (ref 9–12.9)
POTASSIUM SERPL-SCNC: 2.5 MMOL/L (ref 3.6–5.5)
PRODUCT TYPE UPROD: NORMAL
PROT SERPL-MCNC: 6 G/DL (ref 6–8.2)
PROTHROMBIN TIME: 14.8 SEC (ref 12–14.6)
RBC # BLD AUTO: 2.11 M/UL (ref 4.2–5.4)
RH BLD: NORMAL
SODIUM SERPL-SCNC: 131 MMOL/L (ref 135–145)
TROPONIN I SERPL-MCNC: <0.01 NG/ML (ref 0–0.04)
UNIT STATUS USTAT: NORMAL
WBC # BLD AUTO: 10.2 K/UL (ref 4.8–10.8)

## 2017-09-15 PROCEDURE — 96375 TX/PRO/DX INJ NEW DRUG ADDON: CPT

## 2017-09-15 PROCEDURE — 86901 BLOOD TYPING SEROLOGIC RH(D): CPT

## 2017-09-15 PROCEDURE — P9016 RBC LEUKOCYTES REDUCED: HCPCS

## 2017-09-15 PROCEDURE — 99285 EMERGENCY DEPT VISIT HI MDM: CPT

## 2017-09-15 PROCEDURE — 71010 DX-CHEST-LIMITED (1 VIEW): CPT

## 2017-09-15 PROCEDURE — 770020 HCHG ROOM/CARE - TELE (206)

## 2017-09-15 PROCEDURE — 83690 ASSAY OF LIPASE: CPT

## 2017-09-15 PROCEDURE — 700111 HCHG RX REV CODE 636 W/ 250 OVERRIDE (IP): Performed by: EMERGENCY MEDICINE

## 2017-09-15 PROCEDURE — 87086 URINE CULTURE/COLONY COUNT: CPT

## 2017-09-15 PROCEDURE — 86900 BLOOD TYPING SEROLOGIC ABO: CPT

## 2017-09-15 PROCEDURE — 86850 RBC ANTIBODY SCREEN: CPT

## 2017-09-15 PROCEDURE — 93005 ELECTROCARDIOGRAM TRACING: CPT | Performed by: EMERGENCY MEDICINE

## 2017-09-15 PROCEDURE — 84484 ASSAY OF TROPONIN QUANT: CPT

## 2017-09-15 PROCEDURE — 36430 TRANSFUSION BLD/BLD COMPNT: CPT

## 2017-09-15 PROCEDURE — 85730 THROMBOPLASTIN TIME PARTIAL: CPT

## 2017-09-15 PROCEDURE — 93005 ELECTROCARDIOGRAM TRACING: CPT

## 2017-09-15 PROCEDURE — 36415 COLL VENOUS BLD VENIPUNCTURE: CPT

## 2017-09-15 PROCEDURE — 700105 HCHG RX REV CODE 258: Performed by: EMERGENCY MEDICINE

## 2017-09-15 PROCEDURE — 96365 THER/PROPH/DIAG IV INF INIT: CPT

## 2017-09-15 PROCEDURE — 96361 HYDRATE IV INFUSION ADD-ON: CPT

## 2017-09-15 PROCEDURE — 99223 1ST HOSP IP/OBS HIGH 75: CPT | Performed by: HOSPITALIST

## 2017-09-15 PROCEDURE — 80053 COMPREHEN METABOLIC PANEL: CPT

## 2017-09-15 PROCEDURE — 86923 COMPATIBILITY TEST ELECTRIC: CPT

## 2017-09-15 PROCEDURE — 85025 COMPLETE CBC W/AUTO DIFF WBC: CPT

## 2017-09-15 PROCEDURE — 85610 PROTHROMBIN TIME: CPT

## 2017-09-15 RX ORDER — SODIUM CHLORIDE 9 MG/ML
1000 INJECTION, SOLUTION INTRAVENOUS ONCE
Status: COMPLETED | OUTPATIENT
Start: 2017-09-15 | End: 2017-09-16

## 2017-09-15 RX ORDER — PANTOPRAZOLE SODIUM 40 MG/10ML
40 INJECTION, POWDER, LYOPHILIZED, FOR SOLUTION INTRAVENOUS ONCE
Status: COMPLETED | OUTPATIENT
Start: 2017-09-15 | End: 2017-09-16

## 2017-09-15 RX ORDER — POTASSIUM CHLORIDE 7.45 MG/ML
10 INJECTION INTRAVENOUS
Status: COMPLETED | OUTPATIENT
Start: 2017-09-15 | End: 2017-09-16

## 2017-09-15 RX ORDER — ONDANSETRON 2 MG/ML
4 INJECTION INTRAMUSCULAR; INTRAVENOUS
Status: DISCONTINUED | OUTPATIENT
Start: 2017-09-15 | End: 2017-09-18 | Stop reason: HOSPADM

## 2017-09-15 RX ADMIN — SODIUM CHLORIDE 1000 ML: 9 INJECTION, SOLUTION INTRAVENOUS at 22:27

## 2017-09-15 RX ADMIN — POTASSIUM CHLORIDE 10 MEQ: 7.46 INJECTION, SOLUTION INTRAVENOUS at 23:36

## 2017-09-15 RX ADMIN — ONDANSETRON 4 MG: 2 INJECTION INTRAMUSCULAR; INTRAVENOUS at 23:12

## 2017-09-16 PROBLEM — K70.10 ALCOHOLIC HEPATITIS: Status: ACTIVE | Noted: 2017-09-16

## 2017-09-16 PROBLEM — E87.20 METABOLIC ACIDOSIS: Status: ACTIVE | Noted: 2017-09-16

## 2017-09-16 PROBLEM — Z65.9 OTHER SOCIAL STRESSOR: Status: ACTIVE | Noted: 2017-09-16

## 2017-09-16 PROBLEM — N39.0 UTI (URINARY TRACT INFECTION): Status: ACTIVE | Noted: 2017-09-16

## 2017-09-16 LAB
ALBUMIN SERPL BCP-MCNC: 3.1 G/DL (ref 3.2–4.9)
ALBUMIN/GLOB SERPL: 1.3 G/DL
ALP SERPL-CCNC: 176 U/L (ref 30–99)
ALT SERPL-CCNC: 11 U/L (ref 2–50)
ANION GAP SERPL CALC-SCNC: 13 MMOL/L (ref 0–11.9)
ANION GAP SERPL CALC-SCNC: 18 MMOL/L (ref 0–11.9)
APPEARANCE UR: ABNORMAL
AST SERPL-CCNC: 47 U/L (ref 12–45)
BACTERIA #/AREA URNS HPF: ABNORMAL /HPF
BASOPHILS # BLD AUTO: 1.1 % (ref 0–1.8)
BASOPHILS # BLD: 0.06 K/UL (ref 0–0.12)
BILIRUB SERPL-MCNC: 3.9 MG/DL (ref 0.1–1.5)
BILIRUB UR QL STRIP.AUTO: ABNORMAL
BUN SERPL-MCNC: 14 MG/DL (ref 8–22)
BUN SERPL-MCNC: 16 MG/DL (ref 8–22)
CALCIUM SERPL-MCNC: 6.3 MG/DL (ref 8.5–10.5)
CALCIUM SERPL-MCNC: 6.4 MG/DL (ref 8.5–10.5)
CHLORIDE SERPL-SCNC: 94 MMOL/L (ref 96–112)
CHLORIDE SERPL-SCNC: 98 MMOL/L (ref 96–112)
CO2 SERPL-SCNC: 20 MMOL/L (ref 20–33)
CO2 SERPL-SCNC: 20 MMOL/L (ref 20–33)
COLOR UR: ABNORMAL
CREAT SERPL-MCNC: 1.51 MG/DL (ref 0.5–1.4)
CREAT SERPL-MCNC: 1.63 MG/DL (ref 0.5–1.4)
CULTURE IF INDICATED INDCX: YES UA CULTURE
EKG IMPRESSION: NORMAL
EKG IMPRESSION: NORMAL
EOSINOPHIL # BLD AUTO: 0.04 K/UL (ref 0–0.51)
EOSINOPHIL NFR BLD: 0.7 % (ref 0–6.9)
EPI CELLS #/AREA URNS HPF: ABNORMAL /HPF
ERYTHROCYTE [DISTWIDTH] IN BLOOD BY AUTOMATED COUNT: 54.1 FL (ref 35.9–50)
GFR SERPL CREATININE-BSD FRML MDRD: 33 ML/MIN/1.73 M 2
GFR SERPL CREATININE-BSD FRML MDRD: 37 ML/MIN/1.73 M 2
GLOBULIN SER CALC-MCNC: 2.3 G/DL (ref 1.9–3.5)
GLUCOSE SERPL-MCNC: 102 MG/DL (ref 65–99)
GLUCOSE SERPL-MCNC: 159 MG/DL (ref 65–99)
GLUCOSE UR STRIP.AUTO-MCNC: NEGATIVE MG/DL
HCT VFR BLD AUTO: 21.8 % (ref 37–47)
HGB BLD-MCNC: 7.3 G/DL (ref 12–16)
HGB BLD-MCNC: 7.6 G/DL (ref 12–16)
HYALINE CASTS #/AREA URNS LPF: ABNORMAL /LPF
IMM GRANULOCYTES # BLD AUTO: 0.02 K/UL (ref 0–0.11)
IMM GRANULOCYTES NFR BLD AUTO: 0.4 % (ref 0–0.9)
INR PPP: 1.06 (ref 0.87–1.13)
KETONES UR STRIP.AUTO-MCNC: NEGATIVE MG/DL
LEUKOCYTE ESTERASE UR QL STRIP.AUTO: ABNORMAL
LYMPHOCYTES # BLD AUTO: 0.89 K/UL (ref 1–4.8)
LYMPHOCYTES NFR BLD: 15.7 % (ref 22–41)
MAGNESIUM SERPL-MCNC: 0.7 MG/DL (ref 1.5–2.5)
MAGNESIUM SERPL-MCNC: 2.5 MG/DL (ref 1.5–2.5)
MCH RBC QN AUTO: 30.3 PG (ref 27–33)
MCHC RBC AUTO-ENTMCNC: 33.5 G/DL (ref 33.6–35)
MCV RBC AUTO: 90.5 FL (ref 81.4–97.8)
MICRO URNS: ABNORMAL
MONOCYTES # BLD AUTO: 0.56 K/UL (ref 0–0.85)
MONOCYTES NFR BLD AUTO: 9.9 % (ref 0–13.4)
NEUTROPHILS # BLD AUTO: 4.11 K/UL (ref 2–7.15)
NEUTROPHILS NFR BLD: 72.2 % (ref 44–72)
NITRITE UR QL STRIP.AUTO: NEGATIVE
NRBC # BLD AUTO: 0 K/UL
NRBC BLD AUTO-RTO: 0 /100 WBC
PH UR STRIP.AUTO: 5 [PH]
PHOSPHATE SERPL-MCNC: 4 MG/DL (ref 2.5–4.5)
PLATELET # BLD AUTO: 168 K/UL (ref 164–446)
PMV BLD AUTO: 9.8 FL (ref 9–12.9)
POTASSIUM SERPL-SCNC: 2.9 MMOL/L (ref 3.6–5.5)
POTASSIUM SERPL-SCNC: 3.4 MMOL/L (ref 3.6–5.5)
PROT SERPL-MCNC: 5.4 G/DL (ref 6–8.2)
PROT UR QL STRIP: NEGATIVE MG/DL
PROTHROMBIN TIME: 14.1 SEC (ref 12–14.6)
RBC # BLD AUTO: 2.41 M/UL (ref 4.2–5.4)
RBC # URNS HPF: ABNORMAL /HPF
RBC UR QL AUTO: NEGATIVE
SODIUM SERPL-SCNC: 131 MMOL/L (ref 135–145)
SODIUM SERPL-SCNC: 132 MMOL/L (ref 135–145)
SP GR UR STRIP.AUTO: 1.01
UROBILINOGEN UR STRIP.AUTO-MCNC: 1 MG/DL
WBC # BLD AUTO: 5.7 K/UL (ref 4.8–10.8)
WBC #/AREA URNS HPF: ABNORMAL /HPF

## 2017-09-16 PROCEDURE — 51798 US URINE CAPACITY MEASURE: CPT

## 2017-09-16 PROCEDURE — 700102 HCHG RX REV CODE 250 W/ 637 OVERRIDE(OP): Performed by: INTERNAL MEDICINE

## 2017-09-16 PROCEDURE — 770020 HCHG ROOM/CARE - TELE (206)

## 2017-09-16 PROCEDURE — HZ2ZZZZ DETOXIFICATION SERVICES FOR SUBSTANCE ABUSE TREATMENT: ICD-10-PCS | Performed by: HOSPITALIST

## 2017-09-16 PROCEDURE — 93010 ELECTROCARDIOGRAM REPORT: CPT | Mod: 77 | Performed by: INTERNAL MEDICINE

## 2017-09-16 PROCEDURE — 700111 HCHG RX REV CODE 636 W/ 250 OVERRIDE (IP): Performed by: INTERNAL MEDICINE

## 2017-09-16 PROCEDURE — C9113 INJ PANTOPRAZOLE SODIUM, VIA: HCPCS | Performed by: INTERNAL MEDICINE

## 2017-09-16 PROCEDURE — 700102 HCHG RX REV CODE 250 W/ 637 OVERRIDE(OP): Performed by: HOSPITALIST

## 2017-09-16 PROCEDURE — C9113 INJ PANTOPRAZOLE SODIUM, VIA: HCPCS | Performed by: EMERGENCY MEDICINE

## 2017-09-16 PROCEDURE — 99233 SBSQ HOSP IP/OBS HIGH 50: CPT | Performed by: INTERNAL MEDICINE

## 2017-09-16 PROCEDURE — 82330 ASSAY OF CALCIUM: CPT

## 2017-09-16 PROCEDURE — 96361 HYDRATE IV INFUSION ADD-ON: CPT

## 2017-09-16 PROCEDURE — 700105 HCHG RX REV CODE 258: Performed by: HOSPITALIST

## 2017-09-16 PROCEDURE — 93010 ELECTROCARDIOGRAM REPORT: CPT | Performed by: INTERNAL MEDICINE

## 2017-09-16 PROCEDURE — 85025 COMPLETE CBC W/AUTO DIFF WBC: CPT | Mod: 91

## 2017-09-16 PROCEDURE — C9113 INJ PANTOPRAZOLE SODIUM, VIA: HCPCS | Performed by: HOSPITALIST

## 2017-09-16 PROCEDURE — 85610 PROTHROMBIN TIME: CPT

## 2017-09-16 PROCEDURE — 96376 TX/PRO/DX INJ SAME DRUG ADON: CPT

## 2017-09-16 PROCEDURE — 36430 TRANSFUSION BLD/BLD COMPNT: CPT

## 2017-09-16 PROCEDURE — 700111 HCHG RX REV CODE 636 W/ 250 OVERRIDE (IP): Performed by: EMERGENCY MEDICINE

## 2017-09-16 PROCEDURE — 700111 HCHG RX REV CODE 636 W/ 250 OVERRIDE (IP): Performed by: NURSE PRACTITIONER

## 2017-09-16 PROCEDURE — 700105 HCHG RX REV CODE 258: Performed by: INTERNAL MEDICINE

## 2017-09-16 PROCEDURE — 36415 COLL VENOUS BLD VENIPUNCTURE: CPT

## 2017-09-16 PROCEDURE — 96366 THER/PROPH/DIAG IV INF ADDON: CPT

## 2017-09-16 PROCEDURE — 86923 COMPATIBILITY TEST ELECTRIC: CPT

## 2017-09-16 PROCEDURE — 80048 BASIC METABOLIC PNL TOTAL CA: CPT

## 2017-09-16 PROCEDURE — 84100 ASSAY OF PHOSPHORUS: CPT

## 2017-09-16 PROCEDURE — 30233N1 TRANSFUSION OF NONAUTOLOGOUS RED BLOOD CELLS INTO PERIPHERAL VEIN, PERCUTANEOUS APPROACH: ICD-10-PCS | Performed by: HOSPITALIST

## 2017-09-16 PROCEDURE — 80053 COMPREHEN METABOLIC PANEL: CPT

## 2017-09-16 PROCEDURE — P9016 RBC LEUKOCYTES REDUCED: HCPCS

## 2017-09-16 PROCEDURE — 85018 HEMOGLOBIN: CPT

## 2017-09-16 PROCEDURE — 81001 URINALYSIS AUTO W/SCOPE: CPT

## 2017-09-16 PROCEDURE — 83735 ASSAY OF MAGNESIUM: CPT | Mod: 91

## 2017-09-16 PROCEDURE — 700101 HCHG RX REV CODE 250: Performed by: HOSPITALIST

## 2017-09-16 PROCEDURE — A9270 NON-COVERED ITEM OR SERVICE: HCPCS | Performed by: INTERNAL MEDICINE

## 2017-09-16 PROCEDURE — 83970 ASSAY OF PARATHORMONE: CPT

## 2017-09-16 PROCEDURE — A9270 NON-COVERED ITEM OR SERVICE: HCPCS | Performed by: HOSPITALIST

## 2017-09-16 PROCEDURE — 93005 ELECTROCARDIOGRAM TRACING: CPT | Performed by: NURSE PRACTITIONER

## 2017-09-16 PROCEDURE — 82306 VITAMIN D 25 HYDROXY: CPT

## 2017-09-16 PROCEDURE — 700111 HCHG RX REV CODE 636 W/ 250 OVERRIDE (IP): Performed by: HOSPITALIST

## 2017-09-16 RX ORDER — PANTOPRAZOLE SODIUM 40 MG/10ML
40 INJECTION, POWDER, LYOPHILIZED, FOR SOLUTION INTRAVENOUS 2 TIMES DAILY
Status: DISCONTINUED | OUTPATIENT
Start: 2017-09-16 | End: 2017-09-17 | Stop reason: ALTCHOICE

## 2017-09-16 RX ORDER — SODIUM CHLORIDE AND POTASSIUM CHLORIDE 300; 900 MG/100ML; MG/100ML
INJECTION, SOLUTION INTRAVENOUS CONTINUOUS
Status: DISCONTINUED | OUTPATIENT
Start: 2017-09-16 | End: 2017-09-17

## 2017-09-16 RX ORDER — LORAZEPAM 2 MG/ML
1 INJECTION INTRAMUSCULAR
Status: DISCONTINUED | OUTPATIENT
Start: 2017-09-16 | End: 2017-09-18 | Stop reason: HOSPADM

## 2017-09-16 RX ORDER — DIAZEPAM 5 MG/ML
5 INJECTION, SOLUTION INTRAMUSCULAR; INTRAVENOUS
Status: DISCONTINUED | OUTPATIENT
Start: 2017-09-17 | End: 2017-09-17

## 2017-09-16 RX ORDER — PREDNISONE 5 MG/1
15 TABLET ORAL DAILY
Status: DISCONTINUED | OUTPATIENT
Start: 2017-09-16 | End: 2017-09-18 | Stop reason: HOSPADM

## 2017-09-16 RX ORDER — LISINOPRIL 20 MG/1
20 TABLET ORAL DAILY
Status: DISCONTINUED | OUTPATIENT
Start: 2017-09-16 | End: 2017-09-18 | Stop reason: HOSPADM

## 2017-09-16 RX ORDER — MAGNESIUM SULFATE HEPTAHYDRATE 40 MG/ML
4 INJECTION, SOLUTION INTRAVENOUS ONCE
Status: COMPLETED | OUTPATIENT
Start: 2017-09-16 | End: 2017-09-16

## 2017-09-16 RX ORDER — LORAZEPAM 0.5 MG/1
0.5 TABLET ORAL EVERY 4 HOURS PRN
Status: DISCONTINUED | OUTPATIENT
Start: 2017-09-16 | End: 2017-09-18 | Stop reason: HOSPADM

## 2017-09-16 RX ORDER — BISACODYL 10 MG
10 SUPPOSITORY, RECTAL RECTAL
Status: DISCONTINUED | OUTPATIENT
Start: 2017-09-15 | End: 2017-09-18 | Stop reason: HOSPADM

## 2017-09-16 RX ORDER — LORAZEPAM 1 MG/1
1 TABLET ORAL EVERY 4 HOURS PRN
Status: DISCONTINUED | OUTPATIENT
Start: 2017-09-16 | End: 2017-09-18 | Stop reason: HOSPADM

## 2017-09-16 RX ORDER — OXYCODONE HYDROCHLORIDE 5 MG/1
5 TABLET ORAL EVERY 4 HOURS PRN
Status: DISCONTINUED | OUTPATIENT
Start: 2017-09-16 | End: 2017-09-18 | Stop reason: HOSPADM

## 2017-09-16 RX ORDER — PROMETHAZINE HYDROCHLORIDE 25 MG/1
12.5-25 SUPPOSITORY RECTAL EVERY 4 HOURS PRN
Status: DISCONTINUED | OUTPATIENT
Start: 2017-09-16 | End: 2017-09-17

## 2017-09-16 RX ORDER — LORAZEPAM 1 MG/1
3 TABLET ORAL
Status: DISCONTINUED | OUTPATIENT
Start: 2017-09-16 | End: 2017-09-18 | Stop reason: HOSPADM

## 2017-09-16 RX ORDER — PEG-3350, SODIUM SULFATE, SODIUM CHLORIDE, POTASSIUM CHLORIDE, SODIUM ASCORBATE AND ASCORBIC ACID 7.5-2.691G
100 KIT ORAL ONCE
Status: DISCONTINUED | OUTPATIENT
Start: 2017-09-17 | End: 2017-09-16

## 2017-09-16 RX ORDER — ONDANSETRON 4 MG/1
4 TABLET, ORALLY DISINTEGRATING ORAL EVERY 4 HOURS PRN
Status: DISCONTINUED | OUTPATIENT
Start: 2017-09-16 | End: 2017-09-18 | Stop reason: HOSPADM

## 2017-09-16 RX ORDER — DIPHENHYDRAMINE HYDROCHLORIDE 50 MG/ML
25 INJECTION INTRAMUSCULAR; INTRAVENOUS
Status: DISCONTINUED | OUTPATIENT
Start: 2017-09-17 | End: 2017-09-17

## 2017-09-16 RX ORDER — POTASSIUM CHLORIDE 20 MEQ/1
40 TABLET, EXTENDED RELEASE ORAL ONCE
Status: COMPLETED | OUTPATIENT
Start: 2017-09-16 | End: 2017-09-16

## 2017-09-16 RX ORDER — HYDROXYCHLOROQUINE SULFATE 200 MG/1
200 TABLET, FILM COATED ORAL 2 TIMES DAILY
Status: DISCONTINUED | OUTPATIENT
Start: 2017-09-16 | End: 2017-09-18 | Stop reason: HOSPADM

## 2017-09-16 RX ORDER — LORAZEPAM 2 MG/ML
0.5 INJECTION INTRAMUSCULAR EVERY 4 HOURS PRN
Status: DISCONTINUED | OUTPATIENT
Start: 2017-09-16 | End: 2017-09-18 | Stop reason: HOSPADM

## 2017-09-16 RX ORDER — MORPHINE SULFATE 4 MG/ML
2-4 INJECTION, SOLUTION INTRAMUSCULAR; INTRAVENOUS EVERY 4 HOURS PRN
Status: DISCONTINUED | OUTPATIENT
Start: 2017-09-16 | End: 2017-09-18 | Stop reason: HOSPADM

## 2017-09-16 RX ORDER — PROMETHAZINE HYDROCHLORIDE 25 MG/1
12.5-25 TABLET ORAL EVERY 4 HOURS PRN
Status: DISCONTINUED | OUTPATIENT
Start: 2017-09-16 | End: 2017-09-17

## 2017-09-16 RX ORDER — SODIUM CHLORIDE 9 MG/ML
INJECTION, SOLUTION INTRAVENOUS CONTINUOUS
Status: DISCONTINUED | OUTPATIENT
Start: 2017-09-16 | End: 2017-09-16

## 2017-09-16 RX ORDER — OMEPRAZOLE 20 MG/1
20 CAPSULE, DELAYED RELEASE ORAL DAILY
Status: DISCONTINUED | OUTPATIENT
Start: 2017-09-16 | End: 2017-09-16

## 2017-09-16 RX ORDER — LORAZEPAM 2 MG/ML
2 INJECTION INTRAMUSCULAR
Status: DISCONTINUED | OUTPATIENT
Start: 2017-09-16 | End: 2017-09-18 | Stop reason: HOSPADM

## 2017-09-16 RX ORDER — POLYETHYLENE GLYCOL 3350 17 G/17G
1 POWDER, FOR SOLUTION ORAL
Status: DISCONTINUED | OUTPATIENT
Start: 2017-09-15 | End: 2017-09-18 | Stop reason: HOSPADM

## 2017-09-16 RX ORDER — LORAZEPAM 1 MG/1
4 TABLET ORAL
Status: DISCONTINUED | OUTPATIENT
Start: 2017-09-16 | End: 2017-09-18 | Stop reason: HOSPADM

## 2017-09-16 RX ORDER — AMOXICILLIN 250 MG
2 CAPSULE ORAL 2 TIMES DAILY
Status: DISCONTINUED | OUTPATIENT
Start: 2017-09-16 | End: 2017-09-18 | Stop reason: HOSPADM

## 2017-09-16 RX ORDER — ONDANSETRON 2 MG/ML
4 INJECTION INTRAMUSCULAR; INTRAVENOUS EVERY 4 HOURS PRN
Status: DISCONTINUED | OUTPATIENT
Start: 2017-09-16 | End: 2017-09-18 | Stop reason: HOSPADM

## 2017-09-16 RX ORDER — LORAZEPAM 2 MG/ML
1.5 INJECTION INTRAMUSCULAR
Status: DISCONTINUED | OUTPATIENT
Start: 2017-09-16 | End: 2017-09-18 | Stop reason: HOSPADM

## 2017-09-16 RX ORDER — CALCIUM CARBONATE 500 MG/1
500 TABLET, CHEWABLE ORAL
Status: DISCONTINUED | OUTPATIENT
Start: 2017-09-16 | End: 2017-09-18 | Stop reason: HOSPADM

## 2017-09-16 RX ORDER — PEG-3350, SODIUM SULFATE, SODIUM CHLORIDE, POTASSIUM CHLORIDE, SODIUM ASCORBATE AND ASCORBIC ACID 7.5-2.691G
100 KIT ORAL ONCE
Status: DISCONTINUED | OUTPATIENT
Start: 2017-09-16 | End: 2017-09-16

## 2017-09-16 RX ORDER — LORAZEPAM 1 MG/1
2 TABLET ORAL
Status: DISCONTINUED | OUTPATIENT
Start: 2017-09-16 | End: 2017-09-18 | Stop reason: HOSPADM

## 2017-09-16 RX ADMIN — PANTOPRAZOLE SODIUM 40 MG: 40 INJECTION, POWDER, FOR SOLUTION INTRAVENOUS at 04:18

## 2017-09-16 RX ADMIN — POTASSIUM CHLORIDE AND SODIUM CHLORIDE: 900; 300 INJECTION, SOLUTION INTRAVENOUS at 17:57

## 2017-09-16 RX ADMIN — CALCIUM GLUCONATE 1 G: 94 INJECTION, SOLUTION INTRAVENOUS at 21:18

## 2017-09-16 RX ADMIN — PANTOPRAZOLE SODIUM 40 MG: 40 INJECTION, POWDER, FOR SOLUTION INTRAVENOUS at 21:23

## 2017-09-16 RX ADMIN — PREDNISONE 15 MG: 5 TABLET ORAL at 09:30

## 2017-09-16 RX ADMIN — SODIUM CHLORIDE 80 MG: 9 INJECTION, SOLUTION INTRAVENOUS at 13:12

## 2017-09-16 RX ADMIN — LISINOPRIL 20 MG: 20 TABLET ORAL at 09:30

## 2017-09-16 RX ADMIN — MAGNESIUM SULFATE HEPTAHYDRATE 4 G: 40 INJECTION, SOLUTION INTRAVENOUS at 06:03

## 2017-09-16 RX ADMIN — CEFTRIAXONE 2 G: 2 INJECTION, POWDER, FOR SOLUTION INTRAMUSCULAR; INTRAVENOUS at 22:32

## 2017-09-16 RX ADMIN — SODIUM CHLORIDE: 9 INJECTION, SOLUTION INTRAVENOUS at 03:10

## 2017-09-16 RX ADMIN — POTASSIUM CHLORIDE 10 MEQ: 7.46 INJECTION, SOLUTION INTRAVENOUS at 00:43

## 2017-09-16 RX ADMIN — POTASSIUM CHLORIDE 40 MEQ: 1500 TABLET, EXTENDED RELEASE ORAL at 03:34

## 2017-09-16 RX ADMIN — ONDANSETRON 4 MG: 2 INJECTION INTRAMUSCULAR; INTRAVENOUS at 01:12

## 2017-09-16 RX ADMIN — PANTOPRAZOLE SODIUM 40 MG: 40 INJECTION, POWDER, FOR SOLUTION INTRAVENOUS at 09:30

## 2017-09-16 RX ADMIN — HYDROXYCHLOROQUINE SULFATE 200 MG: 200 TABLET, FILM COATED ORAL at 09:30

## 2017-09-16 RX ADMIN — ANTACID TABLETS 500 MG: 500 TABLET, CHEWABLE ORAL at 21:23

## 2017-09-16 RX ADMIN — HYDROXYCHLOROQUINE SULFATE 200 MG: 200 TABLET, FILM COATED ORAL at 21:23

## 2017-09-16 RX ADMIN — OXYCODONE HYDROCHLORIDE 5 MG: 5 TABLET ORAL at 01:44

## 2017-09-16 RX ADMIN — SODIUM CHLORIDE: 9 INJECTION, SOLUTION INTRAVENOUS at 13:12

## 2017-09-16 RX ADMIN — THIAMINE HYDROCHLORIDE 100 MG: 100 INJECTION, SOLUTION INTRAMUSCULAR; INTRAVENOUS at 14:51

## 2017-09-16 RX ADMIN — POTASSIUM CHLORIDE 10 MEQ: 7.46 INJECTION, SOLUTION INTRAVENOUS at 03:09

## 2017-09-16 RX ADMIN — OXYCODONE HYDROCHLORIDE 5 MG: 5 TABLET ORAL at 17:57

## 2017-09-16 RX ADMIN — LORAZEPAM 1 MG: 2 INJECTION INTRAMUSCULAR; INTRAVENOUS at 03:27

## 2017-09-16 RX ADMIN — POTASSIUM CHLORIDE AND SODIUM CHLORIDE: 900; 300 INJECTION, SOLUTION INTRAVENOUS at 03:10

## 2017-09-16 ASSESSMENT — LIFESTYLE VARIABLES
TOTAL SCORE: 14
AUDITORY DISTURBANCES: NOT PRESENT
AGITATION: NORMAL ACTIVITY
AGITATION: NORMAL ACTIVITY
ANXIETY: *
NAUSEA AND VOMITING: MILD NAUSEA WITH NO VOMITING
AUDITORY DISTURBANCES: VERY MILD HARSHNESS OR ABILITY TO FRIGHTEN
AUDITORY DISTURBANCES: NOT PRESENT
AUDITORY DISTURBANCES: VERY MILD HARSHNESS OR ABILITY TO FRIGHTEN
TOTAL SCORE: 2
AVERAGE NUMBER OF DAYS PER WEEK YOU HAVE A DRINK CONTAINING ALCOHOL: 7
VISUAL DISTURBANCES: VERY MILD SENSITIVITY
SUBSTANCE_ABUSE: 1
HEADACHE, FULLNESS IN HEAD: VERY MILD
AGITATION: NORMAL ACTIVITY
PAROXYSMAL SWEATS: NO SWEAT VISIBLE
AGITATION: NORMAL ACTIVITY
NAUSEA AND VOMITING: NO NAUSEA AND NO VOMITING
ANXIETY: MILDLY ANXIOUS
VISUAL DISTURBANCES: NOT PRESENT
VISUAL DISTURBANCES: NOT PRESENT
HEADACHE, FULLNESS IN HEAD: MODERATELY SEVERE
HEADACHE, FULLNESS IN HEAD: VERY MILD
ALCOHOL_USE: YES
ORIENTATION AND CLOUDING OF SENSORIUM: ORIENTED AND CAN DO SERIAL ADDITIONS
ON A TYPICAL DAY WHEN YOU DRINK ALCOHOL HOW MANY DRINKS DO YOU HAVE: 16
ORIENTATION AND CLOUDING OF SENSORIUM: ORIENTED AND CAN DO SERIAL ADDITIONS
TOTAL SCORE: 3
AUDITORY DISTURBANCES: NOT PRESENT
TOTAL SCORE: 4
VISUAL DISTURBANCES: VERY MILD SENSITIVITY
NAUSEA AND VOMITING: INTERMITTENT NAUSEA WITH DRY HEAVES
TOTAL SCORE: 4
HEADACHE, FULLNESS IN HEAD: VERY MILD
AGITATION: NORMAL ACTIVITY
TREMOR: TREMOR NOT VISIBLE BUT CAN BE FELT, FINGERTIP TO FINGERTIP
ANXIETY: *
PAROXYSMAL SWEATS: NO SWEAT VISIBLE
ORIENTATION AND CLOUDING OF SENSORIUM: ORIENTED AND CAN DO SERIAL ADDITIONS
ANXIETY: MILDLY ANXIOUS
TREMOR: *
ORIENTATION AND CLOUDING OF SENSORIUM: ORIENTED AND CAN DO SERIAL ADDITIONS
AGITATION: NORMAL ACTIVITY
ORIENTATION AND CLOUDING OF SENSORIUM: ORIENTED AND CAN DO SERIAL ADDITIONS
AUDITORY DISTURBANCES: NOT PRESENT
CONSUMPTION TOTAL: INCOMPLETE
ANXIETY: *
PAROXYSMAL SWEATS: NO SWEAT VISIBLE
VISUAL DISTURBANCES: NOT PRESENT
TOTAL SCORE: 8
PAROXYSMAL SWEATS: NO SWEAT VISIBLE
NAUSEA AND VOMITING: NO NAUSEA AND NO VOMITING
VISUAL DISTURBANCES: NOT PRESENT
TREMOR: *
HEADACHE, FULLNESS IN HEAD: MILD
PAROXYSMAL SWEATS: NO SWEAT VISIBLE
HOW MANY TIMES IN THE PAST YEAR HAVE YOU HAD 5 OR MORE DRINKS IN A DAY: 300
NAUSEA AND VOMITING: NO NAUSEA AND NO VOMITING
PAROXYSMAL SWEATS: NO SWEAT VISIBLE
NAUSEA AND VOMITING: NO NAUSEA AND NO VOMITING
TREMOR: NO TREMOR
TREMOR: TREMOR NOT VISIBLE BUT CAN BE FELT, FINGERTIP TO FINGERTIP
HEADACHE, FULLNESS IN HEAD: NOT PRESENT
ANXIETY: MILDLY ANXIOUS
TREMOR: TREMOR NOT VISIBLE BUT CAN BE FELT, FINGERTIP TO FINGERTIP
ORIENTATION AND CLOUDING OF SENSORIUM: ORIENTED AND CAN DO SERIAL ADDITIONS

## 2017-09-16 ASSESSMENT — ENCOUNTER SYMPTOMS
ABDOMINAL PAIN: 1
DEPRESSION: 1
NECK PAIN: 0
NERVOUS/ANXIOUS: 1
DOUBLE VISION: 0
SORE THROAT: 0
HEADACHES: 0
FEVER: 0
MEMORY LOSS: 0
NAUSEA: 0
DIARRHEA: 0
TINGLING: 0
DIAPHORESIS: 0
VOMITING: 0
SEIZURES: 0
FALLS: 0
SENSORY CHANGE: 0
HEARTBURN: 0
BLOOD IN STOOL: 0
BACK PAIN: 0
MYALGIAS: 0
COUGH: 0
DIZZINESS: 0
TREMORS: 0
WHEEZING: 0
HALLUCINATIONS: 0
WEAKNESS: 1
PHOTOPHOBIA: 0
HEMOPTYSIS: 0
SPEECH CHANGE: 0
PND: 0
ABDOMINAL PAIN: 0
FOCAL WEAKNESS: 0
CHILLS: 0
FLANK PAIN: 0
MYALGIAS: 1
CONSTIPATION: 0
LOSS OF CONSCIOUSNESS: 0
INSOMNIA: 1
NAUSEA: 1
SHORTNESS OF BREATH: 0
BLURRED VISION: 0
PALPITATIONS: 0

## 2017-09-16 ASSESSMENT — PAIN SCALES - GENERAL
PAINLEVEL_OUTOF10: 0
PAINLEVEL_OUTOF10: 8
PAINLEVEL_OUTOF10: 0
PAINLEVEL_OUTOF10: 7
PAINLEVEL_OUTOF10: 0
PAINLEVEL_OUTOF10: 0

## 2017-09-16 NOTE — PROGRESS NOTES
Francesca from Lab called with critical result of Calcium 6.3 at 1439. Critical lab result read back to Francesca.   Dr. Radford notified of critical lab result at 1500.  Critical lab result read back by Dr. Radford.

## 2017-09-16 NOTE — CARE PLAN
Problem: Communication  Goal: The ability to communicate needs accurately and effectively will improve  Outcome: PROGRESSING AS EXPECTED  RN educated pt on POC, encouraged pt to ask questions and answered all questions.     Problem: Safety  Goal: Will remain free from falls  Outcome: PROGRESSING AS EXPECTED  RN educated pt on fall risks and fall prevention, call light and personal belongings in reach, bed alarm and treaded slipper socks in place, hourly rounding in place.

## 2017-09-16 NOTE — ASSESSMENT & PLAN NOTE
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

## 2017-09-16 NOTE — ED NOTES
from Lab called with critical result of potassium of 2.5at 2229. Critical lab result read back to .   Dr. Benitez notified of critical lab result at 2230.  Critical lab result read back by Dr. Benitez.

## 2017-09-16 NOTE — PROGRESS NOTES
Pt has arrived to the floor, escorted by ER nurse, pt has been placed on the tele box, monitor roomed notified, pt has been oriented to the room, call light in reach, bed alarm in place.

## 2017-09-16 NOTE — CARE PLAN
Problem: Safety  Goal: Will remain free from injury  Outcome: PROGRESSING AS EXPECTED  Patient educated to use call light for assistance. Fall precautions in place. Staff will assist with mobilization. Hourly rounding in place.     Problem: Infection  Goal: Will remain free from infection  Outcome: PROGRESSING AS EXPECTED  Proper hand hygiene before and after patient care to ensure patient will remain free from infection.

## 2017-09-16 NOTE — ASSESSMENT & PLAN NOTE
Patient's  just  yesterday, she denies any suicidal ideation but is appropriately depressed. We will continue to monitor.

## 2017-09-16 NOTE — ED PROVIDER NOTES
"ED Provider Note    Scribed for Duncan Benitez M.D. by Ольга Saravia. 9/15/2017  9:45 PM    Primary care provider: Jasmyne Soto M.D.  Means of arrival: ambulance  History obtained from: patient  History limited by: none    CHIEF COMPLAINT  Chief Complaint   Patient presents with   • N/V     for today, unable to keep anything down.    • Difficulty Urinating     For 3 days, pt reports only small trinkles.        HPI  Brandi Leggett is a 51 y.o. female who presents with nausea and vomiting(6x a day) onset 1 week ago with associated torso numbness, limited diarrhea. The vomiting is described as bile, and the diarrhea is described as black. Patient has a history of GI bleeding and she recently had an ablation completed in August by Dr. Boone. She also states that she has a history of pancreatitis.    Patient's  passed away yesterday, and she drank alcohol yesterday. She is not supposed to be drinking secondary to kidney problems and her pancreatitis.   Patient is also complaining of bilateral flank pain that has been there for several weeks with associated dysuria, discolored urine, frequency and urgency. She was discharged 1 month ago on antibiotics after being treated for a kidney infection. Patient states that her symptoms never fully resolved.   Patient has also been experiencing chest pain described as \"something sitting on my chest\" since yesterday with associated dizziness.  No complaints of depression or ideas of self harm, cough, fever, extremity pain/swelling.    REVIEW OF SYSTEMS  Pertinent positives include: nausea, vomiting, torso numbness, diarrhea, bilateral flank pain, dysuria, discolored urine, frequency, urgency, chest pain, dizziness.  Pertinent negatives include: depression or ideas of self harm, cough, fever, extremity pain/swelling.  10+ systems reviewed and negative.      PAST MEDICAL HISTORY  Past Medical History:   Diagnosis Date   • Arthritis    • Fibroids    • Hypertension    • Lupus " (CMS-HCC)    • Pancreatitis    • Psoriatic arthritis (CMS-HCC)    • SLE (systemic lupus erythematosus) [place]        FAMILY HISTORY  Family History   Problem Relation Age of Onset   • Diabetes Mother    • Hypertension Mother    • Cancer Mother    • Diabetes Father    • Cancer Father        SOCIAL HISTORY  Social History   Substance Use Topics   • Smoking status: Never Smoker   • Smokeless tobacco: Never Used   • Alcohol use Yes     History   Drug Use No       SURGICAL HISTORY  Past Surgical History:   Procedure Laterality Date   • COLONOSCOPY  8/8/2017    Procedure: COLONOSCOPY;  Surgeon: Abdon Boone M.D.;  Location: SURGERY [facility];  Service:    • COLONOSCOPY N/A 5/31/2017    Procedure: COLONOSCOPY;  Surgeon: Enrique Canseco M.D.;  Location: SURGERY [facility];  Service:    • GASTROSCOPY WITH BIOPSY N/A 5/31/2017    Procedure: GASTROSCOPY WITH BIOPSY;  Surgeon: Enrique Canseco M.D.;  Location: SURGERY [facility];  Service:    • GYN SURGERY  8/2013    Hysteroscopy, D and C       CURRENT MEDICATIONS    Current Outpatient Prescriptions   Medication Sig Dispense Refill   • atorvastatin (LIPITOR) 80 MG tablet Take 1 Tab by mouth every bedtime. 30 Tab 1   • folic acid (FOLVITE) 1 MG Tab Take 1 mg by mouth every day.     • predniSONE (DELTASONE) 5 MG Tab Take 15 mg by mouth every day.     • Adalimumab 40 MG/0.8ML Pen-injector Kit Inject 0.8 mL as instructed every 14 days. 2 Each 2   • hydroxychloroquine (PLAQUENIL) 200 MG Tab Take 1 Tab by mouth 2 times a day. 60 Tab 2   • omeprazole (PRILOSEC) 20 MG delayed-release capsule Take 1 Cap by mouth every day. 30 Cap 3   • lisinopril (PRINIVIL) 20 MG Tab Take 20 mg by mouth every day.     • allopurinol (ZYLOPRIM) 300 MG Tab Take 1 Tab by mouth every day. 30 Tab 0   • ergocalciferol (DRISDOL) 19399 UNIT capsule Take 1 cap once weekly for 12 weeks. 12 Cap 0   • oxybutynin (DITROPAN) 5 MG TABS Take 5 mg by mouth 2 Times a Day.    "      ALLERGIES  Allergies   Allergen Reactions   • Codeine        PHYSICAL EXAM  VITAL SIGNS: BP (!) 91/53   Pulse (!) 106   Temp 36.6 °C (97.9 °F)   Resp 14   Ht 1.6 m (5' 3\")   Wt 81.6 kg (180 lb)   LMP 08/04/2015   BMI 31.89 kg/m²   Reviewed and shows Borderline hypotension and tachycardia    Constitutional: Well developed, Well nourished,uncomfortable appearing.  HENT: Normocephalic, atraumatic, bilateral external ears normal, oropharynx moist, No exudates or erythema.   Eyes: PERRLA, conjunctiva pink, mild scleral icterus.   Cardiovascular: Regular rate and rhythm. No murmurs, rubs or gallops.   Respiratory: Lungs clear to auscultation bilaterally. No wheezes, rales, or rhonchi.   Gastrointestinal:  Abdomen soft, epigastric and RUQ tenderness, non distended. Rectal: guaiac negative  Back: bilateral CVA tenderness R>L  Skin: No erythema, no rash.   Neurologic: Alert & oriented x 3, cranial nerves 2-12 intact by passive exam.  No focal deficit noted.  Psychiatric: Affect normal, Judgment normal, Mood normal.     DIFFERENTIAL DIAGNOSIS:  Pancreatitis, cirrhosis, acute depression, UTI, AVM bleeding, gastritis, peptic ulcer disease.    EKG  EKG Interpretation:  Interpreted by me    Rhythm:  Sinus tachycardia  Rate: 117  MA prolongation  Q Waves: inferior waves  Clinical Impression: sinus tachycardia with 1st degree AV block and possible old inferior ischemia    RADIOLOGY/PROCEDURES  DX-CHEST-LIMITED (1 VIEW)   Final Result      No acute cardiopulmonary disease.          LABORATORY:  Results for orders placed or performed during the hospital encounter of 09/15/17   CBC WITH DIFFERENTIAL   Result Value Ref Range    WBC 10.2 4.8 - 10.8 K/uL    RBC 2.11 (L) 4.20 - 5.40 M/uL    Hemoglobin 6.4 (L) 12.0 - 16.0 g/dL    Hematocrit 19.1 (L) 37.0 - 47.0 %    MCV 90.5 81.4 - 97.8 fL    MCH 30.3 27.0 - 33.0 pg    MCHC 33.5 (L) 33.6 - 35.0 g/dL    RDW 53.3 (H) 35.9 - 50.0 fL    Platelet Count 237 164 - 446 K/uL    MPV " 10.7 9.0 - 12.9 fL    Neutrophils-Polys 83.10 (H) 44.00 - 72.00 %    Lymphocytes 6.10 (L) 22.00 - 41.00 %    Monocytes 9.60 0.00 - 13.40 %    Eosinophils 0.10 0.00 - 6.90 %    Basophils 0.50 0.00 - 1.80 %    Immature Granulocytes 0.60 0.00 - 0.90 %    Nucleated RBC 0.00 /100 WBC    Neutrophils (Absolute) 8.45 (H) 2.00 - 7.15 K/uL    Lymphs (Absolute) 0.62 (L) 1.00 - 4.80 K/uL    Monos (Absolute) 0.98 (H) 0.00 - 0.85 K/uL    Eos (Absolute) 0.01 0.00 - 0.51 K/uL    Baso (Absolute) 0.05 0.00 - 0.12 K/uL    Immature Granulocytes (abs) 0.06 0.00 - 0.11 K/uL    NRBC (Absolute) 0.00 K/uL   COMP METABOLIC PANEL   Result Value Ref Range    Sodium 131 (L) 135 - 145 mmol/L    Potassium 2.5 (LL) 3.6 - 5.5 mmol/L    Chloride 90 (L) 96 - 112 mmol/L    Co2 18 (L) 20 - 33 mmol/L    Anion Gap 23.0 (H) 0.0 - 11.9    Glucose 117 (H) 65 - 99 mg/dL    Bun 14 8 - 22 mg/dL    Creatinine 1.53 (H) 0.50 - 1.40 mg/dL    Calcium 7.0 (L) 8.5 - 10.5 mg/dL    AST(SGOT) 67 (H) 12 - 45 U/L    ALT(SGPT) 15 2 - 50 U/L    Alkaline Phosphatase 206 (H) 30 - 99 U/L    Total Bilirubin 2.7 (H) 0.1 - 1.5 mg/dL    Albumin 3.4 3.2 - 4.9 g/dL    Total Protein 6.0 6.0 - 8.2 g/dL    Globulin 2.6 1.9 - 3.5 g/dL    A-G Ratio 1.3 g/dL   LIPASE   Result Value Ref Range    Lipase 36 11 - 82 U/L   PROTHROMBIN TIME   Result Value Ref Range    PT 14.8 (H) 12.0 - 14.6 sec    INR 1.12 0.87 - 1.13   APTT   Result Value Ref Range    APTT 28.9 24.7 - 36.0 sec   TROPONIN   Result Value Ref Range    Troponin I <0.01 0.00 - 0.04 ng/mL         INTERVENTIONS:  Medications   ondansetron (ZOFRAN) syringe/vial injection 4 mg    potassium chloride in water (KCL) ivpb 10 mEq    NS infusion 1,000 mL (1,000 mL Intravenous New Bag 9/15/17 1077)   Transfusion 2 units packed red blood cells ordered and pending  Protonix 40 mg IV    Response:improved.    COURSE & MEDICAL DECISION MAKING    Review of patient's past medical records shows a lower GI bleed, 2 unit transfusion and AVM ablated in  august, . Patient had renal insufficiency at that time which resolved. Pancreatitis is not one of her known medical problems    9:45 PM - Patient seen and examined at bedside. Patient will be treated with 4mg Zofran for her symptoms and given IV NS for possible dehydration secondary to vomiting. Ordered chest xray, COD, UA, troponin, prothrombin time, APTT, urine pregnancy, CBC, CMP, lipase, UA, estimated GFR, troponin, and EKG to evaluate. I informed the patient that I would evaluate with imaging and labs for any acute origin for her symptoms while also evaluating for any UTI or kidney infection, which she is prone to.    10:52 PM Spoke with Dr. Beltrán, about the patient's condition. Agrees to admit. Case discussed with Dr. Harleen PATEL who will see the patient in consultation in the morning. He recommended Protonix IV 40 mg twice a day.    This patient presents with abdominal pain, vomiting, black diarrhea, history of GI bleed, chest pain, recent binge alcohol use and very recent death of her . Patient has no blood in her stool at this moment however hemoglobin has dropped 2 g to 6.4 from 8.8 requiring transfusion. His likely the patient has had intermittent GI bleed. She also has alcoholic liver disease and hypokalemia likely secondary to vomiting. She denies depression or suicidal ideation. At this point there is no evidence of myocardial ischemia nor pneumonia. Patient had transient hypotension in the ED treated with IV fluids and transfusion.    DISPOSITION:  Patient will be admitted to the hospitalist for transfusion, potassium replacement, rehydration, endoscopy    CONDITION: fair.    FINAL IMPRESSION  1. Gastrointestinal hemorrhage, unspecified gastrointestinal hemorrhage type    2. Iron deficiency anemia due to chronic blood loss    3. Hypokalemia    4. Alcoholic liver disease (CMS-HCC)        Electronically signed by: Ольга Saravia, 9/15/2017 9:45 PM    The note accurately reflects work and  decisions made by me.  Duncan Benitez  9/16/2017  1:09 AM
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
## 2017-09-16 NOTE — CONSULTS
[facility]    DATE OF SERVICE:  09/16/2017    GASTROENTEROLOGY CONSULTATION    TIME:  09:53 a.m.    REFERRING PHYSICIAN:  Aurelia Beltrán MD    PRIMARY CARE PHYSICIAN:  Jasmyne Soto MD    GASTROENTEROLOGIST:  Enrique Canseco MD    REASON FOR CONSULTATION:  Melena.    HISTORY OF PRESENT ILLNESS:  A 51-year-old  female alcoholic,   presents with melena, nausea, vomiting for approximately 1 week.  Patient has   had prior hospitalizations for lower GI bleeding in May and August 2017.  She   was found to have colonic angiodysplasias, which were ablated in the cecum and   sigmoid colon.  In May 2017, she had an EGD, which revealed erosive   esophagitis.  At that time, she had gastric biopsies that were H. pylori   negative.  Duodenal biopsy showed increased lymphocytes with plasma cells and   eosinophils with some equivocal villous blunting, a definitive celiac sprue   was not diagnosed.  Of note, her tTG IgA antibody was negative in August 2017   and her total IgA level was not deficient.  Otherwise, she has been drinking   excessive alcohol recently up to a pint a day because her  recently   unexpectedly passed away.  She has had associated nausea and vomiting with   melena, but denied any hemetemesis.  She does have chronic lower abdominal   pain issues.  She denies bright red blood and maroon hematochezia and rectal   bleeding with careful history and repeated questioning.  She also noticed some fatigue   and malaise with nonexertional chest pain and some possible odynophagia, but   denied any dysphagia.  She has had some myalgias and weakness and has been   depressed as well.  She, otherwise, denies further modifying factors,   associated symptoms, or timing issues with her complaints.    PAST MEDICAL HISTORY:  1.  History of colonic angiodysplasias treated in September and August 2017.  2.  History of erosive esophagitis.  3.  History of duodenal increased lymphocytes with villous blunting in    September 2017.  4.  Alcohol abuse.  5.  Systemic lupus erythematosus.  6.  Psoriatic arthritis.  7.  History of alcohol-induced pancreatitis.  8.  Hypertension.  9.  Uterine fibroids.  10.  Postmenopausal status.  11.  Obesity, BMI of 31.    PAST SURGICAL HISTORY:  Hysteroscopy and D and C 2013.    SOCIAL HISTORY:  Retired .  Denied tobacco, illicit drug use and tattoos.    Admits to drinking alcohol, up to a pint a day, mostly binge drinks, cannot   quantify duration.    FAMILY HISTORY:  Denied family history of gastric cancer, colorectal cancer,   or colon polyps.    ALLERGIES:  CODEINE.    OUTPATIENT MEDICATIONS:  Folic acid, prednisone, Plaquenil, omeprazole 20 mg   daily, lisinopril, albuterol, vitamin D, ergocalciferol.    REVIEW OF SYSTEMS:  As per HPI, past medical history, past surgical history   sections, otherwise greater than 10 systems negative including constitutional,   head, ears, eyes, nose, throat, pulmonary, cardiovascular, GI, genital,   rectal, hematological, rheumatological, psychiatric, neurological,   dermatological, musculoskeletal female.    PHYSICAL EXAMINATION:  VITAL SIGNS:  Temperature 98.5, respirations 17, pulse 103, blood pressure   105/61, weight 77.3 kilograms.  GENERAL:  Well-developed, well-nourished, obese white female in no apparent   distress, alert and oriented.  HEENT:  Head atraumatic, normocephalic.  Eyes, extraocular movements intact,   icteric sclerae.  Mouth, no erythema, no discharge, no thrush noted.    Mallampati 3.  PULMONARY:  Clear to auscultation bilaterally.  CARDIOVASCULAR:  Regular rate and rhythm with a systolic murmur.  No rub or   gallop.  ABDOMEN:  Nondistended, nontender.  Positive bowel sounds.  No appreciable   splenomegaly, but the liver palpated.  EXTREMITIES:  No clubbing, cyanosis, 1+ pitting edema, intact pulses.  NEUROLOGICAL:  No focal deficits appreciated.  No asterixis.  MUSCULOSKELETAL:  Moving all extremities.  Strength 5/5  throughout.  PSYCHIATRIC:  Appropriate mood, affect, interaction, and insight except for   being depressed.    LABORATORY DATA:  WBC 5.7, hemoglobin 7.3, hematocrit 21.8, platelet count   168.  Sodium 132, potassium 2.9, chloride 94, bicarbonate 20, BUN 14,   creatinine 1.51.  GFR 37, calcium 6.4, AST 64, ALT 15, alkaline phosphatase   206, total bilirubin 2.7, albumin 3.2, magnesium 0.7, lipase 36.  Troponin   less than 0.01.  INR 1.12.  TSH 2.65.  Stool C. diff negative from 8/5/2017.    Blood type is O positive.    IMAGING:  Portable chest x-ray 09/15/2017, no acute cardiopulmonary   abnormality. HIDA scan from 06/02/2017, no evidence of acute cholecystitis nor   common bile duct obstruction, persistent hepatic parenchymal activity   consistent with hepatocellular dysfunction, normal gallbladder EF greater than   38%.  Abdominal ultrasound 05/27/2017, distended gallbladder.  No evidence of   gallstones or ductal dilatation, echogenic fatty liver.  EKG, 09/16/2017,   sinus tachy with old inferior infarct, prolonged QT interval, QTC of 526.    IMPRESSION:  A 51-year-old  female with alcohol abuse and history of   erosive esophagitis, presents with nausea, vomiting and melena for 1 week with   anemia from chronic blood loss.    With patient's history of melena and denies any hematochezia, I suspect an   upper gastrointestinal source rather than in the lower.  In addition, she has   had colonic angiodysplasias treated in both May and August 2017 and since she   had a recent colonoscopy like this last month, we will not pursue this, but   instead we will just pursue EGD to evaluate patient's etiology of melena and   anemia from chronic blood loss.  In addition, due to her nausea and vomiting,   she is unlikely to be able to tolerate a bowel preparation and thus we will   wait for this to be better, wait for this to improve and also correct her   electrolytes and monitor renal function before potentially  pursuing   colonoscopy if EGD is negative.    PROBLEMS:  1.  Melena.  2.  Nausea and vomiting.  3.  Anemia from chronic blood loss.  4.  Gastroesophageal reflux disease with history of erosive esophagitis.  5.  Alcohol abuse.  6.  Hypokalemia and hypomagnesemia.  7.  Multiple comorbidities as per above.    RECOMMENDATIONS:  1.  IV Protonix b.i.d.  2.  Add Carafate.  3.  Alcohol cessation, warned of health hazards.  4.  Monitor for signs and symptoms of delirium tremors and alcohol withdrawal.  5.  Avoid NSAIDs.  6.  N.p.o. after midnight.  7.  EGD with possible esophageal varices banding, hemostasis, biopsies and/or   other endotherapy.  Under moderate sedation, plan for tomorrow morning at 9:00   a.m.  Risks, benefits, and alternatives were discussed with patient.  She was   given opportunity to ask questions and discussed other options, risk   including but not limited to perforation, infection, bleeding, missed lesions,   possible need for surgery, cardiopulmonary event, aspiration, stroke,   hospitalization, possibly prolonged discomfort, possibly repeat procedures,   additional testing, and other potential life-threatening complications.    Discussion was undertaken in layman's terms.  Patient stated clear   understanding and acceptance of risks and wished to proceed with procedure as   detailed in this note.  Consent was given by patient in clear state of mind.     Dear Dr. Aurelia Beltrán and Dr. Vika Hogue,    Thank you for asking me to evaluate the patient in consultation.  Please refer   to my consult note as per above for details of recommendations.  Please feel   free to call us with any questions.       ____________________________________     MD SARAH ELIZABETH / [facility]    DD:  09/16/2017 10:07:02  DT:  09/16/2017 11:13:10    D#:  [id number]  Job#:  [id number]    cc: VIKA HOGUE MD, AURELIA BELTRÁN MD, LYDIA PULIDO MD, Jasmyne Soto MD

## 2017-09-16 NOTE — ASSESSMENT & PLAN NOTE
This is mild, patient has no mental status changes. IV fluids as noted above and monitor chemistries.

## 2017-09-16 NOTE — ED NOTES
BIB EMS    Chief Complaint   Patient presents with   • N/V     for today, unable to keep anything down.    • Difficulty Urinating     For 3 days, pt reports only small trinkles.        Pt hx of  ETOH, pt reports stopped drinking yesterday. Pt  passed away yesterday.     Pt in gown, on monitor, chart up for ERP.

## 2017-09-16 NOTE — PROGRESS NOTES
Assumed care of patient. Assessment complete. Patient denies pain at this time. Educated to use call light for assistance. Fall precautions in place. Tele box in place. Will continue to monitor with hourly rounding.

## 2017-09-16 NOTE — PROGRESS NOTES
Luis from Lab called with critical result of Ca at 6.4 and Mg at 0.7. Critical lab result read back to [facility].   Pamella Alvarenga was paged twice to be notified of the result.

## 2017-09-16 NOTE — H&P
" Hospital Medicine History and Physical    Date of Service  9/15/2017    Chief Complaint  Abdominal pain, nausea, vomiting, and dark stools.    History of Presenting Illness  51 y.o. female who presented on 9/15/2017 with Abdominal pain, nausea, vomiting, and dark stools. The patient was recently admitted and discharged from our hospital status post lower GI bleed. She had undergone a colonoscopy at that time and was diagnosed with AVMs now status post ablation. For the last week, she has had some nausea and vomiting. The patient does carry a history of alcohol abuse and has been trying to abstain but she states that she began to drink alcohol yesterday because her  had unexpectedly passed away.  Patient has been feeling weak, shortness of breath, aches and pains all over,\" sick\". She reports that she has had multiple dark stools. Upon assessment in the ER, the patient has had a significant drop in her hemoglobin level although the stool collected is currently Hemoccult negative and brown.      Primary Care Physician  Jasmyne Soto M.D.    Consultants  Gastroenterology    Code Status  Full    Review of Systems  Review of Systems   Constitutional: Positive for malaise/fatigue. Negative for chills and fever.   HENT: Negative for congestion and sore throat.    Eyes: Negative for photophobia.   Respiratory: Negative for cough, shortness of breath and wheezing.    Cardiovascular: Positive for chest pain. Negative for palpitations.   Gastrointestinal: Negative for abdominal pain, diarrhea, nausea and vomiting.   Genitourinary: Negative for dysuria.   Musculoskeletal: Positive for myalgias.   Skin: Negative.    Neurological: Positive for weakness. Negative for dizziness, tingling, focal weakness and headaches.   Psychiatric/Behavioral: Positive for depression. Negative for suicidal ideas.        Past Medical History  Past Medical History:   Diagnosis Date   • Arthritis    • Fibroids    • Hypertension    • Lupus " (CMS-HCC)    • Pancreatitis    • Psoriatic arthritis (CMS-HCC)    • SLE (systemic lupus erythematosus) (CMS-HCC)        Surgical History  Past Surgical History:   Procedure Laterality Date   • COLONOSCOPY  2017    Procedure: COLONOSCOPY;  Surgeon: Abdon Boone M.D.;  Location: SURGERY [facility];  Service:    • COLONOSCOPY N/A 2017    Procedure: COLONOSCOPY;  Surgeon: Enrique Canseco M.D.;  Location: SURGERY [facility];  Service:    • GASTROSCOPY WITH BIOPSY N/A 2017    Procedure: GASTROSCOPY WITH BIOPSY;  Surgeon: Enrique Canseco M.D.;  Location: SURGERY [facility];  Service:    • GYN SURGERY  2013    Hysteroscopy, D and C       Medications  No current facility-administered medications on file prior to encounter.      Current Outpatient Prescriptions on File Prior to Encounter   Medication Sig Dispense Refill   • folic acid (FOLVITE) 1 MG Tab Take 1 mg by mouth every day.     • predniSONE (DELTASONE) 5 MG Tab Take 15 mg by mouth every day.     • hydroxychloroquine (PLAQUENIL) 200 MG Tab Take 1 Tab by mouth 2 times a day. 60 Tab 2   • omeprazole (PRILOSEC) 20 MG delayed-release capsule Take 1 Cap by mouth every day. 30 Cap 3   • lisinopril (PRINIVIL) 20 MG Tab Take 20 mg by mouth every day.     • allopurinol (ZYLOPRIM) 300 MG Tab Take 1 Tab by mouth every day. 30 Tab 0   • ergocalciferol (DRISDOL) 17845 UNIT capsule Take 1 cap once weekly for 12 weeks. 12 Cap 0       Family History  Family History   Problem Relation Age of Onset   • Diabetes Mother    • Hypertension Mother    • Cancer Mother    • Diabetes Father    • Cancer Father        Social History  Social History   Substance Use Topics   • Smoking status: Never Smoker   • Smokeless tobacco: Never Used   • Alcohol use Yes       Allergies  Allergies   Allergen Reactions   • Codeine         Physical Exam  Laboratory   Hemodynamics  Temp (24hrs), Av.7 °C (98.1 °F), Min:36.6 °C (97.9 °F), Max:36.8 °C (98.2 °F)    Temperature: 36.7 °C (98.1 °F)  Pulse  Av.8  Min: 99  Max: 106 Heart Rate (Monitored): (!) 106  Blood Pressure: 111/64, NIBP: (!) 96/53      Respiratory      Respiration: 18, Pulse Oximetry: 99 %        RUL Breath Sounds: Diminished, RML Breath Sounds: Diminished, RLL Breath Sounds: Diminished, ARNOL Breath Sounds: Diminished, LLL Breath Sounds: Diminished    Physical Exam   Constitutional: She is oriented to person, place, and time. No distress.   HENT:   Head: Normocephalic and atraumatic.   Right Ear: External ear normal.   Left Ear: External ear normal.   Eyes: EOM are normal. Right eye exhibits no discharge. Left eye exhibits no discharge.   Neck: Neck supple. No JVD present.   Cardiovascular: Normal rate, regular rhythm and normal heart sounds.    Pulmonary/Chest: Effort normal and breath sounds normal. No respiratory distress. She exhibits no tenderness.   Abdominal: Soft. Bowel sounds are normal. She exhibits no distension. There is no tenderness.   Musculoskeletal: Normal range of motion. She exhibits no edema.   Neurological: She is alert and oriented to person, place, and time. No cranial nerve deficit.   Skin: Skin is warm and dry. She is not diaphoretic. No erythema.   Psychiatric: She has a normal mood and affect. Her behavior is normal.   Nursing note and vitals reviewed.      Recent Labs      09/15/17   2136   WBC  10.2   RBC  2.11*   HEMOGLOBIN  6.4*   HEMATOCRIT  19.1*   MCV  90.5   MCH  30.3   MCHC  33.5*   RDW  53.3*   PLATELETCT  237   MPV  10.7     Recent Labs      09/15/17   2136   SODIUM  131*   POTASSIUM  2.5*   CHLORIDE  90*   CO2  18*   GLUCOSE  117*   BUN  14   CREATININE  1.53*   CALCIUM  7.0*     Recent Labs      09/15/17   2136   ALTSGPT  15   ASTSGOT  67*   ALKPHOSPHAT  206*   TBILIRUBIN  2.7*   LIPASE  36   GLUCOSE  117*     Recent Labs      09/15/17   2136   APTT  28.9   INR  1.12             Lab Results   Component Value Date    TROPONINI <0.01 09/15/2017        Imaging  Dx-chest-limited (1 View)    Result Date: 9/15/2017  9/15/2017 10:20 PM HISTORY/REASON FOR EXAM:  Chest Pain Nausea and vomiting TECHNIQUE/EXAM DESCRIPTION AND NUMBER OF VIEWS: Single portable view of the chest. COMPARISON: 2017 FINDINGS: Cardiomediastinal contour is within normal limits. No focal pulmonary consolidation. No pleural fluid collection or pneumothorax. No major bony abnormality is seen.     No acute cardiopulmonary disease.     Assessment/Plan     Anticipate that patient will needGreater than 2 midnights for management of the discussed medical issues.    This dictation was created using voice recognition software. The accuracy of the dictation is limited to the abilities of the software. I expect there may be some errors of grammar and possibly content.    * Anemia requiring transfusions- (present on admission)   Assessment & Plan    The patient was recently admitted to our hospital for GI bleed and underwent colonoscopy which showed AVM now status post ablation. The patient also required a transfusion during her last hospital admission. Her current Hemoccult is negative. However, she does have significant anemia requiring transfusion. She'll be monitored closely on telemetry and placed on IV fluid resuscitation for volume expansion. I will continue to trend her hemoglobin levels and start her on IV proton pump inhibitor. Gastroneurology has been consulted by the ER physician and I look forward to the recommendations.        Metabolic acidosis- (present on admission)   Assessment & Plan    Likely secondary to anemia requiring transfusion with decreased tissue perfusion. Expect improvement and closure of her anion gap with IV fluids. Monitor chemistries in the morning.        Other social stressor- (present on admission)   Assessment & Plan    Patient's  just  yesterday, she denies any suicidal ideation but is appropriately depressed. We will continue to monitor.         Hypokalemia- (present on admission)   Assessment & Plan    Patient will be receiving both IV and oral replenishment. I will check a magnesium level and correct if needed. She will be monitored closely on telemetry, currently she has no chest pain nor any T-wave changes.        Hyponatremia- (present on admission)   Assessment & Plan    This is mild, patient has no mental status changes. IV fluids as noted above and monitor chemistries.        RENEE (acute kidney injury) (CMS-HCC)- (present on admission)   Assessment & Plan    Patient had a recent ultrasound in June which showed no abnormalities, suspect prerenal azotemia and will correct with IV fluids. Repeat renal function in the morning.        HTN (hypertension)- (present on admission)   Assessment & Plan    This is chronic and stable, continue home Prinivil        SLE (systemic lupus erythematosus) (CMS-HCC)- (present on admission)   Assessment & Plan    This is chronic and stable, continue home prednisone and Plaquenil          Prophylaxis: Sequential compression devices for DVT prophylaxis, no PPI indicated, stool softeners ordered
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
## 2017-09-16 NOTE — PROGRESS NOTES
[facility] Hospitalist Progress Note    Date of Service: 2017    Chief Complaint  51 y.o. female admitted 9/15/2017 with Urinary retention, dark stook, abdom pain, N/V.     Interval Problem Update  Patient seen and evaluated on rounds. Liquor 1 pint before coming in. GIB. Recheck labs  ETOH hepatitis. CIWA. RENEE. IVF. EGD tomorrow. Bladder scan 308. No good uop    Obtain US liver and renal  Optimize nutrition  MOnitor renal function  Ceftriaxone for UTI      Consultants/Specialty  Gastroenterolgoy    Disposition  Ongoing acute issues        Review of Systems   Constitutional: Positive for malaise/fatigue. Negative for chills, diaphoresis and fever.   HENT: Negative for hearing loss.    Eyes: Negative for blurred vision and double vision.   Respiratory: Negative for cough, hemoptysis and shortness of breath.    Cardiovascular: Negative for chest pain, palpitations, leg swelling and PND.   Gastrointestinal: Positive for abdominal pain (chronic per patient), melena and nausea. Negative for blood in stool, constipation, diarrhea, heartburn and vomiting.   Genitourinary: Negative for dysuria, flank pain, frequency, hematuria and urgency.   Musculoskeletal: Negative for back pain, falls, joint pain, myalgias and neck pain.   Skin: Negative for itching and rash.   Neurological: Positive for weakness. Negative for dizziness, tingling, tremors, sensory change, speech change, focal weakness, seizures, loss of consciousness and headaches.   Psychiatric/Behavioral: Positive for depression and substance abuse. Negative for hallucinations, memory loss and suicidal ideas. The patient is nervous/anxious and has insomnia.       Physical Exam  Laboratory/Imaging   Hemodynamics  Temp (24hrs), Av.7 °C (98.1 °F), Min:36.5 °C (97.7 °F), Max:37.2 °C (98.9 °F)   Temperature: 36.9 °C (98.5 °F)  Pulse  Av.8  Min: 78  Max: 126 Heart Rate (Monitored): (!) 117  Blood Pressure: 105/61, NIBP: 112/74      Respiratory      Respiration:  17, Pulse Oximetry: 91 %        RUL Breath Sounds: Clear, RML Breath Sounds: Diminished, RLL Breath Sounds: Diminished, ARNOL Breath Sounds: Diminished, LLL Breath Sounds: Diminished    Fluids    Intake/Output Summary (Last 24 hours) at 09/16/17 1207  Last data filed at 09/16/17 0800   Gross per 24 hour   Intake              380 ml   Output              100 ml   Net              280 ml       Nutrition  Orders Placed This Encounter   Procedures   • DIET ORDER     Standing Status:   Standing     Number of Occurrences:   1     Order Specific Question:   Diet:     Answer:   Clear Liquids - No Red Foods [12]   • DIET NPO     Standing Status:   Standing     Number of Occurrences:   8     Order Specific Question:   Restrict to:     Answer:   Sips with Medications [3]     Comments:   strict NPO after 6am tomorrow     Physical Exam   Constitutional: She is oriented to person, place, and time. She appears well-developed and well-nourished. No distress.   HENT:   Head: Normocephalic.   Mouth/Throat: Oropharynx is clear and moist. No oropharyngeal exudate.   Eyes: Pupils are equal, round, and reactive to light. Scleral icterus is present.   Pale conjunctivae   Neck: No JVD present.   Cardiovascular: Normal rate, regular rhythm and normal heart sounds.  Exam reveals no gallop and no friction rub.    No murmur heard.  Pulmonary/Chest: Breath sounds normal. No stridor. No respiratory distress. She has no wheezes. She has no rales.   Abdominal: Soft. Bowel sounds are normal. She exhibits no distension. There is no tenderness. There is no rebound and no guarding.   Musculoskeletal: Normal range of motion. She exhibits no edema, tenderness or deformity.   Neurological: She is alert and oriented to person, place, and time. No cranial nerve deficit.   Skin: Skin is warm and dry. She is not diaphoretic.   Jaundice   Psychiatric: She has a normal mood and affect. Her behavior is normal. Judgment and thought content normal.       Recent  Labs      09/15/17   2136  09/16/17   0834   WBC  10.2  5.7   RBC  2.11*  2.41*   HEMOGLOBIN  6.4*  7.3*   HEMATOCRIT  19.1*  21.8*   MCV  90.5  90.5   MCH  30.3  30.3   MCHC  33.5*  33.5*   RDW  53.3*  54.1*   PLATELETCT  237  168   MPV  10.7  9.8     Recent Labs      09/15/17   2136  09/16/17   0310   SODIUM  131*  132*   POTASSIUM  2.5*  2.9*   CHLORIDE  90*  94*   CO2  18*  20   GLUCOSE  117*  102*   BUN  14  14   CREATININE  1.53*  1.51*   CALCIUM  7.0*  6.4*     Recent Labs      09/15/17   2136   APTT  28.9   INR  1.12                  Assessment/Plan     * Anemia requiring transfusions- (present on admission)   Assessment & Plan    The patient was recently admitted to our hospital for GI bleed and underwent colonoscopy which showed AVM now status post ablation. The patient also required a transfusion during her last hospital admission. Her current Hemoccult is negative. However, she does have significant anemia requiring transfusion. She'll be monitored closely on telemetry and placed on IV fluid resuscitation for volume expansion. I will continue to trend her hemoglobin levels and start her on IV proton pump inhibitor.     GI planning EGD tomorrow.   MOnitor Hb.   Current treatment to continue        Metabolic acidosis- (present on admission)   Assessment & Plan    Likely secondary to anemia requiring transfusion with decreased tissue perfusion. Expect improvement and closure of her anion gap with IV fluids. Monitor chemistries in the morning.        Other social stressor- (present on admission)   Assessment & Plan    Patient's  just  yesterday, she denies any suicidal ideation but is appropriately depressed. We will continue to monitor.        Hypokalemia- (present on admission)   Assessment & Plan    Patient will be receiving both IV and oral replenishment. I will check a magnesium level and correct if needed. She will be monitored closely on telemetry, currently she has no chest pain nor any  T-wave changes.        Hyponatremia- (present on admission)   Assessment & Plan    This is mild, patient has no mental status changes. IV fluids as noted above and monitor chemistries.        Alcohol abuse- (present on admission)   Assessment & Plan    Monitor for withdrawal        RENEE (acute kidney injury) (CMS-HCC)- (present on admission)   Assessment & Plan    Patient had a recent ultrasound in June which showed no abnormalities, suspect prerenal azotemia and will correct with IV fluids. Repeat renal function in the morning.        UTI (urinary tract infection)- (present on admission)   Assessment & Plan    Ceftriaxzone        Alcoholic hepatitis- (present on admission)   Assessment & Plan    Management per GI team        HTN (hypertension)- (present on admission)   Assessment & Plan    This is chronic and stable, continue home Prinivil        SLE (systemic lupus erythematosus) (CMS-HCC)- (present on admission)   Assessment & Plan    This is chronic and stable, continue home prednisone and Plaquenil          Quality-Core Measures

## 2017-09-17 ENCOUNTER — APPOINTMENT (OUTPATIENT)
Dept: RADIOLOGY | Facility: MEDICAL CENTER | Age: 52
DRG: 812 | End: 2017-09-17
Attending: INTERNAL MEDICINE
Payer: MEDICAID

## 2017-09-17 PROBLEM — E83.51 HYPOCALCEMIA: Status: ACTIVE | Noted: 2017-09-17

## 2017-09-17 LAB
25(OH)D3 SERPL-MCNC: 34 NG/ML (ref 30–100)
ALBUMIN SERPL BCP-MCNC: 3.5 G/DL (ref 3.2–4.9)
ALBUMIN/GLOB SERPL: 1.2 G/DL
ALP SERPL-CCNC: 215 U/L (ref 30–99)
ALT SERPL-CCNC: 13 U/L (ref 2–50)
ANION GAP SERPL CALC-SCNC: 12 MMOL/L (ref 0–11.9)
AST SERPL-CCNC: 68 U/L (ref 12–45)
BASOPHILS # BLD AUTO: 0.7 % (ref 0–1.8)
BASOPHILS # BLD: 0.04 K/UL (ref 0–0.12)
BILIRUB SERPL-MCNC: 3.2 MG/DL (ref 0.1–1.5)
BUN SERPL-MCNC: 13 MG/DL (ref 8–22)
CA-I SERPL-SCNC: 0.9 MMOL/L (ref 1.1–1.3)
CALCIUM SERPL-MCNC: 6.5 MG/DL (ref 8.5–10.5)
CHLORIDE SERPL-SCNC: 102 MMOL/L (ref 96–112)
CO2 SERPL-SCNC: 19 MMOL/L (ref 20–33)
CREAT SERPL-MCNC: 1.37 MG/DL (ref 0.5–1.4)
EKG IMPRESSION: NORMAL
EOSINOPHIL # BLD AUTO: 0 K/UL (ref 0–0.51)
EOSINOPHIL NFR BLD: 0 % (ref 0–6.9)
ERYTHROCYTE [DISTWIDTH] IN BLOOD BY AUTOMATED COUNT: 56.7 FL (ref 35.9–50)
GFR SERPL CREATININE-BSD FRML MDRD: 41 ML/MIN/1.73 M 2
GLOBULIN SER CALC-MCNC: 2.9 G/DL (ref 1.9–3.5)
GLUCOSE SERPL-MCNC: 95 MG/DL (ref 65–99)
HCT VFR BLD AUTO: 24.3 % (ref 37–47)
HGB BLD-MCNC: 6.9 G/DL (ref 12–16)
HGB BLD-MCNC: 7.9 G/DL (ref 12–16)
IMM GRANULOCYTES # BLD AUTO: 0.04 K/UL (ref 0–0.11)
IMM GRANULOCYTES NFR BLD AUTO: 0.7 % (ref 0–0.9)
INR PPP: 0.97 (ref 0.87–1.13)
LYMPHOCYTES # BLD AUTO: 0.85 K/UL (ref 1–4.8)
LYMPHOCYTES NFR BLD: 13.8 % (ref 22–41)
MAGNESIUM SERPL-MCNC: 1.9 MG/DL (ref 1.5–2.5)
MCH RBC QN AUTO: 30.4 PG (ref 27–33)
MCHC RBC AUTO-ENTMCNC: 32.5 G/DL (ref 33.6–35)
MCV RBC AUTO: 93.5 FL (ref 81.4–97.8)
MONOCYTES # BLD AUTO: 0.61 K/UL (ref 0–0.85)
MONOCYTES NFR BLD AUTO: 9.9 % (ref 0–13.4)
NEUTROPHILS # BLD AUTO: 4.61 K/UL (ref 2–7.15)
NEUTROPHILS NFR BLD: 74.9 % (ref 44–72)
NRBC # BLD AUTO: 0 K/UL
NRBC BLD AUTO-RTO: 0 /100 WBC
PHOSPHATE SERPL-MCNC: 1.5 MG/DL (ref 2.5–4.5)
PLATELET # BLD AUTO: 253 K/UL (ref 164–446)
PMV BLD AUTO: 9.9 FL (ref 9–12.9)
POTASSIUM SERPL-SCNC: 3.4 MMOL/L (ref 3.6–5.5)
PROT SERPL-MCNC: 6.4 G/DL (ref 6–8.2)
PROTHROMBIN TIME: 13.2 SEC (ref 12–14.6)
PTH-INTACT SERPL-MCNC: 167.1 PG/ML (ref 14–72)
RBC # BLD AUTO: 2.6 M/UL (ref 4.2–5.4)
SODIUM SERPL-SCNC: 133 MMOL/L (ref 135–145)
WBC # BLD AUTO: 6.2 K/UL (ref 4.8–10.8)

## 2017-09-17 PROCEDURE — 93010 ELECTROCARDIOGRAM REPORT: CPT | Performed by: INTERNAL MEDICINE

## 2017-09-17 PROCEDURE — 700111 HCHG RX REV CODE 636 W/ 250 OVERRIDE (IP): Performed by: HOSPITALIST

## 2017-09-17 PROCEDURE — 160035 HCHG PACU - 1ST 60 MINS PHASE I: Performed by: INTERNAL MEDICINE

## 2017-09-17 PROCEDURE — A9270 NON-COVERED ITEM OR SERVICE: HCPCS | Performed by: INTERNAL MEDICINE

## 2017-09-17 PROCEDURE — 88305 TISSUE EXAM BY PATHOLOGIST: CPT

## 2017-09-17 PROCEDURE — 700101 HCHG RX REV CODE 250: Performed by: HOSPITALIST

## 2017-09-17 PROCEDURE — 700102 HCHG RX REV CODE 250 W/ 637 OVERRIDE(OP): Performed by: HOSPITALIST

## 2017-09-17 PROCEDURE — 99152 MOD SED SAME PHYS/QHP 5/>YRS: CPT | Performed by: INTERNAL MEDICINE

## 2017-09-17 PROCEDURE — 700101 HCHG RX REV CODE 250: Performed by: INTERNAL MEDICINE

## 2017-09-17 PROCEDURE — 160048 HCHG OR STATISTICAL LEVEL 1-5: Performed by: INTERNAL MEDICINE

## 2017-09-17 PROCEDURE — 700102 HCHG RX REV CODE 250 W/ 637 OVERRIDE(OP): Performed by: INTERNAL MEDICINE

## 2017-09-17 PROCEDURE — 36430 TRANSFUSION BLD/BLD COMPNT: CPT

## 2017-09-17 PROCEDURE — 700105 HCHG RX REV CODE 258: Performed by: INTERNAL MEDICINE

## 2017-09-17 PROCEDURE — C9113 INJ PANTOPRAZOLE SODIUM, VIA: HCPCS | Performed by: HOSPITALIST

## 2017-09-17 PROCEDURE — 85018 HEMOGLOBIN: CPT

## 2017-09-17 PROCEDURE — P9016 RBC LEUKOCYTES REDUCED: HCPCS

## 2017-09-17 PROCEDURE — 86923 COMPATIBILITY TEST ELECTRIC: CPT

## 2017-09-17 PROCEDURE — 160002 HCHG RECOVERY MINUTES (STAT): Performed by: INTERNAL MEDICINE

## 2017-09-17 PROCEDURE — 700105 HCHG RX REV CODE 258

## 2017-09-17 PROCEDURE — 770020 HCHG ROOM/CARE - TELE (206)

## 2017-09-17 PROCEDURE — 700111 HCHG RX REV CODE 636 W/ 250 OVERRIDE (IP): Performed by: INTERNAL MEDICINE

## 2017-09-17 PROCEDURE — 0DB98ZX EXCISION OF DUODENUM, VIA NATURAL OR ARTIFICIAL OPENING ENDOSCOPIC, DIAGNOSTIC: ICD-10-PCS | Performed by: INTERNAL MEDICINE

## 2017-09-17 PROCEDURE — 76700 US EXAM ABDOM COMPLETE: CPT

## 2017-09-17 PROCEDURE — 160203 HCHG ENDO MINUTES - 1ST 30 MINS LEVEL 4: Performed by: INTERNAL MEDICINE

## 2017-09-17 PROCEDURE — 93005 ELECTROCARDIOGRAM TRACING: CPT | Performed by: NURSE PRACTITIONER

## 2017-09-17 PROCEDURE — A9270 NON-COVERED ITEM OR SERVICE: HCPCS | Performed by: HOSPITALIST

## 2017-09-17 PROCEDURE — 36415 COLL VENOUS BLD VENIPUNCTURE: CPT

## 2017-09-17 PROCEDURE — 500066 HCHG BITE BLOCK, ECT: Performed by: INTERNAL MEDICINE

## 2017-09-17 PROCEDURE — 99232 SBSQ HOSP IP/OBS MODERATE 35: CPT | Performed by: INTERNAL MEDICINE

## 2017-09-17 PROCEDURE — 502240 HCHG MISC OR SUPPLY RC 0272: Performed by: INTERNAL MEDICINE

## 2017-09-17 RX ORDER — MIDAZOLAM HYDROCHLORIDE 1 MG/ML
INJECTION INTRAMUSCULAR; INTRAVENOUS
Status: DISCONTINUED | OUTPATIENT
Start: 2017-09-17 | End: 2017-09-17 | Stop reason: HOSPADM

## 2017-09-17 RX ORDER — OMEPRAZOLE 20 MG/1
40 CAPSULE, DELAYED RELEASE ORAL DAILY
Status: DISCONTINUED | OUTPATIENT
Start: 2017-09-17 | End: 2017-09-18 | Stop reason: HOSPADM

## 2017-09-17 RX ORDER — POTASSIUM CHLORIDE 20 MEQ/1
40 TABLET, EXTENDED RELEASE ORAL EVERY 4 HOURS
Status: COMPLETED | OUTPATIENT
Start: 2017-09-17 | End: 2017-09-17

## 2017-09-17 RX ORDER — SODIUM CHLORIDE 9 MG/ML
500 INJECTION, SOLUTION INTRAVENOUS
Status: ACTIVE | OUTPATIENT
Start: 2017-09-17 | End: 2017-09-17

## 2017-09-17 RX ORDER — MIDAZOLAM HYDROCHLORIDE 1 MG/ML
INJECTION INTRAMUSCULAR; INTRAVENOUS
Status: DISCONTINUED
Start: 2017-09-17 | End: 2017-09-17

## 2017-09-17 RX ORDER — SUCRALFATE ORAL 1 G/10ML
1 SUSPENSION ORAL
Status: DISCONTINUED | OUTPATIENT
Start: 2017-09-17 | End: 2017-09-18 | Stop reason: HOSPADM

## 2017-09-17 RX ORDER — MAGNESIUM SULFATE HEPTAHYDRATE 40 MG/ML
2 INJECTION, SOLUTION INTRAVENOUS ONCE
Status: COMPLETED | OUTPATIENT
Start: 2017-09-17 | End: 2017-09-17

## 2017-09-17 RX ORDER — SODIUM CHLORIDE 9 MG/ML
INJECTION, SOLUTION INTRAVENOUS
Status: COMPLETED
Start: 2017-09-17 | End: 2017-09-17

## 2017-09-17 RX ORDER — SODIUM CHLORIDE 9 MG/ML
INJECTION, SOLUTION INTRAVENOUS CONTINUOUS
Status: DISCONTINUED | OUTPATIENT
Start: 2017-09-17 | End: 2017-09-17

## 2017-09-17 RX ORDER — SODIUM CHLORIDE AND POTASSIUM CHLORIDE 150; 900 MG/100ML; MG/100ML
INJECTION, SOLUTION INTRAVENOUS CONTINUOUS
Status: DISCONTINUED | OUTPATIENT
Start: 2017-09-17 | End: 2017-09-18

## 2017-09-17 RX ORDER — MIDAZOLAM HYDROCHLORIDE 1 MG/ML
.5-2 INJECTION INTRAMUSCULAR; INTRAVENOUS PRN
Status: ACTIVE | OUTPATIENT
Start: 2017-09-17 | End: 2017-09-17

## 2017-09-17 RX ADMIN — ANTACID TABLETS 500 MG: 500 TABLET, CHEWABLE ORAL at 17:00

## 2017-09-17 RX ADMIN — PREDNISONE 15 MG: 5 TABLET ORAL at 12:34

## 2017-09-17 RX ADMIN — SUCRALFATE 1 G: 1 SUSPENSION ORAL at 19:42

## 2017-09-17 RX ADMIN — SUCRALFATE 1 G: 1 SUSPENSION ORAL at 17:00

## 2017-09-17 RX ADMIN — OXYCODONE HYDROCHLORIDE 5 MG: 5 TABLET ORAL at 00:06

## 2017-09-17 RX ADMIN — POTASSIUM CHLORIDE 40 MEQ: 1500 TABLET, EXTENDED RELEASE ORAL at 17:00

## 2017-09-17 RX ADMIN — POTASSIUM PHOSPHATE, MONOBASIC AND POTASSIUM PHOSPHATE, DIBASIC 30 MMOL: 224; 236 INJECTION, SOLUTION INTRAVENOUS at 14:54

## 2017-09-17 RX ADMIN — OMEPRAZOLE 40 MG: 20 CAPSULE, DELAYED RELEASE ORAL at 12:34

## 2017-09-17 RX ADMIN — LISINOPRIL 20 MG: 20 TABLET ORAL at 12:34

## 2017-09-17 RX ADMIN — POTASSIUM CHLORIDE 40 MEQ: 1500 TABLET, EXTENDED RELEASE ORAL at 12:34

## 2017-09-17 RX ADMIN — MAGNESIUM SULFATE IN WATER 2 G: 40 INJECTION, SOLUTION INTRAVENOUS at 09:15

## 2017-09-17 RX ADMIN — HYDROXYCHLOROQUINE SULFATE 200 MG: 200 TABLET, FILM COATED ORAL at 19:41

## 2017-09-17 RX ADMIN — SODIUM CHLORIDE 500 ML: 9 INJECTION, SOLUTION INTRAVENOUS at 14:55

## 2017-09-17 RX ADMIN — CALCIUM GLUCONATE 2 G: 94 INJECTION, SOLUTION INTRAVENOUS at 09:15

## 2017-09-17 RX ADMIN — MORPHINE SULFATE 4 MG: 4 INJECTION INTRAVENOUS at 21:15

## 2017-09-17 RX ADMIN — OXYCODONE HYDROCHLORIDE 5 MG: 5 TABLET ORAL at 15:53

## 2017-09-17 RX ADMIN — HYDROXYCHLOROQUINE SULFATE 200 MG: 200 TABLET, FILM COATED ORAL at 12:33

## 2017-09-17 RX ADMIN — MORPHINE SULFATE 4 MG: 4 INJECTION INTRAVENOUS at 09:06

## 2017-09-17 RX ADMIN — POTASSIUM CHLORIDE AND SODIUM CHLORIDE: 900; 150 INJECTION, SOLUTION INTRAVENOUS at 17:39

## 2017-09-17 RX ADMIN — PANTOPRAZOLE SODIUM 40 MG: 40 INJECTION, POWDER, FOR SOLUTION INTRAVENOUS at 09:05

## 2017-09-17 RX ADMIN — ANTACID TABLETS 500 MG: 500 TABLET, CHEWABLE ORAL at 12:34

## 2017-09-17 RX ADMIN — CEFTRIAXONE 2 G: 2 INJECTION, POWDER, FOR SOLUTION INTRAMUSCULAR; INTRAVENOUS at 19:42

## 2017-09-17 RX ADMIN — POTASSIUM CHLORIDE AND SODIUM CHLORIDE: 900; 300 INJECTION, SOLUTION INTRAVENOUS at 06:18

## 2017-09-17 ASSESSMENT — LIFESTYLE VARIABLES
AUDITORY DISTURBANCES: NOT PRESENT
HEADACHE, FULLNESS IN HEAD: VERY MILD
TOTAL SCORE: 1
DOES PATIENT WANT TO STOP DRINKING: YES
AUDITORY DISTURBANCES: NOT PRESENT
TOTAL SCORE: 2
ORIENTATION AND CLOUDING OF SENSORIUM: ORIENTED AND CAN DO SERIAL ADDITIONS
AUDITORY DISTURBANCES: NOT PRESENT
TREMOR: NO TREMOR
HEADACHE, FULLNESS IN HEAD: VERY MILD
VISUAL DISTURBANCES: NOT PRESENT
AGITATION: NORMAL ACTIVITY
HEADACHE, FULLNESS IN HEAD: VERY MILD
HEADACHE, FULLNESS IN HEAD: VERY MILD
TREMOR: NO TREMOR
HOW MANY TIMES IN THE PAST YEAR HAVE YOU HAD 5 OR MORE DRINKS IN A DAY: 163
ORIENTATION AND CLOUDING OF SENSORIUM: ORIENTED AND CAN DO SERIAL ADDITIONS
VISUAL DISTURBANCES: NOT PRESENT
PAROXYSMAL SWEATS: NO SWEAT VISIBLE
AGITATION: NORMAL ACTIVITY
NAUSEA AND VOMITING: NO NAUSEA AND NO VOMITING
ORIENTATION AND CLOUDING OF SENSORIUM: ORIENTED AND CAN DO SERIAL ADDITIONS
SUBSTANCE_ABUSE: 1
ANXIETY: MILDLY ANXIOUS
TOTAL SCORE: 2
TOTAL SCORE: 3
TREMOR: NO TREMOR
AGITATION: NORMAL ACTIVITY
AUDITORY DISTURBANCES: NOT PRESENT
VISUAL DISTURBANCES: NOT PRESENT
ALCOHOL_USE: YES
ON A TYPICAL DAY WHEN YOU DRINK ALCOHOL HOW MANY DRINKS DO YOU HAVE: 6
ORIENTATION AND CLOUDING OF SENSORIUM: ORIENTED AND CAN DO SERIAL ADDITIONS
TOTAL SCORE: 3
NAUSEA AND VOMITING: NO NAUSEA AND NO VOMITING
CONSUMPTION TOTAL: POSITIVE
ANXIETY: MILDLY ANXIOUS
ANXIETY: MILDLY ANXIOUS
PAROXYSMAL SWEATS: NO SWEAT VISIBLE
TREMOR: NO TREMOR
VISUAL DISTURBANCES: NOT PRESENT
NAUSEA AND VOMITING: NO NAUSEA AND NO VOMITING
PAROXYSMAL SWEATS: NO SWEAT VISIBLE
NAUSEA AND VOMITING: NO NAUSEA AND NO VOMITING
HAVE YOU EVER FELT YOU SHOULD CUT DOWN ON YOUR DRINKING: YES
ANXIETY: MILDLY ANXIOUS
PAROXYSMAL SWEATS: NO SWEAT VISIBLE
ORIENTATION AND CLOUDING OF SENSORIUM: ORIENTED AND CAN DO SERIAL ADDITIONS
TREMOR: NO TREMOR
EVER FELT BAD OR GUILTY ABOUT YOUR DRINKING: YES
TOTAL SCORE: 2
HEADACHE, FULLNESS IN HEAD: NOT PRESENT
HAVE PEOPLE ANNOYED YOU BY CRITICIZING YOUR DRINKING: NO
AUDITORY DISTURBANCES: NOT PRESENT
VISUAL DISTURBANCES: NOT PRESENT
NAUSEA AND VOMITING: NO NAUSEA AND NO VOMITING
ANXIETY: MILDLY ANXIOUS
AUDITORY DISTURBANCES: NOT PRESENT
TREMOR: NO TREMOR
AGITATION: NORMAL ACTIVITY
TOTAL SCORE: 2
ANXIETY: MILDLY ANXIOUS
PAROXYSMAL SWEATS: NO SWEAT VISIBLE
NAUSEA AND VOMITING: NO NAUSEA AND NO VOMITING
ORIENTATION AND CLOUDING OF SENSORIUM: ORIENTED AND CAN DO SERIAL ADDITIONS
VISUAL DISTURBANCES: NOT PRESENT
HEADACHE, FULLNESS IN HEAD: VERY MILD
PAROXYSMAL SWEATS: NO SWEAT VISIBLE
AGITATION: NORMAL ACTIVITY
TOTAL SCORE: 3
AVERAGE NUMBER OF DAYS PER WEEK YOU HAVE A DRINK CONTAINING ALCOHOL: 5
EVER HAD A DRINK FIRST THING IN THE MORNING TO STEADY YOUR NERVES TO GET RID OF A HANGOVER: YES
TOTAL SCORE: 2
AGITATION: NORMAL ACTIVITY

## 2017-09-17 ASSESSMENT — PAIN SCALES - GENERAL
PAINLEVEL_OUTOF10: 0
PAINLEVEL_OUTOF10: 7
PAINLEVEL_OUTOF10: 9
PAINLEVEL_OUTOF10: 9
PAINLEVEL_OUTOF10: 0
PAINLEVEL_OUTOF10: 8
PAINLEVEL_OUTOF10: 8
PAINLEVEL_OUTOF10: 0
PAINLEVEL_OUTOF10: 0
PAINLEVEL_OUTOF10: 9

## 2017-09-17 ASSESSMENT — ENCOUNTER SYMPTOMS
HEMOPTYSIS: 0
VOMITING: 0
PALPITATIONS: 0
SEIZURES: 0
CHILLS: 0
LOSS OF CONSCIOUSNESS: 0
INSOMNIA: 1
DOUBLE VISION: 0
HEARTBURN: 0
NECK PAIN: 0
DIAPHORESIS: 0
SPEECH CHANGE: 0
SHORTNESS OF BREATH: 0
FALLS: 0
DIZZINESS: 0
CONSTIPATION: 0
PND: 0
WEAKNESS: 1
SENSORY CHANGE: 0
TREMORS: 0
COUGH: 0
HALLUCINATIONS: 0
BACK PAIN: 0
DIARRHEA: 0
BLOOD IN STOOL: 0
MYALGIAS: 0
DEPRESSION: 1
TINGLING: 0
NAUSEA: 1
MEMORY LOSS: 0
ABDOMINAL PAIN: 1
FLANK PAIN: 0
HEADACHES: 0
FEVER: 0
NERVOUS/ANXIOUS: 1
FOCAL WEAKNESS: 0
BLURRED VISION: 0

## 2017-09-17 NOTE — PROGRESS NOTES
BEDSIDE REPORT COMPLETED, PT SITTING UP IN BED WITH SISTER AT BEDSIDE, UPDATED ON PLAN OF CARE. PT VERBALIZED UNDERSTANDING. NO NEEDS AT THIS TIME, NO COMPLAINTS OF PAIN BUT PT DID COMPLAIN OF DIARRHEA, CALL LIGHT IN REACH, BED IN LOWEST POSITION, SIDE RAILS UP X2, NO DISTRESS NOTED.

## 2017-09-17 NOTE — PROGRESS NOTES
BEKAH from Lab called with critical result of CALCIUM 6.5 at 0040. Critical lab result read back to BEKAH.  MISHA JACKSON notified of critical lab result at 0043.  Critical lab result read back by MISHA JACKSON.

## 2017-09-17 NOTE — PROCEDURES
Pre-procedure Diagnoses:   Gastrointestinal hemorrhage with melena [K92.1]   Bilious vomiting with nausea [R11.14]   Anemia due to chronic blood loss [D50.0]   Gastroesophageal reflux disease with esophagitis [K21.0]   Post-procedure Diagnoses:   Reflux esophagitis [K21.0]   Procedures:   EGD W/ASP/BX [516276]       Endoscopist: Tripp Lagunas MD, [facility], [facility] [place]    Moderate sedation: Fentanyl 125mcg, Versed 4 mg IV    Consent: Risks, benefits, and alternatives were discussed with patient.  Consenting person was given an opportunity to ask questions and discuss other options.  Risks including but not limited to perforation, infection, bleeding, missed lesion(s), cardiac and/or pulmonary event, aspiration, stroke, possible need for surgery and/or interventional radiology, hospitalization possibly prolonged, discomfort, unsuccessful and/or incomplete procedure, ineffective therapy and/or persistent symptoms, possible need for repeat procedures and/or additional testings, damage to adjacent organs and/or vascular structures, medication reaction, disability, death, and other adverse events possibly life-threatening.  Discussion was undertaken with Layman's terms.  Consenting person stated understanding and acceptance of these risks, and wished to proceed.  Informed consent was given in clear state of mind.    Endoscopic procedures in detail:   Diagnostic endoscope was inserted from mouth to second portion of the duodenum, retroflexion was performed in the stomach.  Duodenal mucosal biopsies were obtained. There was suction of insufflated air and stomach fluid contents upon removal.      Procedure times:  - Start 10:33  - Completed 10:39    Endoscopic Findings:  - Esophagus: reflux esophagitis, exudative, LA grade B, appearance not suggestive of opportunistic infections, no esophageal ulcers nor Chelsey Roldan tear.  - Stomach: endoscopically unremarkable, no evidence of portal hypertensive gastropathy nor fundic varices.  -  Duodenum: scalloping and blunted mucosa, duodenal biopsies obtained to evaluate for sprue.    Impression: Reflux esophagitis    Recommendations:   1.  Routine post-endoscopy moderate sedation recovery care.  Transfer patient back to prior hospital room when she is awake, alert and comfortable, when recovery riteria are met.  Aspiration and fall precautions x 24 hours.   2. Follow-up duodenal biopsy results  3. Prilosec 40mg PO QD indefinitely  4. Most importantly alcohol cessation  5. I spoke to patient and endo RN about impression, diagnosis and recommendations.  6. Please note that Dr. Abdon Boone will taking over [facility] inpatient service tomorrow/Monday.

## 2017-09-17 NOTE — PROGRESS NOTES
Notified Dr. Radford of pt's EGD results and hemoglobin of 6.9. Orders to place pt on clear liquid diet and transfuse 1 unit prbcs now. Read back by Germania HASTINGS.

## 2017-09-17 NOTE — PROGRESS NOTES
Pt observed. Pt c/o left flank pain at this time  Pt AAOx4. Pt to have EGD and abdominal ultrasound today. No other signs or symptoms of distress, fall precautions in place, call light within reach, all questions answered, will continue to monitor.

## 2017-09-17 NOTE — PROGRESS NOTES
PT HAS SLEPT SOME BUT FREQUENT TRIPS TO THE BEDSIDE COMMODE HAS PREVENTED HER FROM A LENGTHY SLEEP. PT IS NOW RESTING WITH EYES CLOSED.

## 2017-09-17 NOTE — CARE PLAN
Problem: Safety  Goal: Will remain free from injury    Intervention: Provide assistance with mobility  ASSISTED PT TO BEDSIDE COMMODE. PT HAS ON NON-SLIP, BED IN LOWEST POSITION AND PT USES CALL  LIGHT. NO FALLS THIS SHIFT

## 2017-09-17 NOTE — DIETARY
Nutrition Services - Pt is NPO at this time and not appropriate for supplements. RD will monitor.

## 2017-09-17 NOTE — PROGRESS NOTES
PT RESTING IN BED COMFORTABLY. OTHER THAN FREQUENT VISITS TO THE BATHROOM, PT HAD AN  UNEVENTFUL NIGHT.     MONITOR SUMMARY:       WITH RARE COUP AND RARE PVC    .16/.08/ .30

## 2017-09-18 ENCOUNTER — PATIENT OUTREACH (OUTPATIENT)
Dept: HEALTH INFORMATION MANAGEMENT | Facility: OTHER | Age: 52
End: 2017-09-18

## 2017-09-18 VITALS
TEMPERATURE: 98.4 F | HEIGHT: 63 IN | WEIGHT: 179.68 LBS | HEART RATE: 107 BPM | SYSTOLIC BLOOD PRESSURE: 149 MMHG | DIASTOLIC BLOOD PRESSURE: 94 MMHG | BODY MASS INDEX: 31.84 KG/M2 | OXYGEN SATURATION: 97 % | RESPIRATION RATE: 19 BRPM

## 2017-09-18 PROBLEM — E87.1 HYPONATREMIA: Status: RESOLVED | Noted: 2017-08-04 | Resolved: 2017-09-18

## 2017-09-18 PROBLEM — N17.9 AKI (ACUTE KIDNEY INJURY) (HCC): Status: RESOLVED | Noted: 2017-05-28 | Resolved: 2017-09-18

## 2017-09-18 PROBLEM — N39.0 UTI (URINARY TRACT INFECTION): Status: RESOLVED | Noted: 2017-09-16 | Resolved: 2017-09-18

## 2017-09-18 PROBLEM — E87.6 HYPOKALEMIA: Status: RESOLVED | Noted: 2017-08-04 | Resolved: 2017-09-18

## 2017-09-18 PROBLEM — E83.42 HYPOMAGNESEMIA: Status: ACTIVE | Noted: 2017-09-18

## 2017-09-18 PROBLEM — D64.9 ANEMIA REQUIRING TRANSFUSIONS: Status: RESOLVED | Noted: 2017-09-15 | Resolved: 2017-09-18

## 2017-09-18 LAB
ALBUMIN SERPL BCP-MCNC: 2.8 G/DL (ref 3.2–4.9)
ALBUMIN/GLOB SERPL: 1.2 G/DL
ALP SERPL-CCNC: 187 U/L (ref 30–99)
ALT SERPL-CCNC: 14 U/L (ref 2–50)
ANION GAP SERPL CALC-SCNC: 7 MMOL/L (ref 0–11.9)
AST SERPL-CCNC: 74 U/L (ref 12–45)
BACTERIA UR CULT: NORMAL
BASOPHILS # BLD AUTO: 0.9 % (ref 0–1.8)
BASOPHILS # BLD: 0.05 K/UL (ref 0–0.12)
BILIRUB SERPL-MCNC: 1.9 MG/DL (ref 0.1–1.5)
BUN SERPL-MCNC: 7 MG/DL (ref 8–22)
CA-I SERPL-SCNC: 1 MMOL/L (ref 1.1–1.3)
CALCIUM SERPL-MCNC: 6.9 MG/DL (ref 8.5–10.5)
CHLORIDE SERPL-SCNC: 110 MMOL/L (ref 96–112)
CO2 SERPL-SCNC: 19 MMOL/L (ref 20–33)
CREAT SERPL-MCNC: 0.78 MG/DL (ref 0.5–1.4)
EKG IMPRESSION: NORMAL
EOSINOPHIL # BLD AUTO: 0.04 K/UL (ref 0–0.51)
EOSINOPHIL NFR BLD: 0.7 % (ref 0–6.9)
ERYTHROCYTE [DISTWIDTH] IN BLOOD BY AUTOMATED COUNT: 64 FL (ref 35.9–50)
GFR SERPL CREATININE-BSD FRML MDRD: >60 ML/MIN/1.73 M 2
GLOBULIN SER CALC-MCNC: 2.4 G/DL (ref 1.9–3.5)
GLUCOSE SERPL-MCNC: 99 MG/DL (ref 65–99)
HCT VFR BLD AUTO: 23.1 % (ref 37–47)
HGB BLD-MCNC: 7.3 G/DL (ref 12–16)
IMM GRANULOCYTES # BLD AUTO: 0.06 K/UL (ref 0–0.11)
IMM GRANULOCYTES NFR BLD AUTO: 1.1 % (ref 0–0.9)
INR PPP: 1.04 (ref 0.87–1.13)
LYMPHOCYTES # BLD AUTO: 0.94 K/UL (ref 1–4.8)
LYMPHOCYTES NFR BLD: 17.3 % (ref 22–41)
MAGNESIUM SERPL-MCNC: 1.4 MG/DL (ref 1.5–2.5)
MCH RBC QN AUTO: 29.8 PG (ref 27–33)
MCHC RBC AUTO-ENTMCNC: 31.6 G/DL (ref 33.6–35)
MCV RBC AUTO: 94.3 FL (ref 81.4–97.8)
MONOCYTES # BLD AUTO: 0.67 K/UL (ref 0–0.85)
MONOCYTES NFR BLD AUTO: 12.4 % (ref 0–13.4)
NEUTROPHILS # BLD AUTO: 3.66 K/UL (ref 2–7.15)
NEUTROPHILS NFR BLD: 67.6 % (ref 44–72)
NRBC # BLD AUTO: 0 K/UL
NRBC BLD AUTO-RTO: 0 /100 WBC
PHOSPHATE SERPL-MCNC: 1.5 MG/DL (ref 2.5–4.5)
PLATELET # BLD AUTO: 225 K/UL (ref 164–446)
PMV BLD AUTO: 9.6 FL (ref 9–12.9)
POTASSIUM SERPL-SCNC: 4.9 MMOL/L (ref 3.6–5.5)
PROT SERPL-MCNC: 5.2 G/DL (ref 6–8.2)
PROTHROMBIN TIME: 13.9 SEC (ref 12–14.6)
RBC # BLD AUTO: 2.45 M/UL (ref 4.2–5.4)
SIGNIFICANT IND 70042: NORMAL
SITE SITE: NORMAL
SODIUM SERPL-SCNC: 136 MMOL/L (ref 135–145)
SOURCE SOURCE: NORMAL
WBC # BLD AUTO: 5.4 K/UL (ref 4.8–10.8)

## 2017-09-18 PROCEDURE — A9270 NON-COVERED ITEM OR SERVICE: HCPCS | Performed by: HOSPITALIST

## 2017-09-18 PROCEDURE — 85610 PROTHROMBIN TIME: CPT

## 2017-09-18 PROCEDURE — 700102 HCHG RX REV CODE 250 W/ 637 OVERRIDE(OP): Performed by: INTERNAL MEDICINE

## 2017-09-18 PROCEDURE — 700102 HCHG RX REV CODE 250 W/ 637 OVERRIDE(OP): Performed by: HOSPITALIST

## 2017-09-18 PROCEDURE — 700111 HCHG RX REV CODE 636 W/ 250 OVERRIDE (IP): Performed by: HOSPITALIST

## 2017-09-18 PROCEDURE — 93010 ELECTROCARDIOGRAM REPORT: CPT | Performed by: INTERNAL MEDICINE

## 2017-09-18 PROCEDURE — 99239 HOSP IP/OBS DSCHRG MGMT >30: CPT | Performed by: HOSPITALIST

## 2017-09-18 PROCEDURE — 83735 ASSAY OF MAGNESIUM: CPT

## 2017-09-18 PROCEDURE — 80053 COMPREHEN METABOLIC PANEL: CPT

## 2017-09-18 PROCEDURE — 85025 COMPLETE CBC W/AUTO DIFF WBC: CPT

## 2017-09-18 PROCEDURE — A9270 NON-COVERED ITEM OR SERVICE: HCPCS | Performed by: INTERNAL MEDICINE

## 2017-09-18 PROCEDURE — 93005 ELECTROCARDIOGRAM TRACING: CPT | Performed by: NURSE PRACTITIONER

## 2017-09-18 PROCEDURE — 82330 ASSAY OF CALCIUM: CPT

## 2017-09-18 PROCEDURE — 700101 HCHG RX REV CODE 250: Performed by: INTERNAL MEDICINE

## 2017-09-18 PROCEDURE — 36415 COLL VENOUS BLD VENIPUNCTURE: CPT

## 2017-09-18 PROCEDURE — 84100 ASSAY OF PHOSPHORUS: CPT

## 2017-09-18 RX ORDER — CALCIUM CARBONATE 500 MG/1
500 TABLET, CHEWABLE ORAL
Qty: 15 TAB | Refills: 0 | Status: SHIPPED | OUTPATIENT
Start: 2017-09-18 | End: 2017-09-23

## 2017-09-18 RX ORDER — SUCRALFATE ORAL 1 G/10ML
1 SUSPENSION ORAL
Qty: 120 ML | Refills: 0 | Status: SHIPPED | OUTPATIENT
Start: 2017-09-18 | End: 2017-09-21

## 2017-09-18 RX ORDER — MAGNESIUM SULFATE HEPTAHYDRATE 40 MG/ML
4 INJECTION, SOLUTION INTRAVENOUS ONCE
Status: COMPLETED | OUTPATIENT
Start: 2017-09-18 | End: 2017-09-18

## 2017-09-18 RX ADMIN — ANTACID TABLETS 500 MG: 500 TABLET, CHEWABLE ORAL at 10:03

## 2017-09-18 RX ADMIN — OMEPRAZOLE 40 MG: 20 CAPSULE, DELAYED RELEASE ORAL at 10:02

## 2017-09-18 RX ADMIN — POTASSIUM CHLORIDE AND SODIUM CHLORIDE: 900; 150 INJECTION, SOLUTION INTRAVENOUS at 06:07

## 2017-09-18 RX ADMIN — MAGNESIUM SULFATE HEPTAHYDRATE 4 G: 40 INJECTION, SOLUTION INTRAVENOUS at 10:02

## 2017-09-18 RX ADMIN — SUCRALFATE 1 G: 1 SUSPENSION ORAL at 06:01

## 2017-09-18 RX ADMIN — SUCRALFATE 1 G: 1 SUSPENSION ORAL at 12:36

## 2017-09-18 RX ADMIN — DIBASIC SODIUM PHOSPHATE, MONOBASIC POTASSIUM PHOSPHATE AND MONOBASIC SODIUM PHOSPHATE 2 TABLET: 852; 155; 130 TABLET ORAL at 10:02

## 2017-09-18 RX ADMIN — HYDROXYCHLOROQUINE SULFATE 200 MG: 200 TABLET, FILM COATED ORAL at 10:06

## 2017-09-18 RX ADMIN — PREDNISONE 15 MG: 5 TABLET ORAL at 10:03

## 2017-09-18 RX ADMIN — OXYCODONE HYDROCHLORIDE 5 MG: 5 TABLET ORAL at 10:03

## 2017-09-18 RX ADMIN — LISINOPRIL 20 MG: 20 TABLET ORAL at 10:03

## 2017-09-18 ASSESSMENT — LIFESTYLE VARIABLES
NAUSEA AND VOMITING: NO NAUSEA AND NO VOMITING
VISUAL DISTURBANCES: NOT PRESENT
AGITATION: NORMAL ACTIVITY
AUDITORY DISTURBANCES: NOT PRESENT
ORIENTATION AND CLOUDING OF SENSORIUM: ORIENTED AND CAN DO SERIAL ADDITIONS
VISUAL DISTURBANCES: NOT PRESENT
PAROXYSMAL SWEATS: NO SWEAT VISIBLE
ORIENTATION AND CLOUDING OF SENSORIUM: ORIENTED AND CAN DO SERIAL ADDITIONS
HEADACHE, FULLNESS IN HEAD: NOT PRESENT
TOTAL SCORE: 0
ANXIETY: NO ANXIETY (AT EASE)
TOTAL SCORE: 0
ANXIETY: NO ANXIETY (AT EASE)
AGITATION: NORMAL ACTIVITY
ORIENTATION AND CLOUDING OF SENSORIUM: ORIENTED AND CAN DO SERIAL ADDITIONS
AGITATION: NORMAL ACTIVITY
NAUSEA AND VOMITING: NO NAUSEA AND NO VOMITING
TREMOR: NO TREMOR
TOTAL SCORE: 0
PAROXYSMAL SWEATS: NO SWEAT VISIBLE
NAUSEA AND VOMITING: NO NAUSEA AND NO VOMITING
TREMOR: NO TREMOR
TREMOR: NO TREMOR
PAROXYSMAL SWEATS: NO SWEAT VISIBLE
AUDITORY DISTURBANCES: NOT PRESENT
VISUAL DISTURBANCES: NOT PRESENT
AUDITORY DISTURBANCES: NOT PRESENT
ANXIETY: NO ANXIETY (AT EASE)

## 2017-09-18 ASSESSMENT — ENCOUNTER SYMPTOMS
ABDOMINAL PAIN: 0
FEVER: 0
NAUSEA: 0
CHILLS: 0
DEPRESSION: 1
BLOOD IN STOOL: 0
VOMITING: 0

## 2017-09-18 ASSESSMENT — PAIN SCALES - GENERAL
PAINLEVEL_OUTOF10: 5
PAINLEVEL_OUTOF10: 7
PAINLEVEL_OUTOF10: 7
PAINLEVEL_OUTOF10: 6

## 2017-09-18 NOTE — CARE PLAN
Problem: Safety  Goal: Will remain free from falls    Intervention: Implement fall precautions  PT WAS GETTING UP TO BEDSIDE COMMODE. AFTER GIVING HER MORPHINE I EDUCATED HER ON THE SIDE EFFECTS AND THAT IT COULD MAKE HER DIZZY. SHE WAS INSTRUCTED TO CALL FOR ASSISTANCE TO THE BSC. CALL LIGHT IN REACH, BED ALARM IN USE, NON-SLIP SOCK ON AND BED IN LOWEST POSITION.

## 2017-09-18 NOTE — PROGRESS NOTES
Pt observed. Pt c/o left flank pain at this time  Pt AAOx4, no other signs or symptoms of distress, fall precautions in place, call light within reach, all questions answered, will continue to monitor.

## 2017-09-18 NOTE — PROGRESS NOTES
[facility] Hospitalist Progress Note    Date of Service: 2017    Chief Complaint  51 y.o. female admitted 9/15/2017 with Urinary retention, dark stook, abdom pain, N/V.     Interval Problem Update  Patient seen and evaluated on rounds. HX ETOH abuse. Here with ETOH hepatitis. GIB. GI on board. EGD today with esophagitis. On CIWA protocol. ETOH related profound electrolyte abnormalities. Diet resumed. Feeling much better now. Reports plans to go to [facility] to live with her sister. US abdomen obtained and reviewed by me. UTI treated with ceftriaxone.     Consultants/Specialty  Gastroenterolgoy    Disposition  Ongoing acute issues        Review of Systems   Constitutional: Positive for malaise/fatigue. Negative for chills, diaphoresis and fever.   HENT: Negative for hearing loss.    Eyes: Negative for blurred vision and double vision.   Respiratory: Negative for cough, hemoptysis and shortness of breath.    Cardiovascular: Negative for chest pain, palpitations, leg swelling and PND.   Gastrointestinal: Positive for abdominal pain (chronic per patient), melena and nausea. Negative for blood in stool, constipation, diarrhea, heartburn and vomiting.   Genitourinary: Negative for dysuria, flank pain, frequency, hematuria and urgency.   Musculoskeletal: Negative for back pain, falls, joint pain, myalgias and neck pain.   Skin: Negative for itching and rash.   Neurological: Positive for weakness. Negative for dizziness, tingling, tremors, sensory change, speech change, focal weakness, seizures, loss of consciousness and headaches.   Psychiatric/Behavioral: Positive for depression and substance abuse. Negative for hallucinations, memory loss and suicidal ideas. The patient is nervous/anxious and has insomnia.       Physical Exam  Laboratory/Imaging   Hemodynamics  Temp (24hrs), Av.5 °C (97.7 °F), Min:36.2 °C (97.2 °F), Max:36.8 °C (98.3 °F)   Temperature: 36.8 °C (98.3 °F)  Pulse  Av  Min: 68  Max: 126 Heart Rate  (Monitored): 93  Blood Pressure: 142/98, NIBP: 104/65      Respiratory      Respiration: 18, Pulse Oximetry: 96 %        RUL Breath Sounds: Clear, RML Breath Sounds: Diminished, RLL Breath Sounds: Diminished, ARNOL Breath Sounds: Diminished, LLL Breath Sounds: Diminished    Fluids    Intake/Output Summary (Last 24 hours) at 09/17/17 1711  Last data filed at 09/17/17 1659   Gross per 24 hour   Intake          2691.67 ml   Output             1800 ml   Net           891.67 ml       Nutrition  Orders Placed This Encounter   Procedures   • DIET ORDER     Standing Status:   Standing     Number of Occurrences:   1     Order Specific Question:   Diet:     Answer:   Hepatic [9]     Order Specific Question:   Diet:     Answer:   Cardiac [6]     Physical Exam   Constitutional: She is oriented to person, place, and time. She appears well-developed and well-nourished. No distress.   HENT:   Head: Normocephalic.   Mouth/Throat: Oropharynx is clear and moist. No oropharyngeal exudate.   Eyes: Pupils are equal, round, and reactive to light. Scleral icterus is present.   Pale conjunctivae   Neck: No JVD present.   Cardiovascular: Normal rate, regular rhythm and normal heart sounds.  Exam reveals no gallop and no friction rub.    No murmur heard.  Pulmonary/Chest: Breath sounds normal. No stridor. No respiratory distress. She has no wheezes. She has no rales.   Abdominal: Soft. Bowel sounds are normal. She exhibits no distension. There is no tenderness. There is no rebound and no guarding.   Musculoskeletal: Normal range of motion. She exhibits no edema, tenderness or deformity.   Neurological: She is alert and oriented to person, place, and time. No cranial nerve deficit.   Skin: Skin is warm and dry. She is not diaphoretic.   Jaundice   Psychiatric: She has a normal mood and affect. Her behavior is normal. Judgment and thought content normal.       Recent Labs      09/15/17   2136  09/16/17   0834  09/16/17   1218  09/16/17   2355   "09/17/17   0748   WBC  10.2  5.7   --   6.2   --    RBC  2.11*  2.41*   --   2.60*   --    HEMOGLOBIN  6.4*  7.3*  7.6*  7.9*  6.9*   HEMATOCRIT  19.1*  21.8*   --   24.3*   --    MCV  90.5  90.5   --   93.5   --    MCH  30.3  30.3   --   30.4   --    MCHC  33.5*  33.5*   --   32.5*   --    RDW  53.3*  54.1*   --   56.7*   --    PLATELETCT  237  168   --   253   --    MPV  10.7  9.8   --   9.9   --      Recent Labs      09/16/17   0310  09/16/17   1218  09/16/17   2354   SODIUM  132*  131*  133*   POTASSIUM  2.9*  3.4*  3.4*   CHLORIDE  94*  98  102   CO2  20  20  19*   GLUCOSE  102*  159*  95   BUN  14  16  13   CREATININE  1.51*  1.63*  1.37   CALCIUM  6.4*  6.3*  6.5*     Recent Labs      09/15/17   2136  09/16/17   1218  09/16/17   2354   APTT  28.9   --    --    INR  1.12  1.06  0.97                  Assessment/Plan     * Anemia requiring transfusions- (present on admission)   Assessment & Plan    08/2017 colonoscopy revealing \"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 Monitor.         Hyponatremia- (present on admission)   Assessment & Plan    This is mild, patient has no mental status changes. IV fluids as noted above and monitor chemistries.        Alcohol abuse- (present on admission)   Assessment & Plan    Monitor for withdrawal        RENEE (acute kidney injury) (CMS-HCC)- (present on admission)   Assessment & Plan    Improving  Continue to monitor  Gentle IVF hydration        Hypocalcemia- (present on admission)   Assessment & " Plan    TUMs/replaced  PTH is high  Monitor  PRBC related ?        UTI (urinary tract infection)- (present on admission)   Assessment & Plan    Ceftriaxzone, de escalate based on culture results        Other social stressor- (present on admission)   Assessment & Plan    Patient's  just  yesterday, she denies any suicidal ideation but is appropriately depressed. We will continue to monitor.        HTN (hypertension)- (present on admission)   Assessment & Plan    This is chronic and stable, continue home Prinivil        SLE (systemic lupus erythematosus) (CMS-HCC)- (present on admission)   Assessment & Plan    This is chronic and stable, continue home prednisone and Plaquenil            Reviewed items::  Labs reviewed, Medications reviewed and Radiology images reviewed  Kovacs catheter::  No Kovacs  DVT prophylaxis pharmacological::  Contraindicated - Anemia requiring blood transfusion  Antibiotics:  Treating active infection/contamination beyond 24 hours perioperative coverage

## 2017-09-18 NOTE — PROGRESS NOTES
Gastroenterology Progress Note     Author: Abdon Boone   Date & Time Created: 9/18/2017 1:20 PM    Interval History:  Reflux esophagitis    EGD showed erosive esophagitis. Feeling better today. Wants to go home. No melena. No HB. No abd pain    Review of Systems:  Review of Systems   Constitutional: Negative for chills and fever.   Cardiovascular: Negative for chest pain.   Gastrointestinal: Negative for abdominal pain, blood in stool, melena, nausea and vomiting.   Psychiatric/Behavioral: Positive for depression.       Physical Exam:  Physical Exam   Constitutional: She is oriented to person, place, and time. She appears well-developed and well-nourished.   HENT:   Head: Atraumatic.   Eyes: EOM are normal. No scleral icterus.   Abdominal: Soft. She exhibits no distension. There is no tenderness. There is no rebound.   Neurological: She is alert and oriented to person, place, and time.   Skin: Skin is warm and dry.   Psychiatric: She has a normal mood and affect.       Labs:        Invalid input(s): [id number]  Recent Labs      09/15/17   2136   TROPONINI  <0.01     Recent Labs      09/16/17 1218 09/16/17 2354 09/18/17   0503   SODIUM  131*  133*  136   POTASSIUM  3.4*  3.4*  4.9   CHLORIDE  98  102  110   CO2  20  19*  19*   BUN  16  13  7*   CREATININE  1.63*  1.37  0.78   MAGNESIUM  2.5  1.9  1.4*   PHOSPHORUS  4.0  1.5*  1.5*   CALCIUM  6.3*  6.5*  6.9*     Recent Labs      09/15/17   2136   09/16/17   1218  09/16/17   2354  09/18/17   0503   ALTSGPT  15   --   11  13  14   ASTSGOT  67*   --   47*  68*  74*   ALKPHOSPHAT  206*   --   176*  215*  187*   TBILIRUBIN  2.7*   --   3.9*  3.2*  1.9*   LIPASE  36   --    --    --    --    GLUCOSE  117*   < >  159*  95  99    < > = values in this interval not displayed.     Recent Labs      09/15/17   2136  09/16/17   0834  09/16/17 1218 09/16/17   2354 09/16/17   2355  09/17/17   0748  09/18/17   0503   RBC  2.11*  2.41*   --    --   2.60*   --   2.45*    HEMOGLOBIN  6.4*  7.3*  7.6*   --   7.9*  6.9*  7.3*   HEMATOCRIT  19.1*  21.8*   --    --   24.3*   --   23.1*   PLATELETCT  237  168   --    --   253   --   225   PROTHROMBTM  14.8*   --   14.1  13.2   --    --   13.9   APTT  28.9   --    --    --    --    --    --    INR  1.12   --   1.06  0.97   --    --   1.04     Recent Labs      17   0834  17   1218  17   2354  17   2355  17   0503   WBC  5.7   --    --   6.2  5.4   NEUTSPOLYS  72.20*   --    --   74.90*  67.60   LYMPHOCYTES  15.70*   --    --   13.80*  17.30*   MONOCYTES  9.90   --    --   9.90  12.40   EOSINOPHILS  0.70   --    --   0.00  0.70   BASOPHILS  1.10   --    --   0.70  0.90   ASTSGOT   --   47*  68*   --   74*   ALTSGPT   --   11  13   --   14   ALKPHOSPHAT   --   176*  215*   --   187*   TBILIRUBIN   --   3.9*  3.2*   --   1.9*     Hemodynamics:  Temp (24hrs), Av.5 °C (97.7 °F), Min:36.3 °C (97.3 °F), Max:36.9 °C (98.4 °F)  Temperature: 36.9 °C (98.4 °F)  Pulse  Av  Min: 68  Max: 126   Blood Pressure: 156/111 (Nurse notified.)     Respiratory:    Respiration: 18, Pulse Oximetry: 95 %        LLL Breath Sounds: Diminished  Fluids:    Intake/Output Summary (Last 24 hours) at 17 1320  Last data filed at 17 0900   Gross per 24 hour   Intake          1931.67 ml   Output             1900 ml   Net            31.67 ml     Weight: 81.5 kg (179 lb 10.8 oz)  GI/Nutrition:  Orders Placed This Encounter   Procedures   • DIET ORDER     Standing Status:   Standing     Number of Occurrences:   1     Order Specific Question:   Diet:     Answer:   Hepatic [9]     Order Specific Question:   Diet:     Answer:   Cardiac [6]     Medical Decision Making, by Problem:  Active Hospital Problems    Diagnosis   • Metabolic acidosis [E87.2]   • Alcoholic hepatitis [K70.10]   • Alcohol abuse [F10.10]   • Hypocalcemia [E83.51]   • Other social stressor [Z65.9]   • HTN (hypertension) [I10]   • SLE (systemic lupus  erythematosus) (CMS-[facility]) [M32.9]   • Hypomagnesemia [E83.42]     Impression and Plan    1) Reflux esophagitis - thought to be the cause of the melena that she had. Discussed the importance of long term use of PPI's to prevent recurrence. Discussed importance of good timing of this medication with food. I will arrange follow up with me  2) Melena - resolved  3) [place] and vomiting - resolved  4) Anemia, acute blood loss    Will sign off.    Abdon Boone MD        Quality-Core Measures

## 2017-09-18 NOTE — DISCHARGE INSTRUCTIONS
Depression, Adult  Depression is feeling sad, low, down in the dumps, blue, gloomy, or empty. In general, there are two kinds of depression:  · Normal sadness or grief. This can happen after something upsetting. It often goes away on its own within 2 weeks. After losing a loved one (bereavement), normal sadness and grief may last longer than two weeks. It usually gets better with time.  · Clinical depression. This kind lasts longer than normal sadness or grief. It keeps you from doing the things you normally do in life. It is often hard to function at home, work, or at school. It may affect your relationships with others. Treatment is often needed.  GET HELP RIGHT AWAY IF:  · You have thoughts about hurting yourself or others.  · You lose touch with reality (psychotic symptoms). You may:  ¨ See or hear things that are not real.  ¨ Have untrue beliefs about your life or people around you.  · Your medicine is giving you problems.  MAKE SURE YOU:  · Understand these instructions.  · Will watch your condition.  · Will get help right away if you are not doing well or get worse.     This information is not intended to replace advice given to you by your health care provider. Make sure you discuss any questions you have with your health care provider.     Document Released: 01/20/2012 Document Revised: 01/08/2016 Document Reviewed: 04/18/2013  [employer] Interactive Patient Education ©2016 [employer]  How Much is Too Much Alcohol?  Drinking too much alcohol can cause injury, accidents, and health problems. These types of problems can include:   · Car crashes.  · Falls.  · Family fighting (domestic violence).  · Drowning.  · Fights.  · Injuries.  · Burns.  · Damage to certain organs.  · Having a baby with birth defects.  ONE DRINK CAN BE TOO MUCH WHEN YOU ARE:  · Working.  · Pregnant or breastfeeding.  · Taking medicines. Ask your doctor.  · Driving or planning to drive.  WHAT IS A STANDARD DRINK?   · 1 regular beer (12  "ounces or 360 milliliters).  · 1 glass of wine (5 ounces or 150 milliliters).  · 1 shot of liquor (1.5 ounces or 45 milliliters).  BLOOD ALCOHOL LEVELS   · .00 A person is sober.  · .03 A person has no trouble keeping balance, talking, or seeing right, but a \"buzz\" may be felt.  · .05 A person feels \"buzzed\" and relaxed.  · .08 or .10  A person is drunk. He or she has trouble talking, seeing right, and keeping his or her balance.  · .15 A person loses body control and may pass out (blackout).  · .20 A person has trouble walking (staggering) and throws up (vomits).  · .30 A person will pass out (unconscious).  · .40+ A person will be in a coma. Death is possible.  If you or someone you know has a drinking problem, get help from a doctor.   Document Released: 10/14/2010 Document Revised: 03/11/2013 Document Reviewed: 10/14/2010  [employer]® Patient Information ©2014 [employer].  Discharge Instructions    Discharged to home by car with relative. Discharged via wheelchair, hospital escort: Yes.  Special equipment needed: Not Applicable    Be sure to schedule a follow-up appointment with your primary care doctor or any specialists as instructed.     Discharge Plan:   Diet Plan: Discussed  Activity Level: Discussed  Confirmed Follow up Appointment: Patient to Call and Schedule Appointment  Confirmed Symptoms Management: Discussed  Medication Reconciliation Updated: Yes  Influenza Vaccine Indication: Patient Refuses    I understand that a diet low in cholesterol, fat, and sodium is recommended for good health. Unless I have been given specific instructions below for another diet, I accept this instruction as my diet prescription.   Other diet: Alcohol abstinence, iron rich foods, maintain goo nutrition    Special Instructions: None    · Is patient discharged on Warfarin / Coumadin?   No     · Is patient Post Blood Transfusion?  Yes  POST BLOOD TRANSFUSION INFORMATION (ADULT)    The purpose of blood transfusion is to " correct anemia, low levels of blood clotting factors or to correct acute blood loss.   Blood transfusion is very safe but occasionally unexpected adverse reactions do occur. Most adverse reactions occur during transfusion, within one to two days following transfusion or one to two weeks following transfusion. Some adverse reactions can occur one to six months after transfusion and some even years later.             If any of the symptoms listed below happen to you during your transfusion,                                 please notify your nurse immediately.   · Fever or Chills  · Flushing of the Face  · Hives, rash or itching  · Difficulty in breathing or shortness of breath  · Pain or oozing of blood from the IV needle site  · Low back pain  · Nausea or vomiting  · Weakness or fainting  · Chest pain  · Blood in the urine  · Decreased frequency of urination    The above symptoms may also occur within 24 to 48 after transfusion; if so, notify your physician.     · Yellowing of the skin    This can happen one to six months after transfusion; if so, notify your physician    Depression / Suicide Risk    As you are discharged from this [facility] facility, it is important to learn how to keep safe from harming yourself.    Recognize the warning signs:  · Abrupt changes in personality, positive or negative- including increase in energy   · Giving away possessions  · Change in eating patterns- significant weight changes-  positive or negative  · Change in sleeping patterns- unable to sleep or sleeping all the time   · Unwillingness or inability to communicate  · Depression  · Unusual sadness, discouragement and loneliness  · Talk of wanting to die  · Neglect of personal appearance   · Rebelliousness- reckless behavior  · Withdrawal from people/activities they love  · Confusion- inability to concentrate     If you or a loved one observes any of these behaviors or has concerns about self-harm, here's what you can  do:  · Talk about it- your feelings and reasons for harming yourself  · Remove any means that you might use to hurt yourself (examples: pills, rope, extension cords, firearm)  · Get professional help from the community (Mental Health, Substance Abuse, psychological counseling)  · Do not be alone:Call your Safe Contact- someone whom you trust who will be there for you.  · Call your local CRISIS HOTLINE 590-1344 or 086-703-8984  · Call your local Children's Mobile Crisis Response Team [place] (918) 106-7112 or wwwLiving Independently Group  · Call the toll free National Suicide Prevention Hotlines   · National Suicide Prevention Lifeline 927-129-ZQSX (3491)  · National [place] Line Network 800-SUICIDE (940-4917)    Anemia, Nonspecific  Anemia is a condition in which the concentration of red blood cells or hemoglobin in the blood is below normal. Hemoglobin is a substance in red blood cells that carries oxygen to the tissues of the body. Anemia results in not enough oxygen reaching these tissues.   CAUSES   Common causes of anemia include:   · Excessive bleeding. Bleeding may be internal or external. This includes excessive bleeding from periods (in women) or from the intestine.    · Poor nutrition.    · Chronic kidney, thyroid, and liver disease.   · Bone marrow disorders that decrease red blood cell production.  · Cancer and treatments for cancer.  · HIV, AIDS, and their treatments.  · Spleen problems that increase red blood cell destruction.  · Blood disorders.  · Excess destruction of red blood cells due to infection, medicines, and autoimmune disorders.  SIGNS AND SYMPTOMS   · Minor weakness.    · Dizziness.    · Headache.  · Palpitations.    · Shortness of breath, especially with exercise.    · Paleness.  · Cold sensitivity.  · Indigestion.  · Nausea.  · Difficulty sleeping.  · Difficulty concentrating.  Symptoms may occur suddenly or they may develop slowly.   DIAGNOSIS   Additional blood tests are often needed. These  help your health care provider determine the best treatment. Your health care provider will check your stool for blood and look for other causes of blood loss.   TREATMENT   Treatment varies depending on the cause of the anemia. Treatment can include:   · Supplements of iron, vitamin B12, or folic acid.    · Hormone medicines.    · A blood transfusion. This may be needed if blood loss is severe.    · Hospitalization. This may be needed if there is significant continual blood loss.    · Dietary changes.  · Spleen removal.  HOME CARE INSTRUCTIONS  Keep all follow-up appointments. It often takes many weeks to correct anemia, and having your health care provider check on your condition and your response to treatment is very important.  SEEK IMMEDIATE MEDICAL CARE IF:   · You develop extreme weakness, shortness of breath, or chest pain.    · You become dizzy or have trouble concentrating.  · You develop heavy vaginal bleeding.    · You develop a rash.    · You have bloody or black, tarry stools.    · You faint.    · You vomit up blood.    · You vomit repeatedly.    · You have abdominal pain.  · You have a fever or persistent symptoms for more than 2-3 days.    · You have a fever and your symptoms suddenly get worse.    · You are dehydrated.    MAKE SURE YOU:  · Understand these instructions.  · Will watch your condition.  · Will get help right away if you are not doing well or get worse.     This information is not intended to replace advice given to you by your health care provider. Make sure you discuss any questions you have with your health care provider.     Document Released: 01/25/2006 Document Revised: 08/20/2014 Document Reviewed: 06/13/2014  [employer] Interactive Patient Education ©2016 [employer]  Anemia, Frequently Asked Questions  WHAT ARE THE SYMPTOMS OF ANEMIA?  · Headache.   · Difficulty thinking.   · Fatigue.   · Shortness of breath.   · Weakness.   · Rapid heartbeat.   AT WHAT POINT ARE PEOPLE  CONSIDERED ANEMIC?   This varies with gender and age.   · Both hemoglobin (Hgb) and hematocrit values are used to define anemia. These lab values are obtained from a complete blood count (CBC) test. This is performed at a caregiver's office.   · The normal range of hemoglobin values for adult men is 14.0 g/dL to 17.4 g/dL. For nonpregnant women, values are 12.3 g/dL to 15.3 g/dL.   · The World Health Organization defines anemia as less than 12 g/dL for nonpregnant women and less than 13 g/dL for men.   · For adult males, the average normal hematocrit is 46%, and the range is 40% to 52%.   · For adult females, the average normal hematocrit is 41%, and the range is 35% to 47%.   · Values that fall below the lower limits can be a sign of anemia and should have further checking (evaluation).   GROUPS OF PEOPLE WHO ARE AT RISK FOR DEVELOPING ANEMIA INCLUDE:   · Infants who are  or taking a formula that is not fortified with iron.   · Children going through a rapid growth spurt. The iron available can not keep up with the needs for a red cell mass which must grow with the child.   · Women in childbearing years. They need iron because of blood loss during menstruation.   · Pregnant women. The growing fetus creates a high demand for iron.   · People with ongoing gastrointestinal blood loss are at risk of developing iron deficiency.   · Individuals with leukemia or cancer who must receive chemotherapy or radiation to treat their disease. The drugs or radiation used to treat these diseases often decreases the bone marrow's ability to make cells of all classes. This includes red blood cells, white blood cells, and platelets.   · Individuals with chronic inflammatory conditions such as rheumatoid arthritis or chronic infections.   · The elderly.   ARE SOME TYPES OF ANEMIA INHERITED?   · Yes, some types of anemia are due to inherited or genetic defects.   · Sickle cell anemia. This occurs most often in people of  , , and Mediterranean descent.   · Thalassemia (or Lopez's anemia). This type is found in people of Mediterranean and Southeast  descent. These types of anemia are common.   · Fanconi. This is rare.   CAN CERTAIN MEDICATIONS CAUSE A PERSON TO BECOME ANEMIC?   Yes. For example, drugs to fight cancer (chemotherapeutic agents) often cause anemia. These drugs can slow the bone marrow's ability to make red blood cells. If there are not enough red blood cells, the body does not get enough oxygen.  WHAT HEMATOCRIT LEVEL IS REQUIRED TO DONATE BLOOD?   The lower limit of an acceptable hematocrit for blood donors is 38%. If you have a low hematocrit value, you should schedule an appointment with your caregiver.  ARE BLOOD TRANSFUSIONS COMMONLY USED TO CORRECT ANEMIA, AND ARE THEY DANGEROUS?   They are used to treat anemia as a last resort. Your caregiver will find the cause of the anemia and correct it if possible. Most blood transfusions are given because of excessive bleeding at the time of surgery, with trauma, or because of bone marrow suppression in patients with cancer or leukemia on chemotherapy. Blood transfusions are safer than ever before. We also know that blood transfusions affect the immune system and may increase certain risks. There is also a concern for human error. In 1/16,000 transfusions, a patient receives a transfusion of blood that is not matched with his or her blood type.   WHAT IS IRON DEFICIENCY ANEMIA AND CAN I CORRECT IT BY CHANGING MY DIET?   Iron is an essential part of hemoglobin. Without enough hemoglobin, anemia develops and the body does not get the right amount of oxygen. Iron deficiency anemia develops after the body has had a low level of iron for a long time. This is either caused by blood loss, not taking in or absorbing enough iron, or increased demands for iron (like pregnancy or rapid growth).   Foods from animal origin such as beef, chicken, and pork, are  good sources of iron. Be sure to have one of these foods at each meal. Vitamin C helps your body absorb iron. Foods rich in Vitamin C include citrus, bell pepper, strawberries, spinach and cantaloupe. In some cases, iron supplements may be needed in order to correct the iron deficiency. In the case of poor absorption, extra iron may have to be given directly into the vein through a needle (intravenously).  I HAVE BEEN DIAGNOSED WITH IRON DEFICIENCY ANEMIA AND MY CAREGIVER PRESCRIBED IRON SUPPLEMENTS. HOW LONG WILL IT TAKE FOR MY BLOOD TO BECOME NORMAL?   It depends on the degree of anemia at the beginning of treatment. Most people with mild to moderate iron deficiency, anemia will correct the anemia over a period of 2 to 3 months. But after the anemia is corrected, the iron stored by the body is still low. Caregivers often suggest an additional 6 months of oral iron therapy once the anemia has been reversed. This will help prevent the iron deficiency anemia from quickly happening again. Non-anemic adult males should take iron supplements only under the direction of a doctor, too much iron can cause liver damage.   MY HEMOGLOBIN IS 9 G/DL AND I AM SCHEDULED FOR SURGERY. SHOULD I POSTPONE THE SURGERY?   If you have Hgb of 9, you should discuss this with your caregiver right away. Many patients with similar hemoglobin levels have had surgery without problems. If minimal blood loss is expected for a minor procedure, no treatment may be necessary.   If a greater blood loss is expected for more extensive procedures, you should ask your caregiver about being treated with erythropoietin and iron. This is to accelerate the recovery of your hemoglobin to a normal level before surgery. An anemic patient who undergoes high-blood-loss surgery has a greater risk of surgical complications and need for a blood transfusion, which also carries some risk.   I HAVE BEEN TOLD THAT HEAVY MENSTRUAL PERIODS CAUSE ANEMIA. IS THERE ANYTHING  I CAN DO TO PREVENT THE ANEMIA?   Anemia that results from heavy periods is usually due to iron deficiency. You can try to meet the increased demands for iron caused by the heavy monthly blood loss by increasing the intake of iron-rich foods. Iron supplements may be required. Discuss your concerns with your caregiver.  WHAT CAUSES ANEMIA DURING PREGNANCY?   Pregnancy places major demands on the body. The mother must meet the needs of both her body and her growing baby. The body needs enough iron and folate to make the right amount of red blood cells. To prevent anemia while pregnant, the mother should stay in close contact with her caregiver.   Be sure to eat a diet that has foods rich in iron and folate like liver and dark green leafy vegetables. Folate plays an important role in the normal development of a baby's spinal cord. Folate can help prevent serious disorders like spina bifida. If your diet does not provide adequate nutrients, you may want to talk with your caregiver about nutritional supplements.   WHAT IS THE RELATIONSHIP BETWEEN FIBROID TUMORS AND ANEMIA IN WOMEN?   The relationship is usually caused by the increased menstrual blood loss caused by fibroids. Good iron intake may be required to prevent iron deficiency anemia from developing.   Document Released: 07/26/2005 Document Revised: 03/11/2013 Document Reviewed: 01/10/2012  [employer]® Patient Information ©2013 [employer].

## 2017-09-18 NOTE — PROGRESS NOTES
Lab called with critical result of 6.9 CALCIUM at 0620. Critical lab result read back to LAB.  LORE JACKSON notified of critical lab result at 0629  Critical lab result read back by LORE JACKSON. NO NEW ORDERS GIVEN

## 2017-09-18 NOTE — PROGRESS NOTES
PT RESTING IN BED COMFORTABLY. UNEVENTFUL NIGHT.     MONITOR SUMMARY:     SR 99 TO     .06/.08/.36

## 2017-09-18 NOTE — PROGRESS NOTES
Magnesium IV infusion completed at 1402. Discharge instructions given and explained to pt. No further questions at this time. IVs taken out. Telemetry monitor taken off. All belongings with pt. Pt waiting for ride from family.

## 2017-09-19 ENCOUNTER — PATIENT OUTREACH (OUTPATIENT)
Dept: HEALTH INFORMATION MANAGEMENT | Facility: OTHER | Age: 52
End: 2017-09-19

## 2017-09-19 NOTE — DISCHARGE SUMMARY
Hospital Medicine Discharge Note     Admit Date:  9/15/2017       Discharge Date:   9/18/2017    Primary Care Provider:    Jasmyne Soto M.D.    Attending Physician:  Noemy Mast    Discharge Diagnoses:   Principal Problem (Resolved):    Anemia requiring transfusions POA: Yes  Active Problems:       Metabolic acidosis POA: Yes      Alcoholic hepatitis POA: Yes      Hypocalcemia POA: Yes    Hypomagnesemia POA: Yes    Other social stressor (bereavementj) POA: Yes  Resolved Problems:    RENEE (acute kidney injury) (CMS-HCC) POA: Yes    Hyponatremia POA: Yes    Hypokalemia POA: Yes    UTI (urinary tract infection) POA: Yes      Chronic Medical Problems:   Alcohol abuse (Chronic) POA: Yes   SLE (systemic lupus erythematosus) (CMS-HCC) (Chronic) POA: Yes   HTN (hypertension) (Chronic) POA: Yes       Consultants:      GI Consultants    Studies:  Hb 6.4-->7.3  Mag 0.7 (replaced)  AST/ALT 74/14  K 2.5-->4.9  Cr 1.6-->0.7    Imaging/ Testing:      US-ABDOMEN COMPLETE SURVEY   Final Result      1.  Increased hepatic echotexture may be secondary to fatty infiltration and/or hepatocellular disease.      2.  No hepatic mass is identified.      3.  Gallbladder sludge without significant wall thickening or pericholecystic fluid to suggest cholecystitis.      4.  Splenomegaly, possibly related to portal hypertension.         DX-CHEST-LIMITED (1 VIEW)   Final Result      No acute cardiopulmonary disease.          Procedures:        Endoscopy:  Endoscopic Findings:  - Esophagus: reflux esophagitis, exudative, LA grade B, appearance not suggestive of opportunistic infections, no esophageal ulcers nor Chelsey Roldan tear.  - Stomach: endoscopically unremarkable, no evidence of portal hypertensive gastropathy nor fundic varices.  - Duodenum: scalloping and blunted mucosa, duodenal biopsies obtained to evaluate for sprue.     Impression: Reflux esophagitis       Hospital Summary (Brief Narrative):       For H and P on admission, please  "refer to full H and P dictated by Dr. Beltrán on 9/15/17 for full details.  Briefly, this is a 51 y.o. female who presented on 9/15/2017 with Abdominal pain, nausea, vomiting, and dark stools. The patient was recently admitted and discharged from our hospital status post lower GI bleed. She had undergone a colonoscopy at that time and was diagnosed with AVMs, now status post ablation. For the last week, she had had some nausea and vomiting. The patient does carry a history of alcohol abuse and has been trying to abstain but she states that she began to drink alcohol yesterday because her  had unexpectedly   Patient has been feeling weak, shortness of breath, aches and pains all over,\" sick\". She reported that she had had multiple dark stools. She did get 3 units total of blood.    She was seen by GI, who scoped her and saw just reflux esophagitis, fo rwhich she was started on sucralfate for .  She is to continue her home omeprazole, and take sucralfate for another few days.    Her sister drove from [place] to get her and plans on taking her back to her home in [place].  The patient will need to establish with a PCP and GI physician down there next week once she gets there.      Disposition:   Discharge home with sister to [place]    Condition:  Stable    Activity:   As tolerated     Diet:   EtOH abstinence; maintain good iron rich diet    Discharge Medications:        Medication Information                      allopurinol (ZYLOPRIM) 300 MG Tab  Take 1 Tab by mouth every day.             calcium carbonate (TUMS) 500 MG Chew Tab  Take 1 Tab by mouth 3 times a day, with meals for 5 days.   new          ergocalciferol (DRISDOL) 35771 UNIT capsule  Take 1 cap once weekly for 12 weeks.             folic acid (FOLVITE) 1 MG Tab  Take 1 mg by mouth every day.             hydroxychloroquine (PLAQUENIL) 200 MG Tab  Take 1 Tab by mouth 2 times a day.             lisinopril (PRINIVIL) 20 MG Tab  Take 20 mg by mouth " every day.             omeprazole (PRILOSEC) 20 MG delayed-release capsule  Take 1 Cap by mouth every day.             phosphorus (K-PHOS-NEUTRAL, PHOSPHA 250 NEUTRAL) 155-852-130 MG tablet  Take 2 Tabs by mouth every 6 hours for 2 days.   new          predniSONE (DELTASONE) 5 MG Tab  Take 15 mg by mouth every day.             sucralfate (CARAFATE) 1 GM/10ML Suspension  Take 10 mL by mouth 4 Times a Day,Before Meals and at Bedtime for 12 doses.   new               Follow up appointment details :      Establish wit new PCP and GI in [facility] next week    Instructions:      Return to ER if any bleeding, dark stools, weakness, hematemesis      Time Spent on Discharge: 38 minutes

## 2017-09-19 NOTE — PROGRESS NOTES
Placed discharge outreach phone call to patient s/p hospital discharge 9/18/17.  Left voicemail providing my contact information and instructions to call with any questions or concerns.

## 2017-09-23 ASSESSMENT — ENCOUNTER SYMPTOMS
CONSTIPATION: 0
HEADACHES: 0
NECK PAIN: 1
BLOOD IN STOOL: 0
EYE PAIN: 0
CHILLS: 0
COUGH: 0
PALPITATIONS: 0
FEVER: 0
DOUBLE VISION: 0
HEMOPTYSIS: 0
BACK PAIN: 1
PHOTOPHOBIA: 0

## 2017-09-27 ENCOUNTER — APPOINTMENT (OUTPATIENT)
Dept: RHEUMATOLOGY | Facility: PHYSICIAN GROUP | Age: 52
End: 2017-09-27
Payer: MEDICAID

## 2018-06-11 NOTE — PROGRESS NOTES
Pt arrived to floor with 2 RICU RNs. Handoff report received. Pt used commode. Gait is steady. Pt not in distress. AOx4. Safety precautions in place. Call light within reach. Educated to call for assistance.    Eye Protection Verbiage: Before proceeding with the stage, a plastic scleral shield was inserted. The globe was anesthetized with a few drops of 1% lidocaine with 1:100,000 epinephrine. Then, an appropriate sized scleral shield was chosen and coated with lacrilube ointment. The shield was gently inserted and left in place for the duration of each stage. After the stage was completed, the shield was gently removed.

## 2019-04-18 ENCOUNTER — HOSPITAL ENCOUNTER (OUTPATIENT)
Dept: RADIOLOGY | Facility: MEDICAL CENTER | Age: 54
End: 2019-04-18

## 2019-10-05 NOTE — PROGRESS NOTES
notified of pt need for tele transfer not medical floor due to K scale monitoring. Order placed for tele.   Single episode of vaginal spotting during hospital stay on 9/25/2019.  Status post recent hysterectomy in May 2019.  Patient is established patient with OB.

## 2022-07-13 NOTE — ED PROVIDER NOTES
ED Provider Note    CHIEF COMPLAINT  Chief Complaint   Patient presents with   • Abnormal Labs   • Weakness   • Dizziness       HPI  Brandi Leggett is a 51 y.o. female who presents for evaluation of generalized weakness and malaise not feeling well. The patient has a competent history as listed below. She presented to an urgent care yesterday with the symptoms. They sent her for some laboratory studies which finally came back today which demonstrated acute renal failure with hypokalemia. She denies any new medications. She reports diminished appetite no flank pain no high fevers or chills. The patient has had decreased urine output no reported headache or chest pain 3 to note the patient is immunosuppressed on both steroids as well as bimonthly injectable and methotrexate     REVIEW OF SYSTEMS  See HPI for further details. Positive for generalized weakness fatigue no high fevers All other systems are negative.     PAST MEDICAL HISTORY  Past Medical History   Diagnosis Date   • Lupus (CMS-HCC)    • Fibroids    • SLE (systemic lupus erythematosus) (CMS-HCC)    • Psoriatic arthritis (CMS-HCC)    • Pancreatitis    • Arthritis        FAMILY HISTORY  No history of bleeding disorder    SOCIAL HISTORY  Social History     Social History   • Marital Status:      Spouse Name: N/A   • Number of Children: N/A   • Years of Education: N/A     Social History Main Topics   • Smoking status: Never Smoker    • Smokeless tobacco: Never Used   • Alcohol Use: No   • Drug Use: No   • Sexual Activity: Not Asked     Other Topics Concern   • None     Social History Narrative     Nonsmoker no IV drugs  SURGICAL HISTORY  Past Surgical History   Procedure Laterality Date   • Gyn surgery  8/2013     Hysteroscopy, D and C   • Colonoscopy N/A 5/31/2017     Procedure: COLONOSCOPY;  Surgeon: Enrique Canseco M.D.;  Location: SURGERY [facility];  Service:    • Gastroscopy with biopsy N/A 5/31/2017     Procedure: GASTROSCOPY WITH  "BIOPSY;  Surgeon: Enrique Canseco M.D.;  Location: SURGERY [facility];  Service:        CURRENT MEDICATIONS    Current facility-administered medications:   •  magnesium sulfate ivpb premix 1 g, 1 g, Intravenous, Once, Abdon Smith M.D.  •  cefTRIAXone (ROCEPHIN) injection 2 g, 2 g, Intravenous, Once, Abdon Smith M.D., 2 g at 08/04/17 1437    Current outpatient prescriptions:   •  colchicine (COLCRYS) 0.6 MG Tab, For gout flare take 1.2mg x 1 followed by 0.6 mg 1 hour later., Disp: 6 Tab, Rfl: 0  •  Adalimumab 40 MG/0.8ML Pen-injector Kit, Inject 0.8 mL as instructed every 14 days., Disp: 2 Each, Rfl: 2  •  predniSONE (DELTASONE) 5 MG Tab, alternate 10 mg and 15 mg q other day., Disp: 75 Tab, Rfl: 1  •  hydroxychloroquine (PLAQUENIL) 200 MG Tab, Take 1 Tab by mouth 2 times a day., Disp: 60 Tab, Rfl: 2  •  omeprazole (PRILOSEC) 20 MG delayed-release capsule, Take 1 Cap by mouth every day., Disp: 30 Cap, Rfl: 3  •  sucralfate (CARAFATE) 1 GM/10ML Suspension, Take 10 mL by mouth every 6 hours., Disp: 30 mL, Rfl: 0  •  lisinopril (PRINIVIL) 20 MG Tab, Take 20 mg by mouth every day., Disp: , Rfl:   •  Methotrexate Sodium (METHOTREXATE PO), Take 1 Tab by mouth every day., Disp: , Rfl:   •  allopurinol (ZYLOPRIM) 300 MG Tab, Take 1 Tab by mouth every day., Disp: 30 Tab, Rfl: 0  •  folic acid (FOLVITE) 1 MG Tab, Take 2 tabs daily., Disp: 60 Tab, Rfl: 11  •  ergocalciferol (DRISDOL) 11198 UNIT capsule, Take 1 cap once weekly for 12 weeks., Disp: 12 Cap, Rfl: 0  •  gemfibrozil (LOPID) 600 MG Tab, Take 600 mg by mouth 2 times a day., Disp: , Rfl:   •  oxybutynin (DITROPAN) 5 MG TABS, Take 5 mg by mouth 2 Times a Day., Disp: , Rfl:       ALLERGIES  Allergies   Allergen Reactions   • Codeine        PHYSICAL EXAM  VITAL SIGNS: BP 91/58 mmHg  Pulse 98  Temp(Src) 36.1 °C (97 °F)  Resp 18  Ht 1.6 m (5' 2.99\")  Wt 72.576 kg (160 lb)  BMI 28.35 kg/m2  SpO2 100% Room air O2: 100    Constitutional: Patient appears " ill.   HENT: Normocephalic, Atraumatic, Bilateral external ears normal, dry mucous membranes, No oral exudates, Nose normal.   Eyes: PERRLA, EOMI, Conjunctiva normal, No discharge.   Neck: Normal range of motion, No tenderness, Supple, No stridor.   Cardiovascular: Normal heart rate, Normal rhythm, No murmurs, No rubs, No gallops.   Thorax & Lungs: Normal breath sounds, No respiratory distress, No wheezing, No chest tenderness.   Abdomen: Bowel sounds normal, Soft, No tenderness, No masses, No pulsatile masses.   Skin: Warm, Dry, No erythema, No rash.   Back: No tenderness, No CVA tenderness.   Extremities: Intact distal pulses, No edema, No tenderness, No cyanosis, No clubbing.   Neurologic: Alert & oriented x 3, Normal motor function, Normal sensory function, No focal deficits noted.   Psychiatric: Affect normal, Judgment normal, Mood normal.     EKG  EKG Interpretation    Interpreted by me    Rhythm: normal sinus   Rate: normal  Axis: normal  Ectopy: none  Conduction: normal  ST Segments: no acute change  T Waves: no acute change  Q Waves: none    Clinical Impression: no acute changes and normal EKG  RADIOLOGY/PROCEDURES  DX-CHEST-PORTABLE (1 VIEW)   Final Result      No acute cardiopulmonary disease.        Results for orders placed or performed during the hospital encounter of 08/04/17   MAGNESIUM   Result Value Ref Range    Magnesium 1.3 (L) 1.5 - 2.5 mg/dL   CBC WITH DIFFERENTIAL   Result Value Ref Range    WBC 4.7 (L) 4.8 - 10.8 K/uL    RBC 2.53 (L) 4.20 - 5.40 M/uL    Hemoglobin 7.6 (L) 12.0 - 16.0 g/dL    Hematocrit 23.5 (L) 37.0 - 47.0 %    MCV 92.9 81.4 - 97.8 fL    MCH 30.0 27.0 - 33.0 pg    MCHC 32.3 (L) 33.6 - 35.0 g/dL    RDW 61.0 (H) 35.9 - 50.0 fL    Platelet Count 244 164 - 446 K/uL    MPV 9.8 9.0 - 12.9 fL    Nucleated RBC 0.40 /100 WBC    NRBC (Absolute) 0.02 K/uL    Neutrophils-Polys 63.50 44.00 - 72.00 %    Lymphocytes 22.10 22.00 - 41.00 %    Monocytes 9.60 0.00 - 13.40 %    Eosinophils 0.00  0.00 - 6.90 %    Basophils 0.90 0.00 - 1.80 %    Neutrophils (Absolute) 3.03 2.00 - 7.15 K/uL    Lymphs (Absolute) 1.04 1.00 - 4.80 K/uL    Monos (Absolute) 0.45 0.00 - 0.85 K/uL    Eos (Absolute) 0.00 0.00 - 0.51 K/uL    Baso (Absolute) 0.04 0.00 - 0.12 K/uL    Anisocytosis 1+     Macrocytosis 1+     Microcytosis 1+    COMP METABOLIC PANEL   Result Value Ref Range    Sodium 129 (L) 135 - 145 mmol/L    Potassium 2.7 (LL) 3.6 - 5.5 mmol/L    Chloride 94 (L) 96 - 112 mmol/L    Co2 16 (L) 20 - 33 mmol/L    Anion Gap 19.0 (H) 0.0 - 11.9    Glucose 119 (H) 65 - 99 mg/dL    Bun 28 (H) 8 - 22 mg/dL    Creatinine 3.14 (H) 0.50 - 1.40 mg/dL    Calcium 8.3 (L) 8.5 - 10.5 mg/dL    AST(SGOT) 72 (H) 12 - 45 U/L    ALT(SGPT) 22 2 - 50 U/L    Alkaline Phosphatase 228 (H) 30 - 99 U/L    Total Bilirubin 1.6 (H) 0.1 - 1.5 mg/dL    Albumin 3.2 3.2 - 4.9 g/dL    Total Protein 5.9 (L) 6.0 - 8.2 g/dL    Globulin 2.7 1.9 - 3.5 g/dL    A-G Ratio 1.2 g/dL   LIPASE   Result Value Ref Range    Lipase 54 11 - 82 U/L   URINALYSIS,CULTURE IF INDICATED   Result Value Ref Range    Micro Urine Req Microscopic     Color Yellow     Character Sl Cloudy (A)     Specific Gravity 1.005 <1.035    Ph 5.0 5.0-8.0    Glucose Trace (A) Negative mg/dL    Ketones Negative Negative mg/dL    Protein 30 (A) Negative mg/dL    Bilirubin Negative Negative    Urobilinogen, Urine Normal Negative    Nitrite Negative Negative    Leukocyte Esterase Negative Negative    Occult Blood Negative Negative    Culture Indicated No UA Culture   CORTISOL   Result Value Ref Range    Cortisol 11.8 0.0 - 23.0 ug/dL   LACTIC ACID   Result Value Ref Range    Lactic Acid 3.5 (H) 0.5 - 2.0 mmol/L   WESTERGREN SED RATE   Result Value Ref Range    Sed Rate Westergren 39 (H) 0 - 20 mm/hour   ESTIMATED GFR   Result Value Ref Range    GFR If  19 (A) >60 mL/min/1.73 m 2    GFR If Non  16 (A) >60 mL/min/1.73 m 2   URINE MICROSCOPIC (W/UA)   Result Value Ref Range     WBC 0-2 /hpf    RBC 0-2 /hpf    Bacteria Moderate (A) None /hpf    Epithelial Cells Moderate (A) /hpf    Trans Epithelial Cells Few /hpf   MORPHOLOGY   Result Value Ref Range    RBC Morphology Present     Large Platelets 1+     Smudge Cells Few     Toxic Gran Slight    PERIPHERAL SMEAR REVIEW   Result Value Ref Range    Peripheral Smear Review see below    DIFFERENTIAL MANUAL   Result Value Ref Range    Bands-Stabs 1.00 0.00 - 10.00 %    Metamyelocytes 1.90 %    Myelocytes 1.00 %    Manual Diff Status PERFORMED    PLATELET ESTIMATE   Result Value Ref Range    Plt Estimation Normal    EKG (NOW)   Result Value Ref Range    Report       [facility] Emergency Dept.    Test Date:  2017  Pt Name:    WILLIAM ROWE               Department: ER  MRN:        [id number]                      Room:        14  Gender:     F                            Technician: [id number]  :        [id number]                   Requested By:ER TRIAGE PROTOCOL  Order #:    [id number]                    Reading MD:    Measurements  Intervals                                Axis  Rate:       87                           P:          0  TN:         163                          QRS:        13  QRSD:       86                           T:          31  QT:         372  QTc:        448    Interpretive Statements  SINUS RHYTHM  CONSIDER RIGHT ATRIAL ABNORMALITY  Compared to ECG 2017 08:38:00  Sinus tachycardia no longer present            COURSE & MEDICAL DECISION MAKING  Pertinent Labs & Imaging studies reviewed. (See chart for details) patient arrived her critically ill. Septic protocol was initiated. I ordered a 30 mL/kg fluid bolus as well as antibiotics. Here she has several findings including acute renal failure extensive left leg abnormalities including acute hypokalemia hypomagnesemia. She has lactic acidosis as well. Urinalysis does not clearly show any source of infection she does not have any abdominal pain. Chest  x-ray is clear. She remained persistently hypotensive after 2 L of fluid. At that point I gave her a stress dose steroids with 100 mg of hydrocortisone. I consulted her rheumatologist Dr. Mcknight she is aware of the patient. She would like to judiciously use steroids in this setting but if we need to increase hydrocortisone to maintain pressures that is reasonable. After aggressive fluid resuscitation repeat lactic acid was drawn and was trending downwards and blood pressures have stabilized. Patient remains critically ill. We administered by IV and oral potassium as well as IV magnesium.    CRITICAL CARE TIME:    The patient required approximately 35 minutes worth of critical care time. This excludes any procedures. This includes time spent directly at caring for the patient, making critical medical decisions, involving consultants and speaking with the family.  FINAL IMPRESSION  1. Lactic acidosis  2. Acute renal insufficiency  3. Severe hypokalemia  4. Hypomagnesemia  5. Compensated sepsis      Electronically signed by: Abdon Smith, 8/4/2017 12:08 PM     Render Post-Care Instructions In Note?: no Detail Level: Simple Consent: The patient's consent was obtained including but not limited to risks of crusting, scabbing, blistering, scarring, darker or lighter pigmentary change, recurrence, incomplete removal and infection. Total Number Of Aks Treated: 7 Duration Of Freeze Thaw-Cycle (Seconds): 0 Post-Care Instructions: I reviewed with the patient in detail post-care instructions. Patient is to wear sunprotection, and avoid picking at any of the treated lesions. Pt may apply Vaseline to crusted or scabbing areas.

## 2023-01-19 NOTE — TELEPHONE ENCOUNTER
Called pt. And let her know that she would have to call [phone] and get the information about internal medicine, and which provider she would be able to see based on her insurance. Thank you!   23

## (undated) DEVICE — TUBE NG SALEM SUMP 14FR (50EA/BX)

## (undated) DEVICE — NEPTUNE 4 PORT MANIFOLD - (20/PK)

## (undated) DEVICE — TUBE CONNECTING SUCTION - CLEAR PLASTIC STERILE 72 IN (50EA/CA)

## (undated) DEVICE — BASIN EMESIS DISP. - (250/CA)

## (undated) DEVICE — PROBE ERBE FIAPC 2.3MM (10EA/BX)

## (undated) DEVICE — SPONGE GAUZE NON-STERILE 4X4 - (2000/CA 10PK/CA)

## (undated) DEVICE — CON SEDATION/>5 YR 1ST 15 MIN

## (undated) DEVICE — SYRINGE 6 CC 20 GA X 1 1/2 - NDL SAFETY  (50/BX)

## (undated) DEVICE — FORCEP RADIAL JAW 4 STANDARD CAPACITY W/NEEDLE 240CM (40EA/BX)

## (undated) DEVICE — CANISTER SUCTION RIGID RED 1500CC (40EA/CA)

## (undated) DEVICE — KIT CUSTOM PROCEDURE SINGLE FOR ENDO  (15/CA)

## (undated) DEVICE — MANIFOLD NEPTUNE 1 PORT (20/PK)

## (undated) DEVICE — GOWN SURGEONS X-LARGE - DISP. (30/CA)

## (undated) DEVICE — CATHETER IV 20 GA X 1-1/4 ---SURG.& SDS ONLY--- (50EA/BX)

## (undated) DEVICE — CUFF BP ADULT MEDIUM DISPOSABLE (20EA/CA)

## (undated) DEVICE — MASK WITH FACE SHIELD (25/BX 4BX/CA)

## (undated) DEVICE — CONTAINER, SPECIMEN, STERILE

## (undated) DEVICE — SOD. CHL 10CC SYRINGE PREFILL - W/10 CC (30/BX)

## (undated) DEVICE — FILM CASSETTE ENDO

## (undated) DEVICE — SYRINGE 3 CC 22 GA X 1-1/2 - NDL SAFETY (50/BX 8BX/CA)

## (undated) DEVICE — BITEBLOCK ENDOSCOPIC PEDI. - (25/BX)

## (undated) DEVICE — ELECTRODE 850 FOAM ADHESIVE - HYDROGEL RADIOTRNSPRNT (50/PK)

## (undated) DEVICE — BITE BLOCK ADULT 60FR (100EA/CA)

## (undated) DEVICE — SYRINGE DISP. 50CC LS - (40/BX)

## (undated) DEVICE — TUBING O2 7FT TIP SMTH BORE - (50/CA)